# Patient Record
Sex: FEMALE | Race: WHITE | NOT HISPANIC OR LATINO | Employment: OTHER | URBAN - METROPOLITAN AREA
[De-identification: names, ages, dates, MRNs, and addresses within clinical notes are randomized per-mention and may not be internally consistent; named-entity substitution may affect disease eponyms.]

---

## 2017-05-01 ENCOUNTER — RECORDS - HEALTHEAST (OUTPATIENT)
Dept: LAB | Facility: CLINIC | Age: 52
End: 2017-05-01

## 2017-05-02 LAB
CHOLEST SERPL-MCNC: 179 MG/DL
FASTING STATUS PATIENT QL REPORTED: NORMAL
HDLC SERPL-MCNC: 58 MG/DL
LDLC SERPL CALC-MCNC: 106 MG/DL
TRIGL SERPL-MCNC: 73 MG/DL

## 2018-02-23 ENCOUNTER — RECORDS - HEALTHEAST (OUTPATIENT)
Dept: LAB | Facility: CLINIC | Age: 53
End: 2018-02-23

## 2018-02-23 LAB
CHOLEST SERPL-MCNC: 172 MG/DL
FASTING STATUS PATIENT QL REPORTED: NO
HDLC SERPL-MCNC: 55 MG/DL
LDLC SERPL CALC-MCNC: 78 MG/DL
TRIGL SERPL-MCNC: 193 MG/DL

## 2018-07-06 ENCOUNTER — RECORDS - HEALTHEAST (OUTPATIENT)
Dept: LAB | Facility: CLINIC | Age: 53
End: 2018-07-06

## 2018-07-06 LAB
ANION GAP SERPL CALCULATED.3IONS-SCNC: 10 MMOL/L (ref 5–18)
BUN SERPL-MCNC: 16 MG/DL (ref 8–22)
CALCIUM SERPL-MCNC: 9.6 MG/DL (ref 8.5–10.5)
CHLORIDE BLD-SCNC: 106 MMOL/L (ref 98–107)
CO2 SERPL-SCNC: 23 MMOL/L (ref 22–31)
CREAT SERPL-MCNC: 0.7 MG/DL (ref 0.6–1.1)
ERYTHROCYTE [DISTWIDTH] IN BLOOD BY AUTOMATED COUNT: 12.2 % (ref 11–14.5)
GFR SERPL CREATININE-BSD FRML MDRD: >60 ML/MIN/1.73M2
GLUCOSE BLD-MCNC: 96 MG/DL (ref 70–125)
HCT VFR BLD AUTO: 37.8 % (ref 35–47)
HGB BLD-MCNC: 12.8 G/DL (ref 12–16)
INR PPP: 0.95 (ref 0.9–1.1)
MCH RBC QN AUTO: 30.1 PG (ref 27–34)
MCHC RBC AUTO-ENTMCNC: 33.9 G/DL (ref 32–36)
MCV RBC AUTO: 89 FL (ref 80–100)
PLATELET # BLD AUTO: 245 THOU/UL (ref 140–440)
PMV BLD AUTO: 9.8 FL (ref 8.5–12.5)
POTASSIUM BLD-SCNC: 4.4 MMOL/L (ref 3.5–5)
RBC # BLD AUTO: 4.25 MILL/UL (ref 3.8–5.4)
SODIUM SERPL-SCNC: 139 MMOL/L (ref 136–145)
WBC: 6.6 THOU/UL (ref 4–11)

## 2018-08-24 ENCOUNTER — RECORDS - HEALTHEAST (OUTPATIENT)
Dept: LAB | Facility: CLINIC | Age: 53
End: 2018-08-24

## 2018-08-24 LAB
CHOLEST SERPL-MCNC: 228 MG/DL
FASTING STATUS PATIENT QL REPORTED: ABNORMAL
HDLC SERPL-MCNC: 69 MG/DL
LDLC SERPL CALC-MCNC: 132 MG/DL
TRIGL SERPL-MCNC: 133 MG/DL

## 2018-08-27 LAB
HPV SOURCE: NORMAL
HUMAN PAPILLOMA VIRUS 16 DNA: NEGATIVE
HUMAN PAPILLOMA VIRUS 18 DNA: NEGATIVE
HUMAN PAPILLOMA VIRUS FINAL DIAGNOSIS: NORMAL
HUMAN PAPILLOMA VIRUS OTHER HR: NEGATIVE
SPECIMEN DESCRIPTION: NORMAL

## 2018-08-29 LAB
BKR LAB AP ABNORMAL BLEEDING: NO
BKR LAB AP BIRTH CONTROL/HORMONES: NORMAL
BKR LAB AP CERVICAL APPEARANCE: NORMAL
BKR LAB AP GYN ADEQUACY: NORMAL
BKR LAB AP GYN INTERPRETATION: NORMAL
BKR LAB AP HPV REFLEX: NORMAL
BKR LAB AP LMP: NORMAL
BKR LAB AP PATIENT STATUS: NORMAL
BKR LAB AP PREVIOUS ABNORMAL: NORMAL
BKR LAB AP PREVIOUS NORMAL: 2014
HIGH RISK?: NO
PATH REPORT.COMMENTS IMP SPEC: NORMAL
RESULT FLAG (HE HISTORICAL CONVERSION): NORMAL

## 2018-09-28 ENCOUNTER — HOSPITAL ENCOUNTER (OUTPATIENT)
Dept: MAMMOGRAPHY | Facility: CLINIC | Age: 53
Discharge: HOME | End: 2018-09-28
Attending: FAMILY MEDICINE

## 2018-09-28 DIAGNOSIS — Z12.31 VISIT FOR SCREENING MAMMOGRAM: ICD-10-CM

## 2019-08-09 ENCOUNTER — RECORDS - HEALTHEAST (OUTPATIENT)
Dept: LAB | Facility: CLINIC | Age: 54
End: 2019-08-09

## 2019-08-09 LAB
ANION GAP SERPL CALCULATED.3IONS-SCNC: 10 MMOL/L (ref 5–18)
BUN SERPL-MCNC: 17 MG/DL (ref 8–22)
CALCIUM SERPL-MCNC: 9.8 MG/DL (ref 8.5–10.5)
CHLORIDE BLD-SCNC: 103 MMOL/L (ref 98–107)
CO2 SERPL-SCNC: 27 MMOL/L (ref 22–31)
CREAT SERPL-MCNC: 0.83 MG/DL (ref 0.6–1.1)
ERYTHROCYTE [DISTWIDTH] IN BLOOD BY AUTOMATED COUNT: 11.9 % (ref 11–14.5)
GFR SERPL CREATININE-BSD FRML MDRD: >60 ML/MIN/1.73M2
GLUCOSE BLD-MCNC: 85 MG/DL (ref 70–125)
HCT VFR BLD AUTO: 38 % (ref 35–47)
HGB BLD-MCNC: 12.5 G/DL (ref 12–16)
MCH RBC QN AUTO: 29.8 PG (ref 27–34)
MCHC RBC AUTO-ENTMCNC: 32.9 G/DL (ref 32–36)
MCV RBC AUTO: 91 FL (ref 80–100)
PLATELET # BLD AUTO: 210 THOU/UL (ref 140–440)
PMV BLD AUTO: 9.4 FL (ref 8.5–12.5)
POTASSIUM BLD-SCNC: 3.9 MMOL/L (ref 3.5–5)
RBC # BLD AUTO: 4.19 MILL/UL (ref 3.8–5.4)
SODIUM SERPL-SCNC: 140 MMOL/L (ref 136–145)
WBC: 6.1 THOU/UL (ref 4–11)

## 2020-02-06 ENCOUNTER — HOSPITAL ENCOUNTER (OUTPATIENT)
Dept: MAMMOGRAPHY | Facility: CLINIC | Age: 55
Discharge: HOME | End: 2020-02-06
Attending: FAMILY MEDICINE

## 2020-02-06 ENCOUNTER — COMMUNICATION - HEALTHEAST (OUTPATIENT)
Dept: TELEHEALTH | Facility: CLINIC | Age: 55
End: 2020-02-06

## 2020-02-06 ENCOUNTER — RECORDS - HEALTHEAST (OUTPATIENT)
Dept: ADMINISTRATIVE | Facility: OTHER | Age: 55
End: 2020-02-06

## 2020-02-06 ENCOUNTER — HOSPITAL ENCOUNTER (OUTPATIENT)
Dept: ULTRASOUND IMAGING | Facility: CLINIC | Age: 55
Discharge: HOME | End: 2020-02-06
Attending: FAMILY MEDICINE

## 2020-02-06 DIAGNOSIS — N64.59 INVERSION, NIPPLE: ICD-10-CM

## 2020-02-06 DIAGNOSIS — N63.10 BREAST MASS, RIGHT: ICD-10-CM

## 2020-02-06 DIAGNOSIS — Z12.31 VISIT FOR SCREENING MAMMOGRAM: ICD-10-CM

## 2020-02-07 ENCOUNTER — RECORDS - HEALTHEAST (OUTPATIENT)
Dept: ADMINISTRATIVE | Facility: OTHER | Age: 55
End: 2020-02-07

## 2020-02-10 ENCOUNTER — HOSPITAL ENCOUNTER (OUTPATIENT)
Dept: MAMMOGRAPHY | Facility: CLINIC | Age: 55
Discharge: HOME | End: 2020-02-10
Attending: FAMILY MEDICINE

## 2020-02-10 ENCOUNTER — HOSPITAL ENCOUNTER (OUTPATIENT)
Dept: ULTRASOUND IMAGING | Facility: CLINIC | Age: 55
Discharge: HOME | End: 2020-02-10
Attending: FAMILY MEDICINE | Admitting: RADIOLOGY

## 2020-02-10 DIAGNOSIS — N63.10 BREAST MASS, RIGHT: ICD-10-CM

## 2020-02-10 DIAGNOSIS — N64.59 INVERSION, NIPPLE: ICD-10-CM

## 2020-02-13 ENCOUNTER — COMMUNICATION - HEALTHEAST (OUTPATIENT)
Dept: ONCOLOGY | Facility: CLINIC | Age: 55
End: 2020-02-13

## 2020-02-13 LAB
LAB AP CHARGES (HE HISTORICAL CONVERSION): NORMAL
PATH REPORT.COMMENTS IMP SPEC: NORMAL
PATH REPORT.COMMENTS IMP SPEC: NORMAL
PATH REPORT.FINAL DX SPEC: NORMAL
PATH REPORT.GROSS SPEC: NORMAL
PATH REPORT.MICROSCOPIC SPEC OTHER STN: NORMAL
PATH REPORT.MICROSCOPIC SPEC OTHER STN: NORMAL
PATH REPORT.RELEVANT HX SPEC: NORMAL
RESULT FLAG (HE HISTORICAL CONVERSION): NORMAL

## 2020-02-14 ENCOUNTER — RECORDS - HEALTHEAST (OUTPATIENT)
Dept: ADMINISTRATIVE | Facility: OTHER | Age: 55
End: 2020-02-14

## 2020-02-14 ENCOUNTER — AMBULATORY - HEALTHEAST (OUTPATIENT)
Dept: SURGERY | Facility: CLINIC | Age: 55
End: 2020-02-14

## 2020-02-14 DIAGNOSIS — C50.211 MALIGNANT NEOPLASM OF UPPER-INNER QUADRANT OF RIGHT FEMALE BREAST (H): ICD-10-CM

## 2020-02-14 DIAGNOSIS — C50.911 MALIGNANT NEOPLASM OF RIGHT BREAST (H): ICD-10-CM

## 2020-02-14 DIAGNOSIS — D05.11 DUCTAL CARCINOMA IN SITU OF RIGHT BREAST: ICD-10-CM

## 2020-02-17 ENCOUNTER — AMBULATORY - HEALTHEAST (OUTPATIENT)
Dept: SURGERY | Facility: CLINIC | Age: 55
End: 2020-02-17

## 2020-02-20 ENCOUNTER — HOSPITAL ENCOUNTER (OUTPATIENT)
Dept: SURGERY | Facility: CLINIC | Age: 55
Discharge: HOME | End: 2020-02-20
Attending: SPECIALIST

## 2020-02-20 DIAGNOSIS — C50.111 MALIGNANT NEOPLASM OF CENTRAL PORTION OF RIGHT BREAST IN FEMALE, ESTROGEN RECEPTOR POSITIVE (H): ICD-10-CM

## 2020-02-20 DIAGNOSIS — Z17.0 MALIGNANT NEOPLASM OF CENTRAL PORTION OF RIGHT BREAST IN FEMALE, ESTROGEN RECEPTOR POSITIVE (H): ICD-10-CM

## 2020-02-20 ASSESSMENT — MIFFLIN-ST. JEOR: SCORE: 1372.46

## 2020-03-05 ENCOUNTER — RECORDS - HEALTHEAST (OUTPATIENT)
Dept: LAB | Facility: CLINIC | Age: 55
End: 2020-03-05

## 2020-03-05 LAB
ANION GAP SERPL CALCULATED.3IONS-SCNC: 8 MMOL/L (ref 5–18)
BUN SERPL-MCNC: 17 MG/DL (ref 8–22)
CALCIUM SERPL-MCNC: 9.5 MG/DL (ref 8.5–10.5)
CHLORIDE BLD-SCNC: 105 MMOL/L (ref 98–107)
CO2 SERPL-SCNC: 27 MMOL/L (ref 22–31)
CREAT SERPL-MCNC: 0.82 MG/DL (ref 0.6–1.1)
GFR SERPL CREATININE-BSD FRML MDRD: >60 ML/MIN/1.73M2
GLUCOSE BLD-MCNC: 80 MG/DL (ref 70–125)
POTASSIUM BLD-SCNC: 3.9 MMOL/L (ref 3.5–5)
SODIUM SERPL-SCNC: 140 MMOL/L (ref 136–145)

## 2020-03-09 ENCOUNTER — COMMUNICATION - HEALTHEAST (OUTPATIENT)
Dept: SURGERY | Facility: CLINIC | Age: 55
End: 2020-03-09

## 2020-03-17 ENCOUNTER — ANESTHESIA - HEALTHEAST (OUTPATIENT)
Dept: SURGERY | Facility: HOSPITAL | Age: 55
End: 2020-03-17

## 2020-03-17 ASSESSMENT — MIFFLIN-ST. JEOR: SCORE: 1372.46

## 2020-03-18 ENCOUNTER — RECORDS - HEALTHEAST (OUTPATIENT)
Dept: ADMINISTRATIVE | Facility: OTHER | Age: 55
End: 2020-03-18

## 2020-03-18 ENCOUNTER — HOSPITAL ENCOUNTER (OUTPATIENT)
Dept: NUCLEAR MEDICINE | Facility: HOSPITAL | Age: 55
Discharge: HOME | End: 2020-03-18
Attending: SPECIALIST

## 2020-03-18 ENCOUNTER — SURGERY - HEALTHEAST (OUTPATIENT)
Dept: SURGERY | Facility: HOSPITAL | Age: 55
End: 2020-03-18

## 2020-03-18 ENCOUNTER — HOSPITAL ENCOUNTER (OUTPATIENT)
Dept: NUCLEAR MEDICINE | Facility: HOSPITAL | Age: 55
Discharge: HOME | End: 2020-03-18
Attending: SPECIALIST | Admitting: SPECIALIST

## 2020-03-18 DIAGNOSIS — C50.111 MALIGNANT NEOPLASM OF CENTRAL PORTION OF RIGHT BREAST IN FEMALE, ESTROGEN RECEPTOR POSITIVE (H): ICD-10-CM

## 2020-03-18 DIAGNOSIS — Z17.0 MALIGNANT NEOPLASM OF CENTRAL PORTION OF RIGHT BREAST IN FEMALE, ESTROGEN RECEPTOR POSITIVE (H): ICD-10-CM

## 2020-03-24 ENCOUNTER — AMBULATORY - HEALTHEAST (OUTPATIENT)
Dept: SURGERY | Facility: CLINIC | Age: 55
End: 2020-03-24

## 2020-03-24 DIAGNOSIS — C50.111 MALIGNANT NEOPLASM OF CENTRAL PORTION OF RIGHT BREAST IN FEMALE, ESTROGEN RECEPTOR POSITIVE (H): ICD-10-CM

## 2020-03-24 DIAGNOSIS — Z17.0 MALIGNANT NEOPLASM OF CENTRAL PORTION OF RIGHT BREAST IN FEMALE, ESTROGEN RECEPTOR POSITIVE (H): ICD-10-CM

## 2020-03-26 ASSESSMENT — MIFFLIN-ST. JEOR: SCORE: 1358.85

## 2020-03-29 ENCOUNTER — ANESTHESIA - HEALTHEAST (OUTPATIENT)
Dept: SURGERY | Facility: HOSPITAL | Age: 55
End: 2020-03-29

## 2020-03-30 ENCOUNTER — SURGERY - HEALTHEAST (OUTPATIENT)
Dept: SURGERY | Facility: HOSPITAL | Age: 55
End: 2020-03-30

## 2020-04-03 ENCOUNTER — AMBULATORY - HEALTHEAST (OUTPATIENT)
Dept: SURGERY | Facility: CLINIC | Age: 55
End: 2020-04-03

## 2020-04-03 DIAGNOSIS — Z17.0 MALIGNANT NEOPLASM OF CENTRAL PORTION OF RIGHT BREAST IN FEMALE, ESTROGEN RECEPTOR POSITIVE (H): ICD-10-CM

## 2020-04-03 DIAGNOSIS — C50.111 MALIGNANT NEOPLASM OF CENTRAL PORTION OF RIGHT BREAST IN FEMALE, ESTROGEN RECEPTOR POSITIVE (H): ICD-10-CM

## 2020-04-06 ENCOUNTER — COMMUNICATION - HEALTHEAST (OUTPATIENT)
Dept: ONCOLOGY | Facility: CLINIC | Age: 55
End: 2020-04-06

## 2020-04-07 ENCOUNTER — RECORDS - HEALTHEAST (OUTPATIENT)
Dept: ADMINISTRATIVE | Facility: OTHER | Age: 55
End: 2020-04-07

## 2020-04-07 ENCOUNTER — AMBULATORY - HEALTHEAST (OUTPATIENT)
Dept: ONCOLOGY | Facility: CLINIC | Age: 55
End: 2020-04-07

## 2020-04-07 ENCOUNTER — COMMUNICATION - HEALTHEAST (OUTPATIENT)
Dept: ONCOLOGY | Facility: CLINIC | Age: 55
End: 2020-04-07

## 2020-04-07 ENCOUNTER — OFFICE VISIT - HEALTHEAST (OUTPATIENT)
Dept: ONCOLOGY | Facility: CLINIC | Age: 55
End: 2020-04-07

## 2020-04-07 DIAGNOSIS — L03.313 CELLULITIS OF CHEST WALL: ICD-10-CM

## 2020-04-07 DIAGNOSIS — Z51.11 ENCOUNTER FOR ANTINEOPLASTIC CHEMOTHERAPY: ICD-10-CM

## 2020-04-07 DIAGNOSIS — C50.211 MALIGNANT NEOPLASM OF UPPER-INNER QUADRANT OF RIGHT BREAST IN FEMALE, ESTROGEN RECEPTOR POSITIVE (H): ICD-10-CM

## 2020-04-07 DIAGNOSIS — Z17.0 MALIGNANT NEOPLASM OF UPPER-INNER QUADRANT OF RIGHT BREAST IN FEMALE, ESTROGEN RECEPTOR POSITIVE (H): ICD-10-CM

## 2020-04-07 LAB
ALBUMIN SERPL-MCNC: 3.6 G/DL (ref 3.5–5)
ALP SERPL-CCNC: 74 U/L (ref 45–120)
ALT SERPL W P-5'-P-CCNC: 18 U/L (ref 0–45)
ANION GAP SERPL CALCULATED.3IONS-SCNC: 12 MMOL/L (ref 5–18)
AST SERPL W P-5'-P-CCNC: 20 U/L (ref 0–40)
BASOPHILS # BLD AUTO: 0 THOU/UL (ref 0–0.2)
BASOPHILS NFR BLD AUTO: 0 % (ref 0–2)
BILIRUB SERPL-MCNC: 0.5 MG/DL (ref 0–1)
BUN SERPL-MCNC: 13 MG/DL (ref 8–22)
CALCIUM SERPL-MCNC: 9.4 MG/DL (ref 8.5–10.5)
CHLORIDE BLD-SCNC: 103 MMOL/L (ref 98–107)
CO2 SERPL-SCNC: 21 MMOL/L (ref 22–31)
CREAT SERPL-MCNC: 0.9 MG/DL (ref 0.6–1.1)
EOSINOPHIL # BLD AUTO: 0.1 THOU/UL (ref 0–0.4)
EOSINOPHIL NFR BLD AUTO: 1 % (ref 0–6)
ERYTHROCYTE [DISTWIDTH] IN BLOOD BY AUTOMATED COUNT: 11.9 % (ref 11–14.5)
GFR SERPL CREATININE-BSD FRML MDRD: >60 ML/MIN/1.73M2
GLUCOSE BLD-MCNC: 96 MG/DL (ref 70–125)
HCT VFR BLD AUTO: 37.8 % (ref 35–47)
HGB BLD-MCNC: 12.3 G/DL (ref 12–16)
LYMPHOCYTES # BLD AUTO: 0.9 THOU/UL (ref 0.8–4.4)
LYMPHOCYTES NFR BLD AUTO: 12 % (ref 20–40)
MCH RBC QN AUTO: 29.9 PG (ref 27–34)
MCHC RBC AUTO-ENTMCNC: 32.5 G/DL (ref 32–36)
MCV RBC AUTO: 92 FL (ref 80–100)
MONOCYTES # BLD AUTO: 0.8 THOU/UL (ref 0–0.9)
MONOCYTES NFR BLD AUTO: 10 % (ref 2–10)
NEUTROPHILS # BLD AUTO: 5.9 THOU/UL (ref 2–7.7)
NEUTROPHILS NFR BLD AUTO: 76 % (ref 50–70)
PLATELET # BLD AUTO: 215 THOU/UL (ref 140–440)
PMV BLD AUTO: 9.4 FL (ref 8.5–12.5)
POTASSIUM BLD-SCNC: 3.7 MMOL/L (ref 3.5–5)
PROT SERPL-MCNC: 6.7 G/DL (ref 6–8)
RBC # BLD AUTO: 4.12 MILL/UL (ref 3.8–5.4)
SODIUM SERPL-SCNC: 136 MMOL/L (ref 136–145)
WBC: 7.7 THOU/UL (ref 4–11)

## 2020-04-07 ASSESSMENT — MIFFLIN-ST. JEOR: SCORE: 1399.67

## 2020-04-09 ENCOUNTER — COMMUNICATION - HEALTHEAST (OUTPATIENT)
Dept: ADMINISTRATIVE | Facility: HOSPITAL | Age: 55
End: 2020-04-09

## 2020-04-14 ENCOUNTER — HOSPITAL ENCOUNTER (OUTPATIENT)
Dept: NUCLEAR MEDICINE | Facility: CLINIC | Age: 55
Setting detail: RADIATION/ONCOLOGY SERIES
Discharge: STILL ADMITTED OR ADMINISTRATIVE | End: 2020-04-14
Attending: INTERNAL MEDICINE

## 2020-04-14 DIAGNOSIS — C50.211 MALIGNANT NEOPLASM OF UPPER-INNER QUADRANT OF RIGHT BREAST IN FEMALE, ESTROGEN RECEPTOR POSITIVE (H): ICD-10-CM

## 2020-04-14 DIAGNOSIS — Z17.0 MALIGNANT NEOPLASM OF UPPER-INNER QUADRANT OF RIGHT BREAST IN FEMALE, ESTROGEN RECEPTOR POSITIVE (H): ICD-10-CM

## 2020-04-14 DIAGNOSIS — Z51.11 ENCOUNTER FOR ANTINEOPLASTIC CHEMOTHERAPY: ICD-10-CM

## 2020-04-15 LAB — MUGA LV EJECTION FRACTION: 66 %

## 2020-04-28 ENCOUNTER — OFFICE VISIT - HEALTHEAST (OUTPATIENT)
Dept: ONCOLOGY | Facility: CLINIC | Age: 55
End: 2020-04-28

## 2020-04-28 ENCOUNTER — AMBULATORY - HEALTHEAST (OUTPATIENT)
Dept: ONCOLOGY | Facility: CLINIC | Age: 55
End: 2020-04-28

## 2020-04-28 DIAGNOSIS — Z17.0 MALIGNANT NEOPLASM OF UPPER-INNER QUADRANT OF RIGHT BREAST IN FEMALE, ESTROGEN RECEPTOR POSITIVE (H): ICD-10-CM

## 2020-04-28 DIAGNOSIS — C50.211 MALIGNANT NEOPLASM OF UPPER-INNER QUADRANT OF RIGHT BREAST IN FEMALE, ESTROGEN RECEPTOR POSITIVE (H): ICD-10-CM

## 2020-04-28 DIAGNOSIS — Z51.11 ENCOUNTER FOR ANTINEOPLASTIC CHEMOTHERAPY: ICD-10-CM

## 2020-04-29 ENCOUNTER — COMMUNICATION - HEALTHEAST (OUTPATIENT)
Dept: ONCOLOGY | Facility: CLINIC | Age: 55
End: 2020-04-29

## 2020-05-01 ENCOUNTER — COMMUNICATION - HEALTHEAST (OUTPATIENT)
Dept: ONCOLOGY | Facility: CLINIC | Age: 55
End: 2020-05-01

## 2020-05-07 ENCOUNTER — COMMUNICATION - HEALTHEAST (OUTPATIENT)
Dept: ONCOLOGY | Facility: CLINIC | Age: 55
End: 2020-05-07

## 2020-05-08 ENCOUNTER — INFUSION - HEALTHEAST (OUTPATIENT)
Dept: INFUSION THERAPY | Facility: CLINIC | Age: 55
End: 2020-05-08

## 2020-05-08 ENCOUNTER — AMBULATORY - HEALTHEAST (OUTPATIENT)
Dept: INFUSION THERAPY | Facility: CLINIC | Age: 55
End: 2020-05-08

## 2020-05-08 ENCOUNTER — OFFICE VISIT - HEALTHEAST (OUTPATIENT)
Dept: ONCOLOGY | Facility: CLINIC | Age: 55
End: 2020-05-08

## 2020-05-08 DIAGNOSIS — Z17.0 MALIGNANT NEOPLASM OF UPPER-INNER QUADRANT OF RIGHT BREAST IN FEMALE, ESTROGEN RECEPTOR POSITIVE (H): ICD-10-CM

## 2020-05-08 DIAGNOSIS — C50.211 MALIGNANT NEOPLASM OF UPPER-INNER QUADRANT OF RIGHT BREAST IN FEMALE, ESTROGEN RECEPTOR POSITIVE (H): ICD-10-CM

## 2020-05-08 DIAGNOSIS — Z51.11 ENCOUNTER FOR ANTINEOPLASTIC CHEMOTHERAPY: ICD-10-CM

## 2020-05-08 LAB
ALBUMIN SERPL-MCNC: 3.9 G/DL (ref 3.5–5)
ALP SERPL-CCNC: 68 U/L (ref 45–120)
ALT SERPL W P-5'-P-CCNC: 20 U/L (ref 0–45)
ANION GAP SERPL CALCULATED.3IONS-SCNC: 9 MMOL/L (ref 5–18)
AST SERPL W P-5'-P-CCNC: 18 U/L (ref 0–40)
BASOPHILS # BLD AUTO: 0 THOU/UL (ref 0–0.2)
BASOPHILS NFR BLD AUTO: 1 % (ref 0–2)
BILIRUB SERPL-MCNC: 0.4 MG/DL (ref 0–1)
BUN SERPL-MCNC: 14 MG/DL (ref 8–22)
CALCIUM SERPL-MCNC: 9.1 MG/DL (ref 8.5–10.5)
CHLORIDE BLD-SCNC: 107 MMOL/L (ref 98–107)
CO2 SERPL-SCNC: 24 MMOL/L (ref 22–31)
CREAT SERPL-MCNC: 0.79 MG/DL (ref 0.6–1.1)
EOSINOPHIL # BLD AUTO: 0.2 THOU/UL (ref 0–0.4)
EOSINOPHIL NFR BLD AUTO: 4 % (ref 0–6)
ERYTHROCYTE [DISTWIDTH] IN BLOOD BY AUTOMATED COUNT: 12.2 % (ref 11–14.5)
GFR SERPL CREATININE-BSD FRML MDRD: >60 ML/MIN/1.73M2
GLUCOSE BLD-MCNC: 98 MG/DL (ref 70–125)
HCT VFR BLD AUTO: 38.4 % (ref 35–47)
HGB BLD-MCNC: 12.4 G/DL (ref 12–16)
LYMPHOCYTES # BLD AUTO: 1.6 THOU/UL (ref 0.8–4.4)
LYMPHOCYTES NFR BLD AUTO: 36 % (ref 20–40)
MCH RBC QN AUTO: 29.5 PG (ref 27–34)
MCHC RBC AUTO-ENTMCNC: 32.3 G/DL (ref 32–36)
MCV RBC AUTO: 91 FL (ref 80–100)
MONOCYTES # BLD AUTO: 0.5 THOU/UL (ref 0–0.9)
MONOCYTES NFR BLD AUTO: 11 % (ref 2–10)
NEUTROPHILS # BLD AUTO: 2.1 THOU/UL (ref 2–7.7)
NEUTROPHILS NFR BLD AUTO: 48 % (ref 50–70)
PLATELET # BLD AUTO: 220 THOU/UL (ref 140–440)
PMV BLD AUTO: 9.2 FL (ref 8.5–12.5)
POTASSIUM BLD-SCNC: 3.8 MMOL/L (ref 3.5–5)
PROT SERPL-MCNC: 6.9 G/DL (ref 6–8)
RBC # BLD AUTO: 4.2 MILL/UL (ref 3.8–5.4)
SODIUM SERPL-SCNC: 140 MMOL/L (ref 136–145)
WBC: 4.3 THOU/UL (ref 4–11)

## 2020-05-13 ENCOUNTER — COMMUNICATION - HEALTHEAST (OUTPATIENT)
Dept: ONCOLOGY | Facility: CLINIC | Age: 55
End: 2020-05-13

## 2020-05-22 ENCOUNTER — INFUSION - HEALTHEAST (OUTPATIENT)
Dept: INFUSION THERAPY | Facility: CLINIC | Age: 55
End: 2020-05-22

## 2020-05-22 ENCOUNTER — OFFICE VISIT - HEALTHEAST (OUTPATIENT)
Dept: ONCOLOGY | Facility: CLINIC | Age: 55
End: 2020-05-22

## 2020-05-22 ENCOUNTER — AMBULATORY - HEALTHEAST (OUTPATIENT)
Dept: INFUSION THERAPY | Facility: CLINIC | Age: 55
End: 2020-05-22

## 2020-05-22 DIAGNOSIS — C50.211 MALIGNANT NEOPLASM OF UPPER-INNER QUADRANT OF RIGHT BREAST IN FEMALE, ESTROGEN RECEPTOR POSITIVE (H): ICD-10-CM

## 2020-05-22 DIAGNOSIS — Z17.0 MALIGNANT NEOPLASM OF UPPER-INNER QUADRANT OF RIGHT BREAST IN FEMALE, ESTROGEN RECEPTOR POSITIVE (H): ICD-10-CM

## 2020-05-22 DIAGNOSIS — Z51.11 ENCOUNTER FOR ANTINEOPLASTIC CHEMOTHERAPY: ICD-10-CM

## 2020-05-22 LAB
ALBUMIN SERPL-MCNC: 3.6 G/DL (ref 3.5–5)
ALP SERPL-CCNC: 59 U/L (ref 45–120)
ALT SERPL W P-5'-P-CCNC: 22 U/L (ref 0–45)
ANION GAP SERPL CALCULATED.3IONS-SCNC: 6 MMOL/L (ref 5–18)
AST SERPL W P-5'-P-CCNC: 17 U/L (ref 0–40)
BASOPHILS # BLD AUTO: 0 THOU/UL (ref 0–0.2)
BASOPHILS NFR BLD AUTO: 0 % (ref 0–2)
BILIRUB SERPL-MCNC: 0.1 MG/DL (ref 0–1)
BUN SERPL-MCNC: 11 MG/DL (ref 8–22)
CALCIUM SERPL-MCNC: 9 MG/DL (ref 8.5–10.5)
CHLORIDE BLD-SCNC: 109 MMOL/L (ref 98–107)
CO2 SERPL-SCNC: 26 MMOL/L (ref 22–31)
CREAT SERPL-MCNC: 0.74 MG/DL (ref 0.6–1.1)
EOSINOPHIL COUNT (ABSOLUTE): 0 THOU/UL (ref 0–0.4)
EOSINOPHIL NFR BLD AUTO: 0 % (ref 0–6)
ERYTHROCYTE [DISTWIDTH] IN BLOOD BY AUTOMATED COUNT: 12.1 % (ref 11–14.5)
GFR SERPL CREATININE-BSD FRML MDRD: >60 ML/MIN/1.73M2
GLUCOSE BLD-MCNC: 84 MG/DL (ref 70–125)
HCT VFR BLD AUTO: 33.7 % (ref 35–47)
HGB BLD-MCNC: 10.9 G/DL (ref 12–16)
LYMPHOCYTES # BLD AUTO: 1.2 THOU/UL (ref 0.8–4.4)
LYMPHOCYTES NFR BLD AUTO: 26 % (ref 20–40)
MCH RBC QN AUTO: 29.5 PG (ref 27–34)
MCHC RBC AUTO-ENTMCNC: 32.3 G/DL (ref 32–36)
MCV RBC AUTO: 91 FL (ref 80–100)
METAMYELOCYTES (ABSOLUTE): 0 THOU/UL
METAMYELOCYTES NFR BLD MANUAL: 1 %
MONOCYTES # BLD AUTO: 0.3 THOU/UL (ref 0–0.9)
MONOCYTES NFR BLD AUTO: 6 % (ref 2–10)
PLAT MORPH BLD: NORMAL
PLATELET # BLD AUTO: 252 THOU/UL (ref 140–440)
PMV BLD AUTO: 8.8 FL (ref 8.5–12.5)
POTASSIUM BLD-SCNC: 4 MMOL/L (ref 3.5–5)
PROT SERPL-MCNC: 6.2 G/DL (ref 6–8)
RBC # BLD AUTO: 3.7 MILL/UL (ref 3.8–5.4)
SODIUM SERPL-SCNC: 141 MMOL/L (ref 136–145)
TOTAL NEUTROPHILS-ABS(DIFF): 3.2 THOU/UL (ref 2–7.7)
TOTAL NEUTROPHILS-REL(DIFF): 67 % (ref 50–70)
WBC: 4.8 THOU/UL (ref 4–11)

## 2020-06-03 ENCOUNTER — HOSPITAL ENCOUNTER (OUTPATIENT)
Dept: NUCLEAR MEDICINE | Facility: CLINIC | Age: 55
Setting detail: RADIATION/ONCOLOGY SERIES
Discharge: STILL ADMITTED OR ADMINISTRATIVE | End: 2020-06-03
Attending: INTERNAL MEDICINE

## 2020-06-03 DIAGNOSIS — Z51.11 ENCOUNTER FOR ANTINEOPLASTIC CHEMOTHERAPY: ICD-10-CM

## 2020-06-03 DIAGNOSIS — Z17.0 MALIGNANT NEOPLASM OF UPPER-INNER QUADRANT OF RIGHT BREAST IN FEMALE, ESTROGEN RECEPTOR POSITIVE (H): ICD-10-CM

## 2020-06-03 DIAGNOSIS — C50.211 MALIGNANT NEOPLASM OF UPPER-INNER QUADRANT OF RIGHT BREAST IN FEMALE, ESTROGEN RECEPTOR POSITIVE (H): ICD-10-CM

## 2020-06-03 LAB
MUGA LV EJECTION FRACTION: 65 %
MUGA PRIOR EJECTION FRACTION: 66 %

## 2020-06-04 ENCOUNTER — AMBULATORY - HEALTHEAST (OUTPATIENT)
Dept: INFUSION THERAPY | Facility: CLINIC | Age: 55
End: 2020-06-04

## 2020-06-04 ENCOUNTER — INFUSION - HEALTHEAST (OUTPATIENT)
Dept: INFUSION THERAPY | Facility: CLINIC | Age: 55
End: 2020-06-04

## 2020-06-04 ENCOUNTER — OFFICE VISIT - HEALTHEAST (OUTPATIENT)
Dept: ONCOLOGY | Facility: CLINIC | Age: 55
End: 2020-06-04

## 2020-06-04 DIAGNOSIS — Z51.11 ENCOUNTER FOR ANTINEOPLASTIC CHEMOTHERAPY: ICD-10-CM

## 2020-06-04 DIAGNOSIS — C50.211 MALIGNANT NEOPLASM OF UPPER-INNER QUADRANT OF RIGHT BREAST IN FEMALE, ESTROGEN RECEPTOR POSITIVE (H): ICD-10-CM

## 2020-06-04 DIAGNOSIS — Z17.0 MALIGNANT NEOPLASM OF UPPER-INNER QUADRANT OF RIGHT BREAST IN FEMALE, ESTROGEN RECEPTOR POSITIVE (H): ICD-10-CM

## 2020-06-04 DIAGNOSIS — Z51.81 ENCOUNTER FOR MONITORING CARDIOTOXIC DRUG THERAPY: ICD-10-CM

## 2020-06-04 DIAGNOSIS — K12.1 STOMATITIS AND MUCOSITIS: ICD-10-CM

## 2020-06-04 DIAGNOSIS — K12.30 STOMATITIS AND MUCOSITIS: ICD-10-CM

## 2020-06-04 DIAGNOSIS — Z79.899 ENCOUNTER FOR MONITORING CARDIOTOXIC DRUG THERAPY: ICD-10-CM

## 2020-06-04 DIAGNOSIS — K64.9 HEMORRHOIDS, UNSPECIFIED HEMORRHOID TYPE: ICD-10-CM

## 2020-06-04 LAB
ALBUMIN SERPL-MCNC: 3.6 G/DL (ref 3.5–5)
ALP SERPL-CCNC: 72 U/L (ref 45–120)
ALT SERPL W P-5'-P-CCNC: 17 U/L (ref 0–45)
ANION GAP SERPL CALCULATED.3IONS-SCNC: 7 MMOL/L (ref 5–18)
AST SERPL W P-5'-P-CCNC: 13 U/L (ref 0–40)
BASOPHILS # BLD AUTO: 0.1 THOU/UL (ref 0–0.2)
BASOPHILS NFR BLD AUTO: 1 % (ref 0–2)
BILIRUB SERPL-MCNC: 0.2 MG/DL (ref 0–1)
BUN SERPL-MCNC: 12 MG/DL (ref 8–22)
CALCIUM SERPL-MCNC: 9.1 MG/DL (ref 8.5–10.5)
CHLORIDE BLD-SCNC: 106 MMOL/L (ref 98–107)
CO2 SERPL-SCNC: 27 MMOL/L (ref 22–31)
CREAT SERPL-MCNC: 0.79 MG/DL (ref 0.6–1.1)
EOSINOPHIL COUNT (ABSOLUTE): 0 THOU/UL (ref 0–0.4)
EOSINOPHIL NFR BLD AUTO: 0 % (ref 0–6)
ERYTHROCYTE [DISTWIDTH] IN BLOOD BY AUTOMATED COUNT: 13 % (ref 11–14.5)
GFR SERPL CREATININE-BSD FRML MDRD: >60 ML/MIN/1.73M2
GLUCOSE BLD-MCNC: 105 MG/DL (ref 70–125)
HCT VFR BLD AUTO: 32.2 % (ref 35–47)
HGB BLD-MCNC: 10.4 G/DL (ref 12–16)
LYMPHOCYTES # BLD AUTO: 1 THOU/UL (ref 0.8–4.4)
LYMPHOCYTES NFR BLD AUTO: 12 % (ref 20–40)
MANUAL NRBC PER 100 CELLS: 2
MCH RBC QN AUTO: 29.7 PG (ref 27–34)
MCHC RBC AUTO-ENTMCNC: 32.3 G/DL (ref 32–36)
MCV RBC AUTO: 92 FL (ref 80–100)
METAMYELOCYTES (ABSOLUTE): 0.2 THOU/UL
METAMYELOCYTES NFR BLD MANUAL: 2 %
MONOCYTES # BLD AUTO: 0.8 THOU/UL (ref 0–0.9)
MONOCYTES NFR BLD AUTO: 9 % (ref 2–10)
OVALOCYTES: ABNORMAL
PLAT MORPH BLD: NORMAL
PLATELET # BLD AUTO: 139 THOU/UL (ref 140–440)
PMV BLD AUTO: 8.7 FL (ref 8.5–12.5)
POTASSIUM BLD-SCNC: 3.9 MMOL/L (ref 3.5–5)
PROT SERPL-MCNC: 6.2 G/DL (ref 6–8)
RBC # BLD AUTO: 3.5 MILL/UL (ref 3.8–5.4)
SODIUM SERPL-SCNC: 140 MMOL/L (ref 136–145)
TOTAL NEUTROPHILS-ABS(DIFF): 6.5 THOU/UL (ref 2–7.7)
TOTAL NEUTROPHILS-REL(DIFF): 76 % (ref 50–70)
TOXIC GRANULATION: ABNORMAL
WBC: 8.6 THOU/UL (ref 4–11)

## 2020-06-18 ENCOUNTER — INFUSION - HEALTHEAST (OUTPATIENT)
Dept: INFUSION THERAPY | Facility: CLINIC | Age: 55
End: 2020-06-18

## 2020-06-18 ENCOUNTER — AMBULATORY - HEALTHEAST (OUTPATIENT)
Dept: INFUSION THERAPY | Facility: CLINIC | Age: 55
End: 2020-06-18

## 2020-06-18 ENCOUNTER — OFFICE VISIT - HEALTHEAST (OUTPATIENT)
Dept: ONCOLOGY | Facility: CLINIC | Age: 55
End: 2020-06-18

## 2020-06-18 DIAGNOSIS — Z51.11 ENCOUNTER FOR ANTINEOPLASTIC CHEMOTHERAPY: ICD-10-CM

## 2020-06-18 DIAGNOSIS — Z17.0 MALIGNANT NEOPLASM OF UPPER-INNER QUADRANT OF RIGHT BREAST IN FEMALE, ESTROGEN RECEPTOR POSITIVE (H): ICD-10-CM

## 2020-06-18 DIAGNOSIS — C50.211 MALIGNANT NEOPLASM OF UPPER-INNER QUADRANT OF RIGHT BREAST IN FEMALE, ESTROGEN RECEPTOR POSITIVE (H): ICD-10-CM

## 2020-06-18 DIAGNOSIS — T45.1X5A ANEMIA ASSOCIATED WITH CHEMOTHERAPY: ICD-10-CM

## 2020-06-18 DIAGNOSIS — D64.81 ANEMIA ASSOCIATED WITH CHEMOTHERAPY: ICD-10-CM

## 2020-06-18 DIAGNOSIS — S36.69XA RECTAL TEAR: ICD-10-CM

## 2020-06-18 LAB
ALBUMIN SERPL-MCNC: 3.5 G/DL (ref 3.5–5)
ALP SERPL-CCNC: 78 U/L (ref 45–120)
ALT SERPL W P-5'-P-CCNC: 17 U/L (ref 0–45)
ANION GAP SERPL CALCULATED.3IONS-SCNC: 8 MMOL/L (ref 5–18)
AST SERPL W P-5'-P-CCNC: 13 U/L (ref 0–40)
BASOPHILS # BLD AUTO: 0 THOU/UL (ref 0–0.2)
BASOPHILS NFR BLD AUTO: 0 % (ref 0–2)
BILIRUB SERPL-MCNC: 0.2 MG/DL (ref 0–1)
BUN SERPL-MCNC: 11 MG/DL (ref 8–22)
CALCIUM SERPL-MCNC: 8.7 MG/DL (ref 8.5–10.5)
CHLORIDE BLD-SCNC: 106 MMOL/L (ref 98–107)
CO2 SERPL-SCNC: 25 MMOL/L (ref 22–31)
CREAT SERPL-MCNC: 0.75 MG/DL (ref 0.6–1.1)
EOSINOPHIL COUNT (ABSOLUTE): 0 THOU/UL (ref 0–0.4)
EOSINOPHIL NFR BLD AUTO: 0 % (ref 0–6)
ERYTHROCYTE [DISTWIDTH] IN BLOOD BY AUTOMATED COUNT: 14.7 % (ref 11–14.5)
GFR SERPL CREATININE-BSD FRML MDRD: >60 ML/MIN/1.73M2
GLUCOSE BLD-MCNC: 80 MG/DL (ref 70–125)
HCT VFR BLD AUTO: 29.4 % (ref 35–47)
HGB BLD-MCNC: 9.6 G/DL (ref 12–16)
LYMPHOCYTES # BLD AUTO: 0.8 THOU/UL (ref 0.8–4.4)
LYMPHOCYTES NFR BLD AUTO: 9 % (ref 20–40)
MANUAL NRBC PER 100 CELLS: 3
MCH RBC QN AUTO: 30.4 PG (ref 27–34)
MCHC RBC AUTO-ENTMCNC: 32.7 G/DL (ref 32–36)
MCV RBC AUTO: 93 FL (ref 80–100)
METAMYELOCYTES (ABSOLUTE): 0.1 THOU/UL
METAMYELOCYTES NFR BLD MANUAL: 1 %
MONOCYTES # BLD AUTO: 0.8 THOU/UL (ref 0–0.9)
MONOCYTES NFR BLD AUTO: 9 % (ref 2–10)
MYELOCYTES (ABSOLUTE): 0.1 THOU/UL
MYELOCYTES NFR BLD MANUAL: 1 %
OVALOCYTES: ABNORMAL
PLAT MORPH BLD: NORMAL
PLATELET # BLD AUTO: 215 THOU/UL (ref 140–440)
PMV BLD AUTO: 9.3 FL (ref 8.5–12.5)
POLYCHROMASIA BLD QL SMEAR: ABNORMAL
POTASSIUM BLD-SCNC: 3.8 MMOL/L (ref 3.5–5)
PROT SERPL-MCNC: 6.2 G/DL (ref 6–8)
RBC # BLD AUTO: 3.16 MILL/UL (ref 3.8–5.4)
SODIUM SERPL-SCNC: 139 MMOL/L (ref 136–145)
TOTAL NEUTROPHILS-ABS(DIFF): 7.9 THOU/UL (ref 2–7.7)
TOTAL NEUTROPHILS-REL(DIFF): 81 % (ref 50–70)
WBC: 9.7 THOU/UL (ref 4–11)

## 2020-06-18 ASSESSMENT — MIFFLIN-ST. JEOR: SCORE: 1390.15

## 2020-06-23 ENCOUNTER — COMMUNICATION - HEALTHEAST (OUTPATIENT)
Dept: ONCOLOGY | Facility: CLINIC | Age: 55
End: 2020-06-23

## 2020-06-25 ENCOUNTER — HOSPITAL ENCOUNTER (OUTPATIENT)
Dept: NUCLEAR MEDICINE | Facility: CLINIC | Age: 55
Setting detail: RADIATION/ONCOLOGY SERIES
Discharge: STILL ADMITTED OR ADMINISTRATIVE | End: 2020-06-25
Attending: SPECIALIST

## 2020-06-25 DIAGNOSIS — Z79.899 ENCOUNTER FOR MONITORING CARDIOTOXIC DRUG THERAPY: ICD-10-CM

## 2020-06-25 DIAGNOSIS — Z51.11 ENCOUNTER FOR ANTINEOPLASTIC CHEMOTHERAPY: ICD-10-CM

## 2020-06-25 DIAGNOSIS — Z51.81 ENCOUNTER FOR MONITORING CARDIOTOXIC DRUG THERAPY: ICD-10-CM

## 2020-06-25 LAB
MUGA LV EJECTION FRACTION: 73 %
MUGA PRIOR EJECTION FRACTION: 65 %

## 2020-06-29 ENCOUNTER — AMBULATORY - HEALTHEAST (OUTPATIENT)
Dept: ONCOLOGY | Facility: CLINIC | Age: 55
End: 2020-06-29

## 2020-06-29 DIAGNOSIS — Z17.0 MALIGNANT NEOPLASM OF UPPER-INNER QUADRANT OF RIGHT BREAST IN FEMALE, ESTROGEN RECEPTOR POSITIVE (H): ICD-10-CM

## 2020-06-29 DIAGNOSIS — Z51.11 ENCOUNTER FOR ANTINEOPLASTIC CHEMOTHERAPY: ICD-10-CM

## 2020-06-29 DIAGNOSIS — C50.211 MALIGNANT NEOPLASM OF UPPER-INNER QUADRANT OF RIGHT BREAST IN FEMALE, ESTROGEN RECEPTOR POSITIVE (H): ICD-10-CM

## 2020-07-02 ENCOUNTER — COMMUNICATION - HEALTHEAST (OUTPATIENT)
Dept: ONCOLOGY | Facility: CLINIC | Age: 55
End: 2020-07-02

## 2020-07-03 ENCOUNTER — AMBULATORY - HEALTHEAST (OUTPATIENT)
Dept: INFUSION THERAPY | Facility: CLINIC | Age: 55
End: 2020-07-03

## 2020-07-03 ENCOUNTER — INFUSION - HEALTHEAST (OUTPATIENT)
Dept: INFUSION THERAPY | Facility: CLINIC | Age: 55
End: 2020-07-03

## 2020-07-03 ENCOUNTER — COMMUNICATION - HEALTHEAST (OUTPATIENT)
Dept: ONCOLOGY | Facility: CLINIC | Age: 55
End: 2020-07-03

## 2020-07-03 ENCOUNTER — OFFICE VISIT - HEALTHEAST (OUTPATIENT)
Dept: ONCOLOGY | Facility: CLINIC | Age: 55
End: 2020-07-03

## 2020-07-03 DIAGNOSIS — Z51.11 ENCOUNTER FOR ANTINEOPLASTIC CHEMOTHERAPY: ICD-10-CM

## 2020-07-03 DIAGNOSIS — Z17.0 MALIGNANT NEOPLASM OF UPPER-INNER QUADRANT OF RIGHT BREAST IN FEMALE, ESTROGEN RECEPTOR POSITIVE (H): ICD-10-CM

## 2020-07-03 DIAGNOSIS — T45.1X5A ANEMIA ASSOCIATED WITH CHEMOTHERAPY: ICD-10-CM

## 2020-07-03 DIAGNOSIS — C50.211 MALIGNANT NEOPLASM OF UPPER-INNER QUADRANT OF RIGHT BREAST IN FEMALE, ESTROGEN RECEPTOR POSITIVE (H): ICD-10-CM

## 2020-07-03 DIAGNOSIS — D64.81 ANEMIA ASSOCIATED WITH CHEMOTHERAPY: ICD-10-CM

## 2020-07-03 LAB
ALBUMIN SERPL-MCNC: 3.8 G/DL (ref 3.5–5)
ALP SERPL-CCNC: 85 U/L (ref 45–120)
ALT SERPL W P-5'-P-CCNC: 17 U/L (ref 0–45)
ANION GAP SERPL CALCULATED.3IONS-SCNC: 7 MMOL/L (ref 5–18)
AST SERPL W P-5'-P-CCNC: 14 U/L (ref 0–40)
BASOPHILS # BLD AUTO: 0 THOU/UL (ref 0–0.2)
BASOPHILS NFR BLD AUTO: 0 % (ref 0–2)
BILIRUB SERPL-MCNC: 0.2 MG/DL (ref 0–1)
BUN SERPL-MCNC: 11 MG/DL (ref 8–22)
CALCIUM SERPL-MCNC: 9.3 MG/DL (ref 8.5–10.5)
CHLORIDE BLD-SCNC: 106 MMOL/L (ref 98–107)
CO2 SERPL-SCNC: 25 MMOL/L (ref 22–31)
CREAT SERPL-MCNC: 0.8 MG/DL (ref 0.6–1.1)
EOSINOPHIL COUNT (ABSOLUTE): 0.2 THOU/UL (ref 0–0.4)
EOSINOPHIL NFR BLD AUTO: 2 % (ref 0–6)
ERYTHROCYTE [DISTWIDTH] IN BLOOD BY AUTOMATED COUNT: 17.6 % (ref 11–14.5)
GFR SERPL CREATININE-BSD FRML MDRD: >60 ML/MIN/1.73M2
GLUCOSE BLD-MCNC: 92 MG/DL (ref 70–125)
HCT VFR BLD AUTO: 30.8 % (ref 35–47)
HGB BLD-MCNC: 9.9 G/DL (ref 12–16)
LYMPHOCYTES # BLD AUTO: 0.5 THOU/UL (ref 0.8–4.4)
LYMPHOCYTES NFR BLD AUTO: 5 % (ref 20–40)
MCH RBC QN AUTO: 30.6 PG (ref 27–34)
MCHC RBC AUTO-ENTMCNC: 32.1 G/DL (ref 32–36)
MCV RBC AUTO: 95 FL (ref 80–100)
METAMYELOCYTES (ABSOLUTE): 0.4 THOU/UL
METAMYELOCYTES NFR BLD MANUAL: 4 %
MONOCYTES # BLD AUTO: 0.3 THOU/UL (ref 0–0.9)
MONOCYTES NFR BLD AUTO: 3 % (ref 2–10)
OVALOCYTES: ABNORMAL
PLAT MORPH BLD: NORMAL
PLATELET # BLD AUTO: 187 THOU/UL (ref 140–440)
PMV BLD AUTO: 9.2 FL (ref 8.5–12.5)
POTASSIUM BLD-SCNC: 3.7 MMOL/L (ref 3.5–5)
PROT SERPL-MCNC: 6.5 G/DL (ref 6–8)
RBC # BLD AUTO: 3.24 MILL/UL (ref 3.8–5.4)
SODIUM SERPL-SCNC: 138 MMOL/L (ref 136–145)
TOTAL NEUTROPHILS-ABS(DIFF): 8.9 THOU/UL (ref 2–7.7)
TOTAL NEUTROPHILS-REL(DIFF): 86 % (ref 50–70)
WBC: 10.3 THOU/UL (ref 4–11)

## 2020-07-09 ENCOUNTER — INFUSION - HEALTHEAST (OUTPATIENT)
Dept: INFUSION THERAPY | Facility: CLINIC | Age: 55
End: 2020-07-09

## 2020-07-09 DIAGNOSIS — C50.211 MALIGNANT NEOPLASM OF UPPER-INNER QUADRANT OF RIGHT BREAST IN FEMALE, ESTROGEN RECEPTOR POSITIVE (H): ICD-10-CM

## 2020-07-09 DIAGNOSIS — Z17.0 MALIGNANT NEOPLASM OF UPPER-INNER QUADRANT OF RIGHT BREAST IN FEMALE, ESTROGEN RECEPTOR POSITIVE (H): ICD-10-CM

## 2020-07-09 DIAGNOSIS — Z51.11 ENCOUNTER FOR ANTINEOPLASTIC CHEMOTHERAPY: ICD-10-CM

## 2020-07-09 LAB
BASOPHILS # BLD AUTO: 0 THOU/UL (ref 0–0.2)
BASOPHILS NFR BLD AUTO: 1 % (ref 0–2)
EOSINOPHIL # BLD AUTO: 0 THOU/UL (ref 0–0.4)
EOSINOPHIL NFR BLD AUTO: 1 % (ref 0–6)
ERYTHROCYTE [DISTWIDTH] IN BLOOD BY AUTOMATED COUNT: 17.6 % (ref 11–14.5)
HCT VFR BLD AUTO: 28.1 % (ref 35–47)
HGB BLD-MCNC: 9 G/DL (ref 12–16)
LYMPHOCYTES # BLD AUTO: 0.5 THOU/UL (ref 0.8–4.4)
LYMPHOCYTES NFR BLD AUTO: 15 % (ref 20–40)
MCH RBC QN AUTO: 30.5 PG (ref 27–34)
MCHC RBC AUTO-ENTMCNC: 32 G/DL (ref 32–36)
MCV RBC AUTO: 95 FL (ref 80–100)
MONOCYTES # BLD AUTO: 0.4 THOU/UL (ref 0–0.9)
MONOCYTES NFR BLD AUTO: 12 % (ref 2–10)
NEUTROPHILS # BLD AUTO: 2.5 THOU/UL (ref 2–7.7)
NEUTROPHILS NFR BLD AUTO: 70 % (ref 50–70)
PLATELET # BLD AUTO: 251 THOU/UL (ref 140–440)
PMV BLD AUTO: 8.9 FL (ref 8.5–12.5)
RBC # BLD AUTO: 2.95 MILL/UL (ref 3.8–5.4)
WBC: 3.6 THOU/UL (ref 4–11)

## 2020-07-16 ENCOUNTER — AMBULATORY - HEALTHEAST (OUTPATIENT)
Dept: RADIATION ONCOLOGY | Facility: HOSPITAL | Age: 55
End: 2020-07-16

## 2020-07-16 ENCOUNTER — OFFICE VISIT - HEALTHEAST (OUTPATIENT)
Dept: RADIATION ONCOLOGY | Facility: HOSPITAL | Age: 55
End: 2020-07-16

## 2020-07-16 DIAGNOSIS — C50.211 MALIGNANT NEOPLASM OF UPPER-INNER QUADRANT OF RIGHT BREAST IN FEMALE, ESTROGEN RECEPTOR POSITIVE (H): ICD-10-CM

## 2020-07-16 DIAGNOSIS — Z17.0 MALIGNANT NEOPLASM OF UPPER-INNER QUADRANT OF RIGHT BREAST IN FEMALE, ESTROGEN RECEPTOR POSITIVE (H): ICD-10-CM

## 2020-07-17 ENCOUNTER — INFUSION - HEALTHEAST (OUTPATIENT)
Dept: INFUSION THERAPY | Facility: CLINIC | Age: 55
End: 2020-07-17

## 2020-07-17 DIAGNOSIS — Z17.0 MALIGNANT NEOPLASM OF UPPER-INNER QUADRANT OF RIGHT BREAST IN FEMALE, ESTROGEN RECEPTOR POSITIVE (H): ICD-10-CM

## 2020-07-17 DIAGNOSIS — Z51.11 ENCOUNTER FOR ANTINEOPLASTIC CHEMOTHERAPY: ICD-10-CM

## 2020-07-17 DIAGNOSIS — C50.211 MALIGNANT NEOPLASM OF UPPER-INNER QUADRANT OF RIGHT BREAST IN FEMALE, ESTROGEN RECEPTOR POSITIVE (H): ICD-10-CM

## 2020-07-17 LAB
BASOPHILS # BLD AUTO: 0 THOU/UL (ref 0–0.2)
BASOPHILS NFR BLD AUTO: 1 % (ref 0–2)
EOSINOPHIL # BLD AUTO: 0 THOU/UL (ref 0–0.4)
EOSINOPHIL NFR BLD AUTO: 1 % (ref 0–6)
ERYTHROCYTE [DISTWIDTH] IN BLOOD BY AUTOMATED COUNT: 18.6 % (ref 11–14.5)
HCT VFR BLD AUTO: 29.7 % (ref 35–47)
HGB BLD-MCNC: 9.6 G/DL (ref 12–16)
LYMPHOCYTES # BLD AUTO: 0.6 THOU/UL (ref 0.8–4.4)
LYMPHOCYTES NFR BLD AUTO: 14 % (ref 20–40)
MCH RBC QN AUTO: 31.5 PG (ref 27–34)
MCHC RBC AUTO-ENTMCNC: 32.3 G/DL (ref 32–36)
MCV RBC AUTO: 97 FL (ref 80–100)
MONOCYTES # BLD AUTO: 0.6 THOU/UL (ref 0–0.9)
MONOCYTES NFR BLD AUTO: 15 % (ref 2–10)
NEUTROPHILS # BLD AUTO: 3 THOU/UL (ref 2–7.7)
NEUTROPHILS NFR BLD AUTO: 70 % (ref 50–70)
PLATELET # BLD AUTO: 219 THOU/UL (ref 140–440)
PMV BLD AUTO: 9.1 FL (ref 8.5–12.5)
RBC # BLD AUTO: 3.05 MILL/UL (ref 3.8–5.4)
WBC: 4.2 THOU/UL (ref 4–11)

## 2020-07-24 ENCOUNTER — OFFICE VISIT - HEALTHEAST (OUTPATIENT)
Dept: ONCOLOGY | Facility: CLINIC | Age: 55
End: 2020-07-24

## 2020-07-24 ENCOUNTER — INFUSION - HEALTHEAST (OUTPATIENT)
Dept: INFUSION THERAPY | Facility: CLINIC | Age: 55
End: 2020-07-24

## 2020-07-24 ENCOUNTER — AMBULATORY - HEALTHEAST (OUTPATIENT)
Dept: INFUSION THERAPY | Facility: CLINIC | Age: 55
End: 2020-07-24

## 2020-07-24 DIAGNOSIS — Z17.0 MALIGNANT NEOPLASM OF UPPER-INNER QUADRANT OF RIGHT BREAST IN FEMALE, ESTROGEN RECEPTOR POSITIVE (H): ICD-10-CM

## 2020-07-24 DIAGNOSIS — C50.211 MALIGNANT NEOPLASM OF UPPER-INNER QUADRANT OF RIGHT BREAST IN FEMALE, ESTROGEN RECEPTOR POSITIVE (H): ICD-10-CM

## 2020-07-24 DIAGNOSIS — Z51.11 ENCOUNTER FOR ANTINEOPLASTIC CHEMOTHERAPY: ICD-10-CM

## 2020-07-24 LAB
ALBUMIN SERPL-MCNC: 3.6 G/DL (ref 3.5–5)
ALP SERPL-CCNC: 56 U/L (ref 45–120)
ALT SERPL W P-5'-P-CCNC: 34 U/L (ref 0–45)
ANION GAP SERPL CALCULATED.3IONS-SCNC: 8 MMOL/L (ref 5–18)
AST SERPL W P-5'-P-CCNC: 20 U/L (ref 0–40)
BASOPHILS # BLD AUTO: 0 THOU/UL (ref 0–0.2)
BASOPHILS NFR BLD AUTO: 1 % (ref 0–2)
BILIRUB SERPL-MCNC: 0.3 MG/DL (ref 0–1)
BUN SERPL-MCNC: 16 MG/DL (ref 8–22)
CALCIUM SERPL-MCNC: 9.1 MG/DL (ref 8.5–10.5)
CHLORIDE BLD-SCNC: 108 MMOL/L (ref 98–107)
CO2 SERPL-SCNC: 24 MMOL/L (ref 22–31)
CREAT SERPL-MCNC: 0.74 MG/DL (ref 0.6–1.1)
EOSINOPHIL # BLD AUTO: 0.2 THOU/UL (ref 0–0.4)
EOSINOPHIL NFR BLD AUTO: 4 % (ref 0–6)
ERYTHROCYTE [DISTWIDTH] IN BLOOD BY AUTOMATED COUNT: 17.4 % (ref 11–14.5)
GFR SERPL CREATININE-BSD FRML MDRD: >60 ML/MIN/1.73M2
GLUCOSE BLD-MCNC: 98 MG/DL (ref 70–125)
HCT VFR BLD AUTO: 30.2 % (ref 35–47)
HGB BLD-MCNC: 9.9 G/DL (ref 12–16)
LYMPHOCYTES # BLD AUTO: 0.6 THOU/UL (ref 0.8–4.4)
LYMPHOCYTES NFR BLD AUTO: 13 % (ref 20–40)
MCH RBC QN AUTO: 31.8 PG (ref 27–34)
MCHC RBC AUTO-ENTMCNC: 32.8 G/DL (ref 32–36)
MCV RBC AUTO: 97 FL (ref 80–100)
MONOCYTES # BLD AUTO: 0.5 THOU/UL (ref 0–0.9)
MONOCYTES NFR BLD AUTO: 11 % (ref 2–10)
NEUTROPHILS # BLD AUTO: 3.3 THOU/UL (ref 2–7.7)
NEUTROPHILS NFR BLD AUTO: 70 % (ref 50–70)
PLATELET # BLD AUTO: 255 THOU/UL (ref 140–440)
PMV BLD AUTO: 8.7 FL (ref 8.5–12.5)
POTASSIUM BLD-SCNC: 4 MMOL/L (ref 3.5–5)
PROT SERPL-MCNC: 6.4 G/DL (ref 6–8)
RBC # BLD AUTO: 3.11 MILL/UL (ref 3.8–5.4)
SODIUM SERPL-SCNC: 140 MMOL/L (ref 136–145)
WBC: 4.8 THOU/UL (ref 4–11)

## 2020-07-27 ENCOUNTER — COMMUNICATION - HEALTHEAST (OUTPATIENT)
Dept: ONCOLOGY | Facility: CLINIC | Age: 55
End: 2020-07-27

## 2020-07-31 ENCOUNTER — INFUSION - HEALTHEAST (OUTPATIENT)
Dept: INFUSION THERAPY | Facility: CLINIC | Age: 55
End: 2020-07-31

## 2020-07-31 DIAGNOSIS — Z51.11 ENCOUNTER FOR ANTINEOPLASTIC CHEMOTHERAPY: ICD-10-CM

## 2020-07-31 DIAGNOSIS — Z17.0 MALIGNANT NEOPLASM OF UPPER-INNER QUADRANT OF RIGHT BREAST IN FEMALE, ESTROGEN RECEPTOR POSITIVE (H): ICD-10-CM

## 2020-07-31 DIAGNOSIS — C50.211 MALIGNANT NEOPLASM OF UPPER-INNER QUADRANT OF RIGHT BREAST IN FEMALE, ESTROGEN RECEPTOR POSITIVE (H): ICD-10-CM

## 2020-07-31 LAB
BASOPHILS # BLD AUTO: 0 THOU/UL (ref 0–0.2)
BASOPHILS NFR BLD AUTO: 1 % (ref 0–2)
EOSINOPHIL # BLD AUTO: 0.2 THOU/UL (ref 0–0.4)
EOSINOPHIL NFR BLD AUTO: 4 % (ref 0–6)
ERYTHROCYTE [DISTWIDTH] IN BLOOD BY AUTOMATED COUNT: 16.6 % (ref 11–14.5)
HCT VFR BLD AUTO: 30 % (ref 35–47)
HGB BLD-MCNC: 10 G/DL (ref 12–16)
LYMPHOCYTES # BLD AUTO: 0.7 THOU/UL (ref 0.8–4.4)
LYMPHOCYTES NFR BLD AUTO: 15 % (ref 20–40)
MCH RBC QN AUTO: 32.4 PG (ref 27–34)
MCHC RBC AUTO-ENTMCNC: 33.3 G/DL (ref 32–36)
MCV RBC AUTO: 97 FL (ref 80–100)
MONOCYTES # BLD AUTO: 0.5 THOU/UL (ref 0–0.9)
MONOCYTES NFR BLD AUTO: 10 % (ref 2–10)
NEUTROPHILS # BLD AUTO: 3.5 THOU/UL (ref 2–7.7)
NEUTROPHILS NFR BLD AUTO: 70 % (ref 50–70)
PLATELET # BLD AUTO: 223 THOU/UL (ref 140–440)
PMV BLD AUTO: 8.8 FL (ref 8.5–12.5)
RBC # BLD AUTO: 3.09 MILL/UL (ref 3.8–5.4)
WBC: 5 THOU/UL (ref 4–11)

## 2020-08-07 ENCOUNTER — INFUSION - HEALTHEAST (OUTPATIENT)
Dept: INFUSION THERAPY | Facility: CLINIC | Age: 55
End: 2020-08-07

## 2020-08-07 DIAGNOSIS — Z17.0 MALIGNANT NEOPLASM OF UPPER-INNER QUADRANT OF RIGHT BREAST IN FEMALE, ESTROGEN RECEPTOR POSITIVE (H): ICD-10-CM

## 2020-08-07 DIAGNOSIS — Z51.11 ENCOUNTER FOR ANTINEOPLASTIC CHEMOTHERAPY: ICD-10-CM

## 2020-08-07 DIAGNOSIS — C50.211 MALIGNANT NEOPLASM OF UPPER-INNER QUADRANT OF RIGHT BREAST IN FEMALE, ESTROGEN RECEPTOR POSITIVE (H): ICD-10-CM

## 2020-08-07 LAB
BASOPHILS # BLD AUTO: 0 THOU/UL (ref 0–0.2)
BASOPHILS NFR BLD AUTO: 1 % (ref 0–2)
EOSINOPHIL # BLD AUTO: 0.2 THOU/UL (ref 0–0.4)
EOSINOPHIL NFR BLD AUTO: 4 % (ref 0–6)
ERYTHROCYTE [DISTWIDTH] IN BLOOD BY AUTOMATED COUNT: 15.6 % (ref 11–14.5)
HCT VFR BLD AUTO: 30.3 % (ref 35–47)
HGB BLD-MCNC: 9.8 G/DL (ref 12–16)
LYMPHOCYTES # BLD AUTO: 0.6 THOU/UL (ref 0.8–4.4)
LYMPHOCYTES NFR BLD AUTO: 15 % (ref 20–40)
MCH RBC QN AUTO: 31.8 PG (ref 27–34)
MCHC RBC AUTO-ENTMCNC: 32.3 G/DL (ref 32–36)
MCV RBC AUTO: 98 FL (ref 80–100)
MONOCYTES # BLD AUTO: 0.4 THOU/UL (ref 0–0.9)
MONOCYTES NFR BLD AUTO: 10 % (ref 2–10)
NEUTROPHILS # BLD AUTO: 2.8 THOU/UL (ref 2–7.7)
NEUTROPHILS NFR BLD AUTO: 70 % (ref 50–70)
PLATELET # BLD AUTO: 242 THOU/UL (ref 140–440)
PMV BLD AUTO: 9 FL (ref 8.5–12.5)
RBC # BLD AUTO: 3.08 MILL/UL (ref 3.8–5.4)
WBC: 4 THOU/UL (ref 4–11)

## 2020-08-11 ENCOUNTER — AMBULATORY - HEALTHEAST (OUTPATIENT)
Dept: ONCOLOGY | Facility: CLINIC | Age: 55
End: 2020-08-11

## 2020-08-14 ENCOUNTER — OFFICE VISIT - HEALTHEAST (OUTPATIENT)
Dept: ONCOLOGY | Facility: CLINIC | Age: 55
End: 2020-08-14

## 2020-08-14 ENCOUNTER — AMBULATORY - HEALTHEAST (OUTPATIENT)
Dept: INFUSION THERAPY | Facility: CLINIC | Age: 55
End: 2020-08-14

## 2020-08-14 ENCOUNTER — INFUSION - HEALTHEAST (OUTPATIENT)
Dept: INFUSION THERAPY | Facility: CLINIC | Age: 55
End: 2020-08-14

## 2020-08-14 DIAGNOSIS — C50.211 MALIGNANT NEOPLASM OF UPPER-INNER QUADRANT OF RIGHT BREAST IN FEMALE, ESTROGEN RECEPTOR POSITIVE (H): ICD-10-CM

## 2020-08-14 DIAGNOSIS — Z51.11 ENCOUNTER FOR ANTINEOPLASTIC CHEMOTHERAPY: ICD-10-CM

## 2020-08-14 DIAGNOSIS — T45.1X5A CHEMOTHERAPY-INDUCED PERIPHERAL NEUROPATHY (H): ICD-10-CM

## 2020-08-14 DIAGNOSIS — G62.0 CHEMOTHERAPY-INDUCED PERIPHERAL NEUROPATHY (H): ICD-10-CM

## 2020-08-14 DIAGNOSIS — Z17.0 MALIGNANT NEOPLASM OF UPPER-INNER QUADRANT OF RIGHT BREAST IN FEMALE, ESTROGEN RECEPTOR POSITIVE (H): ICD-10-CM

## 2020-08-14 LAB
ALBUMIN SERPL-MCNC: 3.6 G/DL (ref 3.5–5)
ALP SERPL-CCNC: 61 U/L (ref 45–120)
ALT SERPL W P-5'-P-CCNC: 25 U/L (ref 0–45)
ANION GAP SERPL CALCULATED.3IONS-SCNC: 9 MMOL/L (ref 5–18)
AST SERPL W P-5'-P-CCNC: 18 U/L (ref 0–40)
BASOPHILS # BLD AUTO: 0 THOU/UL (ref 0–0.2)
BASOPHILS NFR BLD AUTO: 1 % (ref 0–2)
BILIRUB SERPL-MCNC: 0.4 MG/DL (ref 0–1)
BUN SERPL-MCNC: 15 MG/DL (ref 8–22)
CALCIUM SERPL-MCNC: 9.3 MG/DL (ref 8.5–10.5)
CHLORIDE BLD-SCNC: 107 MMOL/L (ref 98–107)
CO2 SERPL-SCNC: 23 MMOL/L (ref 22–31)
CREAT SERPL-MCNC: 0.81 MG/DL (ref 0.6–1.1)
EOSINOPHIL # BLD AUTO: 0.1 THOU/UL (ref 0–0.4)
EOSINOPHIL NFR BLD AUTO: 3 % (ref 0–6)
ERYTHROCYTE [DISTWIDTH] IN BLOOD BY AUTOMATED COUNT: 14.4 % (ref 11–14.5)
GFR SERPL CREATININE-BSD FRML MDRD: >60 ML/MIN/1.73M2
GLUCOSE BLD-MCNC: 118 MG/DL (ref 70–125)
HCT VFR BLD AUTO: 30.8 % (ref 35–47)
HGB BLD-MCNC: 10.1 G/DL (ref 12–16)
LYMPHOCYTES # BLD AUTO: 0.6 THOU/UL (ref 0.8–4.4)
LYMPHOCYTES NFR BLD AUTO: 16 % (ref 20–40)
MCH RBC QN AUTO: 32.7 PG (ref 27–34)
MCHC RBC AUTO-ENTMCNC: 32.8 G/DL (ref 32–36)
MCV RBC AUTO: 100 FL (ref 80–100)
MONOCYTES # BLD AUTO: 0.3 THOU/UL (ref 0–0.9)
MONOCYTES NFR BLD AUTO: 8 % (ref 2–10)
NEUTROPHILS # BLD AUTO: 2.8 THOU/UL (ref 2–7.7)
NEUTROPHILS NFR BLD AUTO: 72 % (ref 50–70)
PLATELET # BLD AUTO: 248 THOU/UL (ref 140–440)
PMV BLD AUTO: 9 FL (ref 8.5–12.5)
POTASSIUM BLD-SCNC: 4.1 MMOL/L (ref 3.5–5)
PROT SERPL-MCNC: 6.3 G/DL (ref 6–8)
RBC # BLD AUTO: 3.09 MILL/UL (ref 3.8–5.4)
SODIUM SERPL-SCNC: 139 MMOL/L (ref 136–145)
WBC: 3.8 THOU/UL (ref 4–11)

## 2020-08-21 ENCOUNTER — INFUSION - HEALTHEAST (OUTPATIENT)
Dept: INFUSION THERAPY | Facility: CLINIC | Age: 55
End: 2020-08-21

## 2020-08-21 DIAGNOSIS — Z51.11 ENCOUNTER FOR ANTINEOPLASTIC CHEMOTHERAPY: ICD-10-CM

## 2020-08-21 DIAGNOSIS — Z17.0 MALIGNANT NEOPLASM OF UPPER-INNER QUADRANT OF RIGHT BREAST IN FEMALE, ESTROGEN RECEPTOR POSITIVE (H): ICD-10-CM

## 2020-08-21 DIAGNOSIS — C50.211 MALIGNANT NEOPLASM OF UPPER-INNER QUADRANT OF RIGHT BREAST IN FEMALE, ESTROGEN RECEPTOR POSITIVE (H): ICD-10-CM

## 2020-08-21 LAB
BASOPHILS # BLD AUTO: 0 THOU/UL (ref 0–0.2)
BASOPHILS NFR BLD AUTO: 1 % (ref 0–2)
EOSINOPHIL # BLD AUTO: 0.1 THOU/UL (ref 0–0.4)
EOSINOPHIL NFR BLD AUTO: 3 % (ref 0–6)
ERYTHROCYTE [DISTWIDTH] IN BLOOD BY AUTOMATED COUNT: 13.5 % (ref 11–14.5)
HCT VFR BLD AUTO: 30.8 % (ref 35–47)
HGB BLD-MCNC: 10.3 G/DL (ref 12–16)
IMM GRANULOCYTES # BLD: 0 THOU/UL
IMM GRANULOCYTES NFR BLD: 1 %
LYMPHOCYTES # BLD AUTO: 0.7 THOU/UL (ref 0.8–4.4)
LYMPHOCYTES NFR BLD AUTO: 18 % (ref 20–40)
MCH RBC QN AUTO: 32.8 PG (ref 27–34)
MCHC RBC AUTO-ENTMCNC: 33.4 G/DL (ref 32–36)
MCV RBC AUTO: 98 FL (ref 80–100)
MONOCYTES # BLD AUTO: 0.4 THOU/UL (ref 0–0.9)
MONOCYTES NFR BLD AUTO: 11 % (ref 2–10)
NEUTROPHILS # BLD AUTO: 2.5 THOU/UL (ref 2–7.7)
NEUTROPHILS NFR BLD AUTO: 68 % (ref 50–70)
PLATELET # BLD AUTO: 262 THOU/UL (ref 140–440)
PMV BLD AUTO: 8.9 FL (ref 8.5–12.5)
RBC # BLD AUTO: 3.14 MILL/UL (ref 3.8–5.4)
WBC: 3.7 THOU/UL (ref 4–11)

## 2020-08-28 ENCOUNTER — INFUSION - HEALTHEAST (OUTPATIENT)
Dept: INFUSION THERAPY | Facility: CLINIC | Age: 55
End: 2020-08-28

## 2020-08-28 DIAGNOSIS — Z51.11 ENCOUNTER FOR ANTINEOPLASTIC CHEMOTHERAPY: ICD-10-CM

## 2020-08-28 DIAGNOSIS — Z17.0 MALIGNANT NEOPLASM OF UPPER-INNER QUADRANT OF RIGHT BREAST IN FEMALE, ESTROGEN RECEPTOR POSITIVE (H): ICD-10-CM

## 2020-08-28 DIAGNOSIS — C50.211 MALIGNANT NEOPLASM OF UPPER-INNER QUADRANT OF RIGHT BREAST IN FEMALE, ESTROGEN RECEPTOR POSITIVE (H): ICD-10-CM

## 2020-08-28 LAB
BASOPHILS # BLD AUTO: 0 THOU/UL (ref 0–0.2)
BASOPHILS NFR BLD AUTO: 1 % (ref 0–2)
EOSINOPHIL # BLD AUTO: 0.1 THOU/UL (ref 0–0.4)
EOSINOPHIL NFR BLD AUTO: 3 % (ref 0–6)
ERYTHROCYTE [DISTWIDTH] IN BLOOD BY AUTOMATED COUNT: 13 % (ref 11–14.5)
HCT VFR BLD AUTO: 29.5 % (ref 35–47)
HGB BLD-MCNC: 9.9 G/DL (ref 12–16)
IMM GRANULOCYTES # BLD: 0 THOU/UL
IMM GRANULOCYTES NFR BLD: 1 %
LYMPHOCYTES # BLD AUTO: 0.6 THOU/UL (ref 0.8–4.4)
LYMPHOCYTES NFR BLD AUTO: 19 % (ref 20–40)
MCH RBC QN AUTO: 33 PG (ref 27–34)
MCHC RBC AUTO-ENTMCNC: 33.6 G/DL (ref 32–36)
MCV RBC AUTO: 98 FL (ref 80–100)
MONOCYTES # BLD AUTO: 0.4 THOU/UL (ref 0–0.9)
MONOCYTES NFR BLD AUTO: 12 % (ref 2–10)
NEUTROPHILS # BLD AUTO: 1.9 THOU/UL (ref 2–7.7)
NEUTROPHILS NFR BLD AUTO: 64 % (ref 50–70)
PLATELET # BLD AUTO: 226 THOU/UL (ref 140–440)
PMV BLD AUTO: 8.9 FL (ref 8.5–12.5)
RBC # BLD AUTO: 3 MILL/UL (ref 3.8–5.4)
WBC: 3 THOU/UL (ref 4–11)

## 2020-08-31 ENCOUNTER — AMBULATORY - HEALTHEAST (OUTPATIENT)
Dept: ONCOLOGY | Facility: CLINIC | Age: 55
End: 2020-08-31

## 2020-09-04 ENCOUNTER — OFFICE VISIT - HEALTHEAST (OUTPATIENT)
Dept: ONCOLOGY | Facility: CLINIC | Age: 55
End: 2020-09-04

## 2020-09-04 ENCOUNTER — AMBULATORY - HEALTHEAST (OUTPATIENT)
Dept: INFUSION THERAPY | Facility: CLINIC | Age: 55
End: 2020-09-04

## 2020-09-04 ENCOUNTER — INFUSION - HEALTHEAST (OUTPATIENT)
Dept: INFUSION THERAPY | Facility: CLINIC | Age: 55
End: 2020-09-04

## 2020-09-04 DIAGNOSIS — C50.211 MALIGNANT NEOPLASM OF UPPER-INNER QUADRANT OF RIGHT BREAST IN FEMALE, ESTROGEN RECEPTOR POSITIVE (H): ICD-10-CM

## 2020-09-04 DIAGNOSIS — Z17.0 MALIGNANT NEOPLASM OF UPPER-INNER QUADRANT OF RIGHT BREAST IN FEMALE, ESTROGEN RECEPTOR POSITIVE (H): ICD-10-CM

## 2020-09-04 DIAGNOSIS — Z51.11 ENCOUNTER FOR ANTINEOPLASTIC CHEMOTHERAPY: ICD-10-CM

## 2020-09-04 LAB
ALBUMIN SERPL-MCNC: 3.7 G/DL (ref 3.5–5)
ALP SERPL-CCNC: 53 U/L (ref 45–120)
ALT SERPL W P-5'-P-CCNC: 35 U/L (ref 0–45)
ANION GAP SERPL CALCULATED.3IONS-SCNC: 8 MMOL/L (ref 5–18)
AST SERPL W P-5'-P-CCNC: 23 U/L (ref 0–40)
BASOPHILS # BLD AUTO: 0 THOU/UL (ref 0–0.2)
BASOPHILS NFR BLD AUTO: 1 % (ref 0–2)
BILIRUB SERPL-MCNC: 0.4 MG/DL (ref 0–1)
BUN SERPL-MCNC: 13 MG/DL (ref 8–22)
CALCIUM SERPL-MCNC: 9.2 MG/DL (ref 8.5–10.5)
CHLORIDE BLD-SCNC: 107 MMOL/L (ref 98–107)
CO2 SERPL-SCNC: 24 MMOL/L (ref 22–31)
CREAT SERPL-MCNC: 0.74 MG/DL (ref 0.6–1.1)
EOSINOPHIL # BLD AUTO: 0.1 THOU/UL (ref 0–0.4)
EOSINOPHIL NFR BLD AUTO: 3 % (ref 0–6)
ERYTHROCYTE [DISTWIDTH] IN BLOOD BY AUTOMATED COUNT: 12.9 % (ref 11–14.5)
GFR SERPL CREATININE-BSD FRML MDRD: >60 ML/MIN/1.73M2
GLUCOSE BLD-MCNC: 78 MG/DL (ref 70–125)
HCT VFR BLD AUTO: 32.5 % (ref 35–47)
HGB BLD-MCNC: 10.6 G/DL (ref 12–16)
IMM GRANULOCYTES # BLD: 0 THOU/UL
IMM GRANULOCYTES NFR BLD: 0 %
LYMPHOCYTES # BLD AUTO: 0.6 THOU/UL (ref 0.8–4.4)
LYMPHOCYTES NFR BLD AUTO: 26 % (ref 20–40)
MCH RBC QN AUTO: 32.2 PG (ref 27–34)
MCHC RBC AUTO-ENTMCNC: 32.6 G/DL (ref 32–36)
MCV RBC AUTO: 99 FL (ref 80–100)
MONOCYTES # BLD AUTO: 0.4 THOU/UL (ref 0–0.9)
MONOCYTES NFR BLD AUTO: 16 % (ref 2–10)
NEUTROPHILS # BLD AUTO: 1.3 THOU/UL (ref 2–7.7)
NEUTROPHILS NFR BLD AUTO: 54 % (ref 50–70)
PLATELET # BLD AUTO: 257 THOU/UL (ref 140–440)
PMV BLD AUTO: 8.9 FL (ref 8.5–12.5)
POTASSIUM BLD-SCNC: 3.8 MMOL/L (ref 3.5–5)
PROT SERPL-MCNC: 6.4 G/DL (ref 6–8)
RBC # BLD AUTO: 3.29 MILL/UL (ref 3.8–5.4)
SODIUM SERPL-SCNC: 139 MMOL/L (ref 136–145)
WBC: 2.5 THOU/UL (ref 4–11)

## 2020-09-08 ENCOUNTER — COMMUNICATION - HEALTHEAST (OUTPATIENT)
Dept: ADMINISTRATIVE | Facility: HOSPITAL | Age: 55
End: 2020-09-08

## 2020-09-11 ENCOUNTER — INFUSION - HEALTHEAST (OUTPATIENT)
Dept: INFUSION THERAPY | Facility: CLINIC | Age: 55
End: 2020-09-11

## 2020-09-11 DIAGNOSIS — C50.211 MALIGNANT NEOPLASM OF UPPER-INNER QUADRANT OF RIGHT BREAST IN FEMALE, ESTROGEN RECEPTOR POSITIVE (H): ICD-10-CM

## 2020-09-11 DIAGNOSIS — Z51.11 ENCOUNTER FOR ANTINEOPLASTIC CHEMOTHERAPY: ICD-10-CM

## 2020-09-11 DIAGNOSIS — Z17.0 MALIGNANT NEOPLASM OF UPPER-INNER QUADRANT OF RIGHT BREAST IN FEMALE, ESTROGEN RECEPTOR POSITIVE (H): ICD-10-CM

## 2020-09-11 LAB
BASOPHILS # BLD AUTO: 0 THOU/UL (ref 0–0.2)
BASOPHILS NFR BLD AUTO: 1 % (ref 0–2)
EOSINOPHIL # BLD AUTO: 0.1 THOU/UL (ref 0–0.4)
EOSINOPHIL NFR BLD AUTO: 3 % (ref 0–6)
ERYTHROCYTE [DISTWIDTH] IN BLOOD BY AUTOMATED COUNT: 13 % (ref 11–14.5)
HCT VFR BLD AUTO: 31.4 % (ref 35–47)
HGB BLD-MCNC: 10.5 G/DL (ref 12–16)
IMM GRANULOCYTES # BLD: 0 THOU/UL
IMM GRANULOCYTES NFR BLD: 1 %
LYMPHOCYTES # BLD AUTO: 0.6 THOU/UL (ref 0.8–4.4)
LYMPHOCYTES NFR BLD AUTO: 24 % (ref 20–40)
MCH RBC QN AUTO: 32.7 PG (ref 27–34)
MCHC RBC AUTO-ENTMCNC: 33.4 G/DL (ref 32–36)
MCV RBC AUTO: 98 FL (ref 80–100)
MONOCYTES # BLD AUTO: 0.4 THOU/UL (ref 0–0.9)
MONOCYTES NFR BLD AUTO: 18 % (ref 2–10)
NEUTROPHILS # BLD AUTO: 1.2 THOU/UL (ref 2–7.7)
NEUTROPHILS NFR BLD AUTO: 53 % (ref 50–70)
PLATELET # BLD AUTO: 229 THOU/UL (ref 140–440)
PMV BLD AUTO: 8.9 FL (ref 8.5–12.5)
RBC # BLD AUTO: 3.21 MILL/UL (ref 3.8–5.4)
WBC: 2.4 THOU/UL (ref 4–11)

## 2020-09-15 ENCOUNTER — AMBULATORY - HEALTHEAST (OUTPATIENT)
Dept: RADIATION ONCOLOGY | Facility: HOSPITAL | Age: 55
End: 2020-09-15

## 2020-09-15 ENCOUNTER — OFFICE VISIT - HEALTHEAST (OUTPATIENT)
Dept: RADIATION ONCOLOGY | Facility: HOSPITAL | Age: 55
End: 2020-09-15

## 2020-09-15 DIAGNOSIS — Z17.0 MALIGNANT NEOPLASM OF UPPER-INNER QUADRANT OF RIGHT BREAST IN FEMALE, ESTROGEN RECEPTOR POSITIVE (H): ICD-10-CM

## 2020-09-15 DIAGNOSIS — C50.211 MALIGNANT NEOPLASM OF UPPER-INNER QUADRANT OF RIGHT BREAST IN FEMALE, ESTROGEN RECEPTOR POSITIVE (H): ICD-10-CM

## 2020-09-17 ENCOUNTER — COMMUNICATION - HEALTHEAST (OUTPATIENT)
Dept: ONCOLOGY | Facility: CLINIC | Age: 55
End: 2020-09-17

## 2020-09-18 ENCOUNTER — INFUSION - HEALTHEAST (OUTPATIENT)
Dept: INFUSION THERAPY | Facility: CLINIC | Age: 55
End: 2020-09-18

## 2020-09-18 DIAGNOSIS — C50.211 MALIGNANT NEOPLASM OF UPPER-INNER QUADRANT OF RIGHT BREAST IN FEMALE, ESTROGEN RECEPTOR POSITIVE (H): ICD-10-CM

## 2020-09-18 DIAGNOSIS — Z51.11 ENCOUNTER FOR ANTINEOPLASTIC CHEMOTHERAPY: ICD-10-CM

## 2020-09-18 DIAGNOSIS — Z17.0 MALIGNANT NEOPLASM OF UPPER-INNER QUADRANT OF RIGHT BREAST IN FEMALE, ESTROGEN RECEPTOR POSITIVE (H): ICD-10-CM

## 2020-09-18 LAB
BASOPHILS # BLD AUTO: 0 THOU/UL (ref 0–0.2)
BASOPHILS NFR BLD AUTO: 1 % (ref 0–2)
EOSINOPHIL # BLD AUTO: 0.1 THOU/UL (ref 0–0.4)
EOSINOPHIL NFR BLD AUTO: 4 % (ref 0–6)
ERYTHROCYTE [DISTWIDTH] IN BLOOD BY AUTOMATED COUNT: 12.8 % (ref 11–14.5)
HCT VFR BLD AUTO: 33.6 % (ref 35–47)
HGB BLD-MCNC: 11 G/DL (ref 12–16)
IMM GRANULOCYTES # BLD: 0 THOU/UL
IMM GRANULOCYTES NFR BLD: 1 %
LYMPHOCYTES # BLD AUTO: 0.9 THOU/UL (ref 0.8–4.4)
LYMPHOCYTES NFR BLD AUTO: 24 % (ref 20–40)
MCH RBC QN AUTO: 31.9 PG (ref 27–34)
MCHC RBC AUTO-ENTMCNC: 32.7 G/DL (ref 32–36)
MCV RBC AUTO: 97 FL (ref 80–100)
MONOCYTES # BLD AUTO: 0.4 THOU/UL (ref 0–0.9)
MONOCYTES NFR BLD AUTO: 10 % (ref 2–10)
NEUTROPHILS # BLD AUTO: 2.3 THOU/UL (ref 2–7.7)
NEUTROPHILS NFR BLD AUTO: 62 % (ref 50–70)
PLATELET # BLD AUTO: 234 THOU/UL (ref 140–440)
PMV BLD AUTO: 8.4 FL (ref 8.5–12.5)
RBC # BLD AUTO: 3.45 MILL/UL (ref 3.8–5.4)
WBC: 3.7 THOU/UL (ref 4–11)

## 2020-09-24 ENCOUNTER — COMMUNICATION - HEALTHEAST (OUTPATIENT)
Dept: ONCOLOGY | Facility: CLINIC | Age: 55
End: 2020-09-24

## 2020-09-25 ENCOUNTER — COMMUNICATION - HEALTHEAST (OUTPATIENT)
Dept: ONCOLOGY | Facility: CLINIC | Age: 55
End: 2020-09-25

## 2020-09-25 ENCOUNTER — SURGERY - HEALTHEAST (OUTPATIENT)
Dept: SURGERY | Facility: CLINIC | Age: 55
End: 2020-09-25

## 2020-09-25 DIAGNOSIS — C50.211 MALIGNANT NEOPLASM OF UPPER-INNER QUADRANT OF RIGHT FEMALE BREAST (H): ICD-10-CM

## 2020-09-25 DIAGNOSIS — C50.211 MALIGNANT NEOPLASM OF UPPER-INNER QUADRANT OF RIGHT BREAST IN FEMALE, ESTROGEN RECEPTOR POSITIVE (H): ICD-10-CM

## 2020-09-25 DIAGNOSIS — Z17.0 MALIGNANT NEOPLASM OF UPPER-INNER QUADRANT OF RIGHT BREAST IN FEMALE, ESTROGEN RECEPTOR POSITIVE (H): ICD-10-CM

## 2020-09-26 ENCOUNTER — COMMUNICATION - HEALTHEAST (OUTPATIENT)
Dept: FAMILY MEDICINE | Facility: CLINIC | Age: 55
End: 2020-09-26

## 2020-09-28 ENCOUNTER — AMBULATORY - HEALTHEAST (OUTPATIENT)
Dept: SURGERY | Facility: AMBULATORY SURGERY CENTER | Age: 55
End: 2020-09-28

## 2020-09-28 ENCOUNTER — OFFICE VISIT - HEALTHEAST (OUTPATIENT)
Dept: ONCOLOGY | Facility: CLINIC | Age: 55
End: 2020-09-28

## 2020-09-28 ENCOUNTER — AMBULATORY - HEALTHEAST (OUTPATIENT)
Dept: RADIATION ONCOLOGY | Facility: HOSPITAL | Age: 55
End: 2020-09-28

## 2020-09-28 DIAGNOSIS — Z11.59 ENCOUNTER FOR SCREENING FOR OTHER VIRAL DISEASES: ICD-10-CM

## 2020-09-28 DIAGNOSIS — G62.0 CHEMOTHERAPY-INDUCED PERIPHERAL NEUROPATHY (H): ICD-10-CM

## 2020-09-28 DIAGNOSIS — Z17.0 MALIGNANT NEOPLASM OF UPPER-INNER QUADRANT OF RIGHT BREAST IN FEMALE, ESTROGEN RECEPTOR POSITIVE (H): ICD-10-CM

## 2020-09-28 DIAGNOSIS — T45.1X5A CHEMOTHERAPY-INDUCED PERIPHERAL NEUROPATHY (H): ICD-10-CM

## 2020-09-28 DIAGNOSIS — C50.211 MALIGNANT NEOPLASM OF UPPER-INNER QUADRANT OF RIGHT BREAST IN FEMALE, ESTROGEN RECEPTOR POSITIVE (H): ICD-10-CM

## 2020-09-30 ENCOUNTER — COMMUNICATION - HEALTHEAST (OUTPATIENT)
Dept: SURGERY | Facility: CLINIC | Age: 55
End: 2020-09-30

## 2020-10-06 ENCOUNTER — AMBULATORY - HEALTHEAST (OUTPATIENT)
Dept: FAMILY MEDICINE | Facility: CLINIC | Age: 55
End: 2020-10-06

## 2020-10-06 DIAGNOSIS — Z11.59 ENCOUNTER FOR SCREENING FOR OTHER VIRAL DISEASES: ICD-10-CM

## 2020-10-08 ENCOUNTER — COMMUNICATION - HEALTHEAST (OUTPATIENT)
Dept: SCHEDULING | Facility: CLINIC | Age: 55
End: 2020-10-08

## 2020-10-08 ASSESSMENT — MIFFLIN-ST. JEOR: SCORE: 1415.55

## 2020-10-09 ENCOUNTER — SURGERY - HEALTHEAST (OUTPATIENT)
Dept: SURGERY | Facility: AMBULATORY SURGERY CENTER | Age: 55
End: 2020-10-09

## 2020-10-09 ENCOUNTER — ANESTHESIA - HEALTHEAST (OUTPATIENT)
Dept: SURGERY | Facility: AMBULATORY SURGERY CENTER | Age: 55
End: 2020-10-09

## 2020-10-09 ASSESSMENT — MIFFLIN-ST. JEOR: SCORE: 1415.55

## 2020-10-15 ENCOUNTER — AMBULATORY - HEALTHEAST (OUTPATIENT)
Dept: LAB | Facility: CLINIC | Age: 55
End: 2020-10-15

## 2020-10-15 DIAGNOSIS — C50.211 MALIGNANT NEOPLASM OF UPPER-INNER QUADRANT OF RIGHT BREAST IN FEMALE, ESTROGEN RECEPTOR POSITIVE (H): ICD-10-CM

## 2020-10-15 DIAGNOSIS — Z17.0 MALIGNANT NEOPLASM OF UPPER-INNER QUADRANT OF RIGHT BREAST IN FEMALE, ESTROGEN RECEPTOR POSITIVE (H): ICD-10-CM

## 2020-10-17 ENCOUNTER — COMMUNICATION - HEALTHEAST (OUTPATIENT)
Dept: SCHEDULING | Facility: CLINIC | Age: 55
End: 2020-10-17

## 2020-10-20 ENCOUNTER — OFFICE VISIT - HEALTHEAST (OUTPATIENT)
Dept: RADIATION ONCOLOGY | Facility: CLINIC | Age: 55
End: 2020-10-20

## 2020-10-20 ENCOUNTER — COMMUNICATION - HEALTHEAST (OUTPATIENT)
Dept: ONCOLOGY | Facility: CLINIC | Age: 55
End: 2020-10-20

## 2020-10-20 DIAGNOSIS — Z17.0 MALIGNANT NEOPLASM OF UPPER-INNER QUADRANT OF RIGHT BREAST IN FEMALE, ESTROGEN RECEPTOR POSITIVE (H): ICD-10-CM

## 2020-10-20 DIAGNOSIS — C50.211 MALIGNANT NEOPLASM OF UPPER-INNER QUADRANT OF RIGHT BREAST IN FEMALE, ESTROGEN RECEPTOR POSITIVE (H): ICD-10-CM

## 2020-10-27 ENCOUNTER — OFFICE VISIT - HEALTHEAST (OUTPATIENT)
Dept: RADIATION ONCOLOGY | Facility: CLINIC | Age: 55
End: 2020-10-27

## 2020-10-27 DIAGNOSIS — Z17.0 MALIGNANT NEOPLASM OF UPPER-INNER QUADRANT OF RIGHT BREAST IN FEMALE, ESTROGEN RECEPTOR POSITIVE (H): ICD-10-CM

## 2020-10-27 DIAGNOSIS — C50.211 MALIGNANT NEOPLASM OF UPPER-INNER QUADRANT OF RIGHT BREAST IN FEMALE, ESTROGEN RECEPTOR POSITIVE (H): ICD-10-CM

## 2020-11-03 ENCOUNTER — OFFICE VISIT - HEALTHEAST (OUTPATIENT)
Dept: RADIATION ONCOLOGY | Facility: CLINIC | Age: 55
End: 2020-11-03

## 2020-11-03 DIAGNOSIS — C50.211 MALIGNANT NEOPLASM OF UPPER-INNER QUADRANT OF RIGHT BREAST IN FEMALE, ESTROGEN RECEPTOR POSITIVE (H): ICD-10-CM

## 2020-11-03 DIAGNOSIS — Z17.0 MALIGNANT NEOPLASM OF UPPER-INNER QUADRANT OF RIGHT BREAST IN FEMALE, ESTROGEN RECEPTOR POSITIVE (H): ICD-10-CM

## 2020-11-06 ENCOUNTER — OFFICE VISIT - HEALTHEAST (OUTPATIENT)
Dept: ONCOLOGY | Facility: CLINIC | Age: 55
End: 2020-11-06

## 2020-11-06 ENCOUNTER — AMBULATORY - HEALTHEAST (OUTPATIENT)
Dept: INFUSION THERAPY | Facility: CLINIC | Age: 55
End: 2020-11-06

## 2020-11-06 DIAGNOSIS — C50.211 MALIGNANT NEOPLASM OF UPPER-INNER QUADRANT OF RIGHT BREAST IN FEMALE, ESTROGEN RECEPTOR POSITIVE (H): ICD-10-CM

## 2020-11-06 DIAGNOSIS — L58.9 RADIATION DERMATITIS: ICD-10-CM

## 2020-11-06 DIAGNOSIS — G62.0 CHEMOTHERAPY-INDUCED PERIPHERAL NEUROPATHY (H): ICD-10-CM

## 2020-11-06 DIAGNOSIS — Z17.0 MALIGNANT NEOPLASM OF UPPER-INNER QUADRANT OF RIGHT BREAST IN FEMALE, ESTROGEN RECEPTOR POSITIVE (H): ICD-10-CM

## 2020-11-06 DIAGNOSIS — T45.1X5A CHEMOTHERAPY-INDUCED PERIPHERAL NEUROPATHY (H): ICD-10-CM

## 2020-11-06 LAB
ALBUMIN SERPL-MCNC: 3.9 G/DL (ref 3.5–5)
ALP SERPL-CCNC: 67 U/L (ref 45–120)
ALT SERPL W P-5'-P-CCNC: 16 U/L (ref 0–45)
ANION GAP SERPL CALCULATED.3IONS-SCNC: 11 MMOL/L (ref 5–18)
AST SERPL W P-5'-P-CCNC: 15 U/L (ref 0–40)
BASOPHILS # BLD AUTO: 0 THOU/UL (ref 0–0.2)
BASOPHILS NFR BLD AUTO: 0 % (ref 0–2)
BILIRUB SERPL-MCNC: 0.4 MG/DL (ref 0–1)
BUN SERPL-MCNC: 13 MG/DL (ref 8–22)
CALCIUM SERPL-MCNC: 9.3 MG/DL (ref 8.5–10.5)
CHLORIDE BLD-SCNC: 105 MMOL/L (ref 98–107)
CO2 SERPL-SCNC: 24 MMOL/L (ref 22–31)
CREAT SERPL-MCNC: 0.86 MG/DL (ref 0.6–1.1)
EOSINOPHIL # BLD AUTO: 0.1 THOU/UL (ref 0–0.4)
EOSINOPHIL NFR BLD AUTO: 5 % (ref 0–6)
ERYTHROCYTE [DISTWIDTH] IN BLOOD BY AUTOMATED COUNT: 12.4 % (ref 11–14.5)
GFR SERPL CREATININE-BSD FRML MDRD: >60 ML/MIN/1.73M2
GLUCOSE BLD-MCNC: 98 MG/DL (ref 70–125)
HCT VFR BLD AUTO: 38.3 % (ref 35–47)
HGB BLD-MCNC: 12.7 G/DL (ref 12–16)
IMM GRANULOCYTES # BLD: 0 THOU/UL
IMM GRANULOCYTES NFR BLD: 0 %
LYMPHOCYTES # BLD AUTO: 0.4 THOU/UL (ref 0.8–4.4)
LYMPHOCYTES NFR BLD AUTO: 15 % (ref 20–40)
MCH RBC QN AUTO: 30.3 PG (ref 27–34)
MCHC RBC AUTO-ENTMCNC: 33.2 G/DL (ref 32–36)
MCV RBC AUTO: 91 FL (ref 80–100)
MONOCYTES # BLD AUTO: 0.3 THOU/UL (ref 0–0.9)
MONOCYTES NFR BLD AUTO: 10 % (ref 2–10)
NEUTROPHILS # BLD AUTO: 2.1 THOU/UL (ref 2–7.7)
NEUTROPHILS NFR BLD AUTO: 70 % (ref 50–70)
PLATELET # BLD AUTO: 190 THOU/UL (ref 140–440)
PMV BLD AUTO: 8.9 FL (ref 8.5–12.5)
POTASSIUM BLD-SCNC: 4 MMOL/L (ref 3.5–5)
PROT SERPL-MCNC: 6.6 G/DL (ref 6–8)
RBC # BLD AUTO: 4.19 MILL/UL (ref 3.8–5.4)
SODIUM SERPL-SCNC: 140 MMOL/L (ref 136–145)
WBC: 3 THOU/UL (ref 4–11)

## 2020-11-10 ENCOUNTER — OFFICE VISIT - HEALTHEAST (OUTPATIENT)
Dept: RADIATION ONCOLOGY | Facility: CLINIC | Age: 55
End: 2020-11-10

## 2020-11-10 DIAGNOSIS — Z17.0 MALIGNANT NEOPLASM OF UPPER-INNER QUADRANT OF RIGHT BREAST IN FEMALE, ESTROGEN RECEPTOR POSITIVE (H): ICD-10-CM

## 2020-11-10 DIAGNOSIS — C50.211 MALIGNANT NEOPLASM OF UPPER-INNER QUADRANT OF RIGHT BREAST IN FEMALE, ESTROGEN RECEPTOR POSITIVE (H): ICD-10-CM

## 2020-11-17 ENCOUNTER — OFFICE VISIT - HEALTHEAST (OUTPATIENT)
Dept: RADIATION ONCOLOGY | Facility: CLINIC | Age: 55
End: 2020-11-17

## 2020-11-17 DIAGNOSIS — C50.211 MALIGNANT NEOPLASM OF UPPER-INNER QUADRANT OF RIGHT BREAST IN FEMALE, ESTROGEN RECEPTOR POSITIVE (H): ICD-10-CM

## 2020-11-17 DIAGNOSIS — Z17.0 MALIGNANT NEOPLASM OF UPPER-INNER QUADRANT OF RIGHT BREAST IN FEMALE, ESTROGEN RECEPTOR POSITIVE (H): ICD-10-CM

## 2020-11-24 ENCOUNTER — OFFICE VISIT - HEALTHEAST (OUTPATIENT)
Dept: RADIATION ONCOLOGY | Facility: CLINIC | Age: 55
End: 2020-11-24

## 2020-11-24 DIAGNOSIS — Z17.0 MALIGNANT NEOPLASM OF UPPER-INNER QUADRANT OF RIGHT BREAST IN FEMALE, ESTROGEN RECEPTOR POSITIVE (H): ICD-10-CM

## 2020-11-24 DIAGNOSIS — C50.211 MALIGNANT NEOPLASM OF UPPER-INNER QUADRANT OF RIGHT BREAST IN FEMALE, ESTROGEN RECEPTOR POSITIVE (H): ICD-10-CM

## 2020-11-27 ENCOUNTER — OFFICE VISIT - HEALTHEAST (OUTPATIENT)
Dept: RADIATION ONCOLOGY | Facility: CLINIC | Age: 55
End: 2020-11-27

## 2020-11-27 DIAGNOSIS — L58.9 RADIATION DERMATITIS: ICD-10-CM

## 2020-11-27 DIAGNOSIS — Z17.0 MALIGNANT NEOPLASM OF UPPER-INNER QUADRANT OF RIGHT BREAST IN FEMALE, ESTROGEN RECEPTOR POSITIVE (H): ICD-10-CM

## 2020-11-27 DIAGNOSIS — C50.211 MALIGNANT NEOPLASM OF UPPER-INNER QUADRANT OF RIGHT BREAST IN FEMALE, ESTROGEN RECEPTOR POSITIVE (H): ICD-10-CM

## 2020-12-01 ENCOUNTER — OFFICE VISIT - HEALTHEAST (OUTPATIENT)
Dept: RADIATION ONCOLOGY | Facility: CLINIC | Age: 55
End: 2020-12-01

## 2020-12-01 ENCOUNTER — AMBULATORY - HEALTHEAST (OUTPATIENT)
Dept: RADIATION ONCOLOGY | Facility: CLINIC | Age: 55
End: 2020-12-01

## 2020-12-01 DIAGNOSIS — C50.211 MALIGNANT NEOPLASM OF UPPER-INNER QUADRANT OF RIGHT BREAST IN FEMALE, ESTROGEN RECEPTOR POSITIVE (H): ICD-10-CM

## 2020-12-01 DIAGNOSIS — Z17.0 MALIGNANT NEOPLASM OF UPPER-INNER QUADRANT OF RIGHT BREAST IN FEMALE, ESTROGEN RECEPTOR POSITIVE (H): ICD-10-CM

## 2020-12-03 ENCOUNTER — COMMUNICATION - HEALTHEAST (OUTPATIENT)
Dept: SURGERY | Facility: CLINIC | Age: 55
End: 2020-12-03

## 2020-12-03 ENCOUNTER — AMBULATORY - HEALTHEAST (OUTPATIENT)
Dept: RADIATION ONCOLOGY | Facility: CLINIC | Age: 55
End: 2020-12-03

## 2020-12-14 ENCOUNTER — COMMUNICATION - HEALTHEAST (OUTPATIENT)
Dept: RADIATION ONCOLOGY | Facility: CLINIC | Age: 55
End: 2020-12-14

## 2020-12-18 ENCOUNTER — AMBULATORY - HEALTHEAST (OUTPATIENT)
Dept: INFUSION THERAPY | Facility: CLINIC | Age: 55
End: 2020-12-18

## 2020-12-18 ENCOUNTER — OFFICE VISIT - HEALTHEAST (OUTPATIENT)
Dept: ONCOLOGY | Facility: CLINIC | Age: 55
End: 2020-12-18

## 2020-12-18 DIAGNOSIS — Z17.0 MALIGNANT NEOPLASM OF UPPER-INNER QUADRANT OF RIGHT BREAST IN FEMALE, ESTROGEN RECEPTOR POSITIVE (H): ICD-10-CM

## 2020-12-18 DIAGNOSIS — G62.0 CHEMOTHERAPY-INDUCED PERIPHERAL NEUROPATHY (H): ICD-10-CM

## 2020-12-18 DIAGNOSIS — C50.211 MALIGNANT NEOPLASM OF UPPER-INNER QUADRANT OF RIGHT BREAST IN FEMALE, ESTROGEN RECEPTOR POSITIVE (H): ICD-10-CM

## 2020-12-18 DIAGNOSIS — Z79.810 LONG-TERM CURRENT USE OF TAMOXIFEN: ICD-10-CM

## 2020-12-18 DIAGNOSIS — T45.1X5A CHEMOTHERAPY-INDUCED PERIPHERAL NEUROPATHY (H): ICD-10-CM

## 2020-12-18 DIAGNOSIS — L58.9 RADIATION DERMATITIS: ICD-10-CM

## 2020-12-18 LAB
ALBUMIN SERPL-MCNC: 3.9 G/DL (ref 3.5–5)
ALP SERPL-CCNC: 71 U/L (ref 45–120)
ALT SERPL W P-5'-P-CCNC: 21 U/L (ref 0–45)
AST SERPL W P-5'-P-CCNC: 18 U/L (ref 0–40)
BASOPHILS # BLD AUTO: 0 THOU/UL (ref 0–0.2)
BASOPHILS NFR BLD AUTO: 1 % (ref 0–2)
BILIRUB DIRECT SERPL-MCNC: 0.1 MG/DL
BILIRUB SERPL-MCNC: 0.5 MG/DL (ref 0–1)
EOSINOPHIL # BLD AUTO: 0.2 THOU/UL (ref 0–0.4)
EOSINOPHIL NFR BLD AUTO: 4 % (ref 0–6)
ERYTHROCYTE [DISTWIDTH] IN BLOOD BY AUTOMATED COUNT: 12.2 % (ref 11–14.5)
HCT VFR BLD AUTO: 38.4 % (ref 35–47)
HGB BLD-MCNC: 12.5 G/DL (ref 12–16)
IMM GRANULOCYTES # BLD: 0 THOU/UL
IMM GRANULOCYTES NFR BLD: 0 %
LYMPHOCYTES # BLD AUTO: 0.5 THOU/UL (ref 0.8–4.4)
LYMPHOCYTES NFR BLD AUTO: 15 % (ref 20–40)
MCH RBC QN AUTO: 29.4 PG (ref 27–34)
MCHC RBC AUTO-ENTMCNC: 32.6 G/DL (ref 32–36)
MCV RBC AUTO: 90 FL (ref 80–100)
MONOCYTES # BLD AUTO: 0.4 THOU/UL (ref 0–0.9)
MONOCYTES NFR BLD AUTO: 12 % (ref 2–10)
NEUTROPHILS # BLD AUTO: 2.3 THOU/UL (ref 2–7.7)
NEUTROPHILS NFR BLD AUTO: 67 % (ref 50–70)
PLATELET # BLD AUTO: 196 THOU/UL (ref 140–440)
PMV BLD AUTO: 8.8 FL (ref 8.5–12.5)
PROT SERPL-MCNC: 6.9 G/DL (ref 6–8)
RBC # BLD AUTO: 4.25 MILL/UL (ref 3.8–5.4)
WBC: 3.4 THOU/UL (ref 4–11)

## 2020-12-21 ENCOUNTER — COMMUNICATION - HEALTHEAST (OUTPATIENT)
Dept: ONCOLOGY | Facility: CLINIC | Age: 55
End: 2020-12-21

## 2021-01-05 ENCOUNTER — OFFICE VISIT - HEALTHEAST (OUTPATIENT)
Dept: RADIATION ONCOLOGY | Facility: CLINIC | Age: 56
End: 2021-01-05

## 2021-01-05 DIAGNOSIS — C50.211 MALIGNANT NEOPLASM OF UPPER-INNER QUADRANT OF RIGHT BREAST IN FEMALE, ESTROGEN RECEPTOR POSITIVE (H): ICD-10-CM

## 2021-01-05 DIAGNOSIS — Z17.0 MALIGNANT NEOPLASM OF UPPER-INNER QUADRANT OF RIGHT BREAST IN FEMALE, ESTROGEN RECEPTOR POSITIVE (H): ICD-10-CM

## 2021-01-19 ENCOUNTER — COMMUNICATION - HEALTHEAST (OUTPATIENT)
Dept: ADMINISTRATIVE | Facility: HOSPITAL | Age: 56
End: 2021-01-19

## 2021-03-12 ENCOUNTER — AMBULATORY - HEALTHEAST (OUTPATIENT)
Dept: NURSING | Facility: CLINIC | Age: 56
End: 2021-03-12

## 2021-04-02 ENCOUNTER — AMBULATORY - HEALTHEAST (OUTPATIENT)
Dept: NURSING | Facility: CLINIC | Age: 56
End: 2021-04-02

## 2021-04-09 ENCOUNTER — AMBULATORY - HEALTHEAST (OUTPATIENT)
Dept: INFUSION THERAPY | Facility: CLINIC | Age: 56
End: 2021-04-09

## 2021-04-09 ENCOUNTER — OFFICE VISIT - HEALTHEAST (OUTPATIENT)
Dept: ONCOLOGY | Facility: CLINIC | Age: 56
End: 2021-04-09

## 2021-04-09 DIAGNOSIS — H81.10 BENIGN PAROXYSMAL POSITIONAL VERTIGO, UNSPECIFIED LATERALITY: ICD-10-CM

## 2021-04-09 DIAGNOSIS — G62.0 CHEMOTHERAPY-INDUCED PERIPHERAL NEUROPATHY (H): ICD-10-CM

## 2021-04-09 DIAGNOSIS — Z17.0 MALIGNANT NEOPLASM OF UPPER-INNER QUADRANT OF RIGHT BREAST IN FEMALE, ESTROGEN RECEPTOR POSITIVE (H): ICD-10-CM

## 2021-04-09 DIAGNOSIS — C50.211 MALIGNANT NEOPLASM OF UPPER-INNER QUADRANT OF RIGHT BREAST IN FEMALE, ESTROGEN RECEPTOR POSITIVE (H): ICD-10-CM

## 2021-04-09 DIAGNOSIS — T45.1X5A CHEMOTHERAPY-INDUCED PERIPHERAL NEUROPATHY (H): ICD-10-CM

## 2021-04-09 LAB
BASOPHILS # BLD AUTO: 0 THOU/UL (ref 0–0.2)
BASOPHILS NFR BLD AUTO: 0 % (ref 0–2)
EOSINOPHIL # BLD AUTO: 0.1 THOU/UL (ref 0–0.4)
EOSINOPHIL NFR BLD AUTO: 3 % (ref 0–6)
ERYTHROCYTE [DISTWIDTH] IN BLOOD BY AUTOMATED COUNT: 12.5 % (ref 11–14.5)
HCT VFR BLD AUTO: 40 % (ref 35–47)
HGB BLD-MCNC: 13.3 G/DL (ref 12–16)
IMM GRANULOCYTES # BLD: 0 THOU/UL
IMM GRANULOCYTES NFR BLD: 0 %
LYMPHOCYTES # BLD AUTO: 0.8 THOU/UL (ref 0.8–4.4)
LYMPHOCYTES NFR BLD AUTO: 18 % (ref 20–40)
MCH RBC QN AUTO: 30.4 PG (ref 27–34)
MCHC RBC AUTO-ENTMCNC: 33.3 G/DL (ref 32–36)
MCV RBC AUTO: 92 FL (ref 80–100)
MONOCYTES # BLD AUTO: 0.5 THOU/UL (ref 0–0.9)
MONOCYTES NFR BLD AUTO: 11 % (ref 2–10)
NEUTROPHILS # BLD AUTO: 3 THOU/UL (ref 2–7.7)
NEUTROPHILS NFR BLD AUTO: 67 % (ref 50–70)
PLATELET # BLD AUTO: 221 THOU/UL (ref 140–440)
PMV BLD AUTO: 8.7 FL (ref 8.5–12.5)
RBC # BLD AUTO: 4.37 MILL/UL (ref 3.8–5.4)
WBC: 4.6 THOU/UL (ref 4–11)

## 2021-04-27 ENCOUNTER — COMMUNICATION - HEALTHEAST (OUTPATIENT)
Dept: ONCOLOGY | Facility: CLINIC | Age: 56
End: 2021-04-27

## 2021-05-26 VITALS
OXYGEN SATURATION: 98 % | TEMPERATURE: 98.4 F | SYSTOLIC BLOOD PRESSURE: 123 MMHG | DIASTOLIC BLOOD PRESSURE: 74 MMHG | HEART RATE: 91 BPM

## 2021-05-27 ENCOUNTER — RECORDS - HEALTHEAST (OUTPATIENT)
Dept: ADMINISTRATIVE | Facility: CLINIC | Age: 56
End: 2021-05-27

## 2021-05-27 VITALS
DIASTOLIC BLOOD PRESSURE: 79 MMHG | SYSTOLIC BLOOD PRESSURE: 118 MMHG | OXYGEN SATURATION: 99 % | TEMPERATURE: 98.7 F | HEART RATE: 99 BPM

## 2021-05-27 VITALS
OXYGEN SATURATION: 98 % | SYSTOLIC BLOOD PRESSURE: 138 MMHG | HEART RATE: 98 BPM | TEMPERATURE: 98.8 F | DIASTOLIC BLOOD PRESSURE: 87 MMHG

## 2021-05-27 VITALS
DIASTOLIC BLOOD PRESSURE: 83 MMHG | OXYGEN SATURATION: 96 % | SYSTOLIC BLOOD PRESSURE: 140 MMHG | HEART RATE: 98 BPM | TEMPERATURE: 98.7 F

## 2021-05-27 VITALS
SYSTOLIC BLOOD PRESSURE: 133 MMHG | HEART RATE: 98 BPM | TEMPERATURE: 98.3 F | DIASTOLIC BLOOD PRESSURE: 79 MMHG | OXYGEN SATURATION: 98 %

## 2021-05-28 ENCOUNTER — RECORDS - HEALTHEAST (OUTPATIENT)
Dept: ADMINISTRATIVE | Facility: CLINIC | Age: 56
End: 2021-05-28

## 2021-06-02 ENCOUNTER — COMMUNICATION - HEALTHEAST (OUTPATIENT)
Dept: ONCOLOGY | Facility: CLINIC | Age: 56
End: 2021-06-02

## 2021-06-02 ENCOUNTER — RECORDS - HEALTHEAST (OUTPATIENT)
Dept: ADMINISTRATIVE | Facility: CLINIC | Age: 56
End: 2021-06-02

## 2021-06-02 ENCOUNTER — COMMUNICATION - HEALTHEAST (OUTPATIENT)
Dept: ONCOLOGY | Facility: HOSPITAL | Age: 56
End: 2021-06-02

## 2021-06-02 DIAGNOSIS — T45.1X5A CHEMOTHERAPY-INDUCED PERIPHERAL NEUROPATHY (H): ICD-10-CM

## 2021-06-02 DIAGNOSIS — G62.0 CHEMOTHERAPY-INDUCED PERIPHERAL NEUROPATHY (H): ICD-10-CM

## 2021-06-04 VITALS
WEIGHT: 190 LBS | TEMPERATURE: 98.8 F | HEART RATE: 118 BPM | SYSTOLIC BLOOD PRESSURE: 134 MMHG | OXYGEN SATURATION: 98 % | DIASTOLIC BLOOD PRESSURE: 84 MMHG | BODY MASS INDEX: 34.75 KG/M2

## 2021-06-04 VITALS
OXYGEN SATURATION: 93 % | WEIGHT: 186.2 LBS | SYSTOLIC BLOOD PRESSURE: 152 MMHG | BODY MASS INDEX: 34.06 KG/M2 | TEMPERATURE: 99.3 F | DIASTOLIC BLOOD PRESSURE: 87 MMHG | HEART RATE: 99 BPM

## 2021-06-04 VITALS
BODY MASS INDEX: 34.02 KG/M2 | OXYGEN SATURATION: 98 % | WEIGHT: 186 LBS | TEMPERATURE: 98.5 F | DIASTOLIC BLOOD PRESSURE: 77 MMHG | HEART RATE: 97 BPM | SYSTOLIC BLOOD PRESSURE: 119 MMHG

## 2021-06-04 VITALS
TEMPERATURE: 98.2 F | BODY MASS INDEX: 34.75 KG/M2 | DIASTOLIC BLOOD PRESSURE: 73 MMHG | OXYGEN SATURATION: 98 % | WEIGHT: 190 LBS | HEART RATE: 98 BPM | SYSTOLIC BLOOD PRESSURE: 116 MMHG

## 2021-06-04 VITALS
HEIGHT: 62 IN | HEART RATE: 103 BPM | TEMPERATURE: 98.5 F | OXYGEN SATURATION: 94 % | WEIGHT: 183.4 LBS | DIASTOLIC BLOOD PRESSURE: 79 MMHG | SYSTOLIC BLOOD PRESSURE: 137 MMHG | BODY MASS INDEX: 33.75 KG/M2

## 2021-06-04 VITALS
HEART RATE: 110 BPM | SYSTOLIC BLOOD PRESSURE: 117 MMHG | DIASTOLIC BLOOD PRESSURE: 68 MMHG | TEMPERATURE: 99.1 F | OXYGEN SATURATION: 97 % | BODY MASS INDEX: 33.42 KG/M2 | WEIGHT: 182.7 LBS

## 2021-06-04 VITALS
TEMPERATURE: 98.8 F | WEIGHT: 187.4 LBS | SYSTOLIC BLOOD PRESSURE: 139 MMHG | HEART RATE: 102 BPM | BODY MASS INDEX: 34.28 KG/M2 | DIASTOLIC BLOOD PRESSURE: 84 MMHG | OXYGEN SATURATION: 98 %

## 2021-06-04 VITALS
HEART RATE: 122 BPM | WEIGHT: 181.3 LBS | SYSTOLIC BLOOD PRESSURE: 127 MMHG | BODY MASS INDEX: 33.16 KG/M2 | OXYGEN SATURATION: 96 % | DIASTOLIC BLOOD PRESSURE: 67 MMHG | TEMPERATURE: 98.9 F

## 2021-06-04 VITALS — WEIGHT: 183 LBS | BODY MASS INDEX: 34.55 KG/M2 | HEIGHT: 61 IN

## 2021-06-04 VITALS
TEMPERATURE: 100.9 F | WEIGHT: 185.5 LBS | DIASTOLIC BLOOD PRESSURE: 88 MMHG | SYSTOLIC BLOOD PRESSURE: 123 MMHG | BODY MASS INDEX: 34.14 KG/M2 | OXYGEN SATURATION: 97 % | HEIGHT: 62 IN | HEART RATE: 108 BPM

## 2021-06-04 VITALS
HEART RATE: 97 BPM | TEMPERATURE: 99.2 F | OXYGEN SATURATION: 96 % | SYSTOLIC BLOOD PRESSURE: 142 MMHG | DIASTOLIC BLOOD PRESSURE: 75 MMHG | BODY MASS INDEX: 33.87 KG/M2 | WEIGHT: 185.2 LBS

## 2021-06-04 VITALS
SYSTOLIC BLOOD PRESSURE: 123 MMHG | HEART RATE: 90 BPM | TEMPERATURE: 97.7 F | DIASTOLIC BLOOD PRESSURE: 74 MMHG | BODY MASS INDEX: 33.29 KG/M2 | WEIGHT: 182 LBS | OXYGEN SATURATION: 98 %

## 2021-06-04 VITALS
DIASTOLIC BLOOD PRESSURE: 69 MMHG | WEIGHT: 188.4 LBS | OXYGEN SATURATION: 98 % | HEART RATE: 111 BPM | SYSTOLIC BLOOD PRESSURE: 144 MMHG | BODY MASS INDEX: 34.46 KG/M2 | TEMPERATURE: 98.7 F

## 2021-06-04 VITALS — WEIGHT: 183 LBS | HEIGHT: 61 IN | BODY MASS INDEX: 34.55 KG/M2

## 2021-06-04 VITALS — HEIGHT: 61 IN | BODY MASS INDEX: 33.99 KG/M2 | WEIGHT: 180 LBS

## 2021-06-05 VITALS
DIASTOLIC BLOOD PRESSURE: 82 MMHG | HEART RATE: 112 BPM | WEIGHT: 191.1 LBS | BODY MASS INDEX: 34.95 KG/M2 | OXYGEN SATURATION: 98 % | TEMPERATURE: 98.8 F | SYSTOLIC BLOOD PRESSURE: 146 MMHG

## 2021-06-05 VITALS
OXYGEN SATURATION: 98 % | BODY MASS INDEX: 34.75 KG/M2 | SYSTOLIC BLOOD PRESSURE: 141 MMHG | DIASTOLIC BLOOD PRESSURE: 82 MMHG | TEMPERATURE: 98.6 F | WEIGHT: 190 LBS | HEART RATE: 109 BPM

## 2021-06-05 VITALS
SYSTOLIC BLOOD PRESSURE: 142 MMHG | DIASTOLIC BLOOD PRESSURE: 77 MMHG | BODY MASS INDEX: 35.56 KG/M2 | HEART RATE: 91 BPM | OXYGEN SATURATION: 98 % | TEMPERATURE: 98.3 F | WEIGHT: 194.4 LBS

## 2021-06-05 VITALS — WEIGHT: 192.8 LBS | BODY MASS INDEX: 35.26 KG/M2

## 2021-06-05 VITALS — WEIGHT: 189 LBS | BODY MASS INDEX: 34.78 KG/M2 | HEIGHT: 62 IN

## 2021-06-05 VITALS
HEART RATE: 95 BPM | WEIGHT: 193.5 LBS | DIASTOLIC BLOOD PRESSURE: 81 MMHG | SYSTOLIC BLOOD PRESSURE: 138 MMHG | TEMPERATURE: 98.1 F | OXYGEN SATURATION: 98 % | BODY MASS INDEX: 35.39 KG/M2

## 2021-06-05 VITALS
DIASTOLIC BLOOD PRESSURE: 94 MMHG | BODY MASS INDEX: 36.01 KG/M2 | TEMPERATURE: 98.3 F | SYSTOLIC BLOOD PRESSURE: 146 MMHG | OXYGEN SATURATION: 98 % | HEART RATE: 96 BPM | WEIGHT: 196.9 LBS

## 2021-06-05 VITALS
DIASTOLIC BLOOD PRESSURE: 85 MMHG | SYSTOLIC BLOOD PRESSURE: 143 MMHG | BODY MASS INDEX: 35.03 KG/M2 | WEIGHT: 191.5 LBS | OXYGEN SATURATION: 96 % | HEART RATE: 116 BPM | TEMPERATURE: 98.6 F

## 2021-06-05 VITALS
DIASTOLIC BLOOD PRESSURE: 84 MMHG | WEIGHT: 193.1 LBS | TEMPERATURE: 98.2 F | HEART RATE: 82 BPM | OXYGEN SATURATION: 99 % | SYSTOLIC BLOOD PRESSURE: 122 MMHG | BODY MASS INDEX: 35.32 KG/M2

## 2021-06-05 VITALS — WEIGHT: 191.5 LBS | BODY MASS INDEX: 35.03 KG/M2

## 2021-06-05 VITALS — WEIGHT: 192.3 LBS | BODY MASS INDEX: 35.17 KG/M2

## 2021-06-05 VITALS — WEIGHT: 193.7 LBS | BODY MASS INDEX: 35.43 KG/M2

## 2021-06-06 NOTE — TELEPHONE ENCOUNTER
Janeth called, wondered about PA for her hospital stay.  Told her to call Dr Hunter's office as they would be the ones working on that.  She asked about genetic testing.  Dr. Beaver notified, she doesn't feel patient would qualify due to no family history, her age and tumor biology.  Told her if she was interested in paying out of pocket, she could certainly Dr. Beaver that.  Told her to let her sisters know to update their primary mds and stay up to date on their mammograms.  Told her to tell her 31 year old daughter to update her primary as well.  Support provided, invited calls.

## 2021-06-06 NOTE — TELEPHONE ENCOUNTER
Called [facility] and spoke with Kimberly, Supportive staff member for Dr. Moreno. Dr. Moreno has a copy of the pathology report and plans to call [facility] this afternoon. Kimberly will call writer back after call is made so the report can be addended by the radiologist.   *2/14/2020-Called [facility] and confirmed that Dr. Moreno contacted [facility] with her pathology results on 2/13/2020. They have contacted  surgery center  directly to schedule patient for surgical consult, do not needs writer's assistance to coordinate.

## 2021-06-06 NOTE — PROGRESS NOTES
"This is a 54 y.o.  female who I'm asked to see by Ella Moreno MD for evaluation of a right breast cancer.  This was picked up by the patient.  She has noticed nipple inversion for about 6 to 8 months and then has noticed a palpable mass for about 2 months.  She underwent a mammogram and ultrasound.  A needle biopsy was done which shows an invasive ductal carcinoma.  It is estrogen receptor positive, progesterone receptor positive and HER-2 negative.    She has no family history of breast cancer.      PAST MEDICAL HISTORY:  Past Medical History:   Diagnosis Date     Inverted nipple     right - 6 mos ago     Past Surgical History:   Procedure Laterality Date     US BREAST CORE BIOPSY RIGHT Right 2/10/2020       Medications:    Current Outpatient Medications:      ALPRAZolam (XANAX) 0.25 MG tablet, Take 0.25-0.5 mg by mouth at bedtime., Disp: , Rfl:      escitalopram oxalate (LEXAPRO) 20 MG tablet, Take 20 mg by mouth daily., Disp: , Rfl:      rizatriptan (MAXALT-MLT) 10 MG disintegrating tablet, DISSOLVE 1 TABLET IN MOUTH AS NEEDED FOR MIGRAINE., Disp: , Rfl:      traZODone (DESYREL) 50 MG tablet, Take 50 mg by mouth at bedtime., Disp: , Rfl:      valACYclovir (VALTREX) 1000 MG tablet, TAKE 2 TABLETS BY MOUTH TWICE DAILY FOR ONE DAY WITH OUTBREAK, Disp: , Rfl:      zolpidem (AMBIEN) 10 mg tablet, Take 10 mg by mouth at bedtime., Disp: , Rfl:     Allergies:  Allergies   Allergen Reactions     Simvastatin Myalgia     Penicillin Itching and Swelling       Social History:  Social History     Tobacco Use     Smoking status: Never Smoker     Smokeless tobacco: Never Used   Substance Use Topics     Alcohol use: Not on file     Drug use: Not on file         ROS:  A 12 point comprehensive review of systems was negative except as noted.    Physical Exam  /80 (Patient Site: Left Arm, Patient Position: Sitting)   Pulse 89   Resp 16   Ht 5' 1\" (1.549 m)   Wt 183 lb (83 kg)   LMP 11/01/2014   SpO2 99%   BMI 34.58 " kg/m      General:alert, appears stated age and cooperative  Lungs:clear to auscultation bilaterally  CV:Regular with very soft systolic murmur  Breasts: Large palpable mass in the right breast with significant retraction of the right nipple.  Measures at least 5 cm in size.  Lymph Nodes:no axillary nodes palpated  Neuro:Grossly normal  Musculoskeletal: Normal range of motion of her upper extremities.  Skin: Normal skin turgor.  Psych: Very calm demeanor    Reviewed her mammograms and ultrasound and pathology.     Impression: Right Breast Cancer. Clinically T2-3, N0.  Discussed the surgical options for treatment of breast cancer which generally are a lumpectomy (partial mastectomy) combined with radiation versus a mastectomy.  Explained that the survival benefit is the same for both.  The difference is in local recurrence risk.  The patient is a Poor candidate for a lumpectomy.  At this point, I do not think she is a candidate at all.  The tumor is just simply too large and with the significant nipple retraction, I am not sure that I can get it out with a decent margin without removing the nipple.  She understands this.  It is not completely clear that she will even need chemotherapy so doing neoadjuvant to try to shrink it down is not really an option for her either.  I did go over the options of reconstruction.  She asked about doing the other side also.  Explained that it does not improve her prognosis but if she chooses to do it for symmetry, that is her choice.  Discussed SLN biopsy.  The procedure and rationale were explained.  Discussed that at this point we do not know yet whether or not she will need chemotherapy and we may not know until we get all of the results of surgery back.      Plan: We'll schedule for a bilateral  Mastectomies with sentinel lymph node biopsy on the right.  Have given her the names of several plastic surgeons and she is to contact them right away.  She will contact my  when  she knows who she is seeing so we can start to coordinate surgery.    This may be an overnight stay in the hospital.  The risks and benefits of surgery were explained.  Also talked about expected recovery time.

## 2021-06-06 NOTE — ANESTHESIA POSTPROCEDURE EVALUATION
Patient: Janeth Mathias  Procedure(s):  Bilateral mastectomies (Bilateral)  with right sentinel lymph node biopsy (Right)  BILATERAL BREAST RECONSTRUCTION WITH TISSUE EXPANDERS (Bilateral)  Anesthesia type: regional    Patient location: PACU  Last vitals:   Vitals Value Taken Time   BP 92/51 3/18/2020  1:40 PM   Temp 36.2  C (97.1  F) 3/18/2020 12:30 PM   Pulse 61 3/18/2020  1:40 PM   Resp 11 3/18/2020  1:40 PM   SpO2 94 % 3/18/2020  1:40 PM   Vitals shown include unvalidated device data.  Post vital signs: stable  Level of consciousness: awake and responds to simple questions  Post-anesthesia pain: pain controlled  Post-anesthesia nausea and vomiting: no  Pulmonary: unassisted, return to baseline  Cardiovascular: stable and blood pressure at baseline  Hydration: adequate  Anesthetic events: no    QCDR Measures:  ASA# 11 - Elinor-op Cardiac Arrest: ASA11B - Patient did NOT experience unanticipated cardiac arrest  ASA# 12 - Elinor-op Mortality Rate: ASA12B - Patient did NOT die  ASA# 13 - PACU Re-Intubation Rate: ASA13B - Patient did NOT require a new airway mgmt  ASA# 10 - Composite Anes Safety: ASA10A - No serious adverse event    Additional Notes:

## 2021-06-06 NOTE — ANESTHESIA PROCEDURE NOTES
Peripheral Block    Patient location during procedure: pre-op  Start time: 3/18/2020 9:57 AM  End time: 3/18/2020 10:07 AM  post-op analgesia per surgeon order as noted in medical record  Staffing:  Performing  Anesthesiologist: Argenis Orlando MD  Preanesthetic Checklist  Completed: patient identified, site marked, risks, benefits, and alternatives discussed, timeout performed, consent obtained, airway assessed, oxygen available, suction available, emergency drugs available and hand hygiene performed  Peripheral Block  Block type: otherAnesthesia block type: serratus.  Prep: ChloraPrep  Patient position: supine  Patient monitoring: cardiac monitor, continuous pulse oximetry, heart rate and blood pressure  Laterality: bilateral, same technique used bilaterally  Injection technique: ultrasound guided      US used to visualize anesthetic spread  Visualized anatomic structures normal  No Pathological Findings  Permanent ultrasound image captured for medical record  Sterile gel and probe cover used for ultrasound.  Needle  Needle type: Stimuplex   Needle gauge: 21 G  Needle length: 4 in  no peripheral nerve catheter placed  Assessment  Injection assessment: no difficulty with injection, negative aspiration for heme, no paresthesia on injection and incremental injection

## 2021-06-06 NOTE — ANESTHESIA PREPROCEDURE EVALUATION
Anesthesia Evaluation      Patient summary reviewed   No history of anesthetic complications     Airway   Mallampati: II  Neck ROM: full   Pulmonary - normal exam    breath sounds clear to auscultation  (-) sleep apnea, not a smoker    ROS comment: Exercise induced bronchospasm                         Cardiovascular - normal exam  Exercise tolerance: > or = 4 METS  (+) , hypercholesterolemia,     (-) angina  Rhythm: regular  Rate: normal,         Neuro/Psych    (+) neuromuscular disease,  anxiety/panic attacks,     Comments: Migraine  insomnia    Endo/Other    (+) arthritis, obesity,   (-) not pregnant     Comments: Breast CA    GI/Hepatic/Renal - negative ROS           Dental - normal exam                        Anesthesia Plan  Planned anesthetic: peripheral nerve block    ASA 3   Induction: intravenous   Anesthetic plan and risks discussed with: patient  Anesthesia plan special considerations: antiemetics,   Post-op plan: routine recovery

## 2021-06-06 NOTE — PROGRESS NOTES
"Janeth presents to [facility] today for a surgical consult with Dr. Beaver regarding her newly diagnosed right breast cancer.  She is accompanied by her  for consult.  RN assessment and EMR update.  /80 (Patient Site: Left Arm, Patient Position: Sitting)   Pulse 89   Resp 16   Ht 5' 1\" (1.549 m)   Wt 183 lb (83 kg)   LMP 11/01/2014   SpO2 99%   BMI 34.58 kg/m  .  Patient given RN and MD contact information as well as a packet of information on breast cancer and surgery.  She met with Dr. Beaver, see dictation for details of visit.  She plans a bilateral mastectomy with reconstruction. Reviewed steps toward moving forward with meeting the plastic surgeon and getting her surgery scheduled.  Support provided,invited calls.  RN time 20 mins  "

## 2021-06-06 NOTE — ANESTHESIA CARE TRANSFER NOTE
Last vitals:   Vitals:    03/18/20 1230   BP: 139/67   Pulse: 92   Resp: 12   Temp: 36.2  C (97.1  F)   SpO2: 100%     Patient's level of consciousness is drowsy  Spontaneous respirations: yes  Maintains airway independently: yes  Dentition unchanged: yes  Oropharynx: oropharynx clear of all foreign objects    QCDR Measures:  ASA# 20 - Surgical Safety Checklist: WHO surgical safety checklist completed prior to induction    PQRS# 430 - Adult PONV Prevention: 4558F - Pt received => 2 anti-emetic agents (different classes) preop & intraop  ASA# 8 - Peds PONV Prevention: 4558F - Pt received => 2 anti-emetic agents (different classes) preop & intraop  PQRS# 424 - Elinor-op Temp Management: 4559F - At least one body temp DOCUMENTED => 35.5C or 95.9F within required timeframe  PQRS# 426 - PACU Transfer Protocol: - Transfer of care checklist used  ASA# 14 - Acute Post-op Pain: ASA14B - Patient did NOT experience pain >= 7 out of 10

## 2021-06-07 NOTE — TELEPHONE ENCOUNTER
Received a records request from [facility] [place], requesting a H&P (needing reason for the oncotype dx order).   Successfully faxed Dr. Barrera consult from 4/7/2020 to [phone].  (14 pages)

## 2021-06-07 NOTE — PROGRESS NOTES
Patient is here for consultation of breast cancer. MA brought temperature elevation of 100.9.   Due to COVID-19, Rn went into room in full PPE. No other positives on screening questions. Dr. Barrera informed.   Shannen Haynes RN

## 2021-06-07 NOTE — TELEPHONE ENCOUNTER
Called Janeth to relay that she was approved for Sophie's oscar from [employer]. Instructed that she will be receiving a mailing from [employer] with a bill payment for for her to complete to direct how she would like to use her stipend. She is very appreciative of the assistance.  She had questions about her port and application of lidocaine for port access. Instructed to place plastic wrap over the site after she applies so it stays intact and does not rub off on her clothes. Inquired if she was given a port pillow after placement and she has not received. Relayed that writer will have a port pillow given to her on her first day of chemo. She would like writer to call her on Thursday to provide encouragement prior to her first day of chemo. Support and encouragement given today and will follow up again next week.

## 2021-06-07 NOTE — PROGRESS NOTES
"Janeth Mathias is a 54 y.o. female who is being evaluated via a billable video visit.      The patient has been notified of following:     \"This video visit will be conducted via a call between you and your physician/provider. We have found that certain health care needs can be provided without the need for an in-person physical exam.  This service lets us provide the care you need with a video conversation.  If a prescription is necessary we can send it directly to your pharmacy.  If lab work is needed we can place an order for that and you can then stop by our lab to have the test done at a later time.    Video visits are billed at different rates depending on your insurance coverage. Please reach out to your insurance provider with any questions.    If during the course of the call the physician/provider feels a video visit is not appropriate, you will not be charged for this service.\"    Patient has given verbal consent to a Video visit? Yes    Patient would like to receive their AVS by AVS Preference: Nelson.    Patient would like the video invitation sent by: Text to cell phone: [phone]    Will anyone else be joining your video visit?  Her  was with the patient by her side.        Video Start Time: 14.31    Additional provider notes:     Video-Visit Details    Type of service:  Video Visit  [facility] Hematology and Oncology Consult Note    Patient: Janeth Mathias  MRN: [id number]  Date of Service: 04/28/2020        Reason for Visit    Follow-up for right-sided breast cancer.    Assessment/Plan    Ms. Janeth Mathias is a 54 y.o. woman who noticed an inverted nipple on the right side in July 2019 and a lump in the right breast by November 2019.  The mammogram showed a mass within the right breast at the 12 o'clock position which on imaging was about 5.4 cm in size.  Core needle biopsy showed invasive ductal carcinoma, grade 2 that was strongly estrogen and progesterone receptor positive and HER-2 " negative.  She had a bilateral mastectomy and right-sided sentinel lymph node dissection with tissue expander placement done on 3/18/2020.  The posterior margin of the sample on the right side came back positive and so she had to have a reresection done on 3/30/2020.  At that time she had a Port-A-Cath also placed for chemotherapy.    Final pathology showed a 7.5 cm, grade 2, invasive ductal carcinoma, which also had some lymphovascular invasion.  1 of the 3 lymph nodes was positive for a 1.1 cm metastatic focus.  20% of the tumor was a solid type DCIS.  Final stage was pT3, pN1a, cM0.  Estrogen and progesterone receptor strongly positive and HER-2 negative.    Her past medical history includes: depression, anxiety, osteoarthritis and exercise-induced asthma but she appeared to be in good physical shape.    Cancer Staging  Malignant neoplasm of upper-inner quadrant of right breast in female, estrogen receptor positive (H)  Staging form: Breast, AJCC 8th Edition  - Pathologic stage from 4/28/2020: Stage IB (pT3, pN1a(sn), cM0, G2, ER+, UT+, HER2-, Oncotype DX score: 24) - Signed by Georgia Barrera MD on 4/28/2020      -----------------------------------------------------------------------------------    Today she was very happy with the plan for a video visit which was for her convenience.  She was accompanied by her  who was by her side in the video visit.    She says that the cellulitis of the breast has now completely subsided the surgical site has healed and she is feeling well.  She was just anxious to hear the results of the work-up that has been done so far.    I discussed with her that the MUGA scan of the heart that was done on 4/14/2020 showed a normal left radicular function and ejection fraction.    The Oncotype DX score has come back at 24.  I could show her the actual report on the video.  Based on the score itself she has a recurrence risk of approximately 19% in 9 years.  However when we put in  the size of the cancer and the grade, the recurrence risk increases to 31%.  If we add the fact that she had one lymph node that was positive, the recurrence risk increases even more.  That even though the absolute number of the Oncotype DX score is in the intermediate range, taking all her other clinical features into consideration, she has a high risk of recurrence of the breast cancer.  Thus I think we have to go for adjuvant chemotherapy.  She voiced understanding.    I explained to her that our overall plan of treatment will be to complete chemotherapy, then go for adjuvant radiation.  After the adjuvant radiation is completed then we will go to estrogen blockade therapy.  We will also continue with follow-up.  I explained the rationale to her.    We then discussed about the chemotherapy regimen that we should go for.  The main decision was whether we should go for dose dense AC x4 followed by 12 weeks of weekly Taxol or whether we should go for TC chemotherapy for 4 cycles.  We had a good discussion about the pros and cons.  Finally she decided that she would like to go for the more active chemotherapy regimen.  Thus we will go for AC chemotherapy in the dose dense fashion for 4 cycles followed by Taxol given once weekly for 12 weeks.  She asked whether she will be able to work during the chemotherapy.  I told her that we will need to wait and see how she feels.  If she feels that she is too fatigued to work she can let me know and I can give her a letter for her workplace.  She voiced understanding.    She feels that she would like to get started with chemotherapy by May 8.  She is hoping that if she receives chemotherapy on a Friday she will have time to recover over the weekend so she can go to work the next week.  I think there is an okay plan.  She feels that she is ready to start chemotherapy straightaway without another appointment in between for further indication.  We can go for chemotherapy education  with the nurses also on the first day.    I discussed the schedule for chemotherapy in detail with her.  I am sending all the take home medications for chemotherapy including dexamethasone, Compazine and Zofran to the pharmacy.  I am also sending a prescription for EMLA cream.  I asked her to fill the prescriptions and bring them along for the first cycle of chemotherapy.  I have asked for the after visit summary, the schedule for appointments, a copy of the Oncotype DX report and information for her about the AC chemotherapy and paclitaxel chemotherapy to be mailed out to her.  She said that she will be able to read all that and be better prepared before she starts the chemotherapy.    I discussed with Janeth Mathias about the risks of chemotherapy.  We discussed about the reduction in blood counts. Anemia can cause tiredness, a low platelet count can lead to bleeding problems. A blood transfusion may be needed for anemia or low platelet count. A low white blood count puts a person at risk for infections which may even be life threatening. If there is a fever of 100.5 F or above the Cancer clinic needs to be called immediately day or night as inpatient admission and antibiotics may be needed.    Chemotherapy may cause body aches. It may cause hair loss which can even be permanent. When hair grows back after chemo it may have a different color or texture.  Chemotherapy may cause problems with oral ulcers or pain on swallowing. It may cause nausea and vomiting but and we use antinausea medications to control the nausea.  Chemotherapy may cause problems for the heart with decreased function.  It may cause diarrhea or constipation.  Chemotherapy may cause toxicity to the liver or kidneys.  It may cause toxicity to the urinary bladder called hemorrhagic cystitis. Measures to reduce the risk were discussed.    Chemotherapy may cause neuropathy causing numbness and tingling in fingers and toes and unsteadiness on walking.  Chemotherapy doses may need to be modified for this problem.  Occasionally chemotherapy may cause problems for the brain. Some people experience problems with memory. Rarely there can be serious brain damage.    Occasionally chemotherapy can cause damage to the bone marrow after some time with reduced blood counts- called myelodysplasia. Rarely chemotherapy can cause a second cancer years later, like a leukemia.    Thus chemotherapy can have serious and even life threatening side effects. Some people can have side effects that are different from what we discussed as people can react differently to chemotherapy- what we have discussed is not an exhaustive list. However chemotherapy is being recommended as the expected benefits outweigh the risks.    She will sign the consent form for chemotherapy on the day if she starts chemotherapy when she has a provider visit.    Diagnosis:    1. Malignant neoplasm of upper-inner quadrant of right breast in female, estrogen receptor positive (H)     2. Encounter for antineoplastic chemotherapy           Plan:    1.  Chemotherapy orders are already.  She will be scheduled to start chemotherapy on 5/8/2020.  Appointment with MD or NP before chemotherapy and labs according to treatment plan before chemotherapy.  Chemotherapy education is also to be scheduled on that day.      Video End Time (time video stopped): 14.59  Originating Location (pt. Location): Home    Distant Location (provider location):  [facility] CANC[facility] CARE AND H[facility]ATOLOGY     Mode of Communication:  Video Conference via [employer]      Georgia Barrera MD      CC:  Ella Moreno MD Diane Ogren, MD

## 2021-06-07 NOTE — TELEPHONE ENCOUNTER
Called Janeth to follow up after her phone visit with Dr. Barrera yesterday. She is scheduled to start C1 chemo on 5/8. She is still trying to absorb that she will be receiving chemo.She has a supportive  and a good support network. She will try to continue working her scheduled three days per week but may reduce depending on her SE of chemo. Her  is still employed at this time. They do have additional bills at this time due to her diagnosis.  1. Financial- We discussed financial grants as a way to help alleviate some stress at this time. Writer will apply for Sophie's Oscar via portal on her behalf. Will reassess income level in the future and apply for general oscar if she qualifies at that time. Will monitor for reopening of [facility] in July when they anticipate reopening funds.   2. Emotional support- Provided information on [employer] and explained program. Will send our printed information with phone number for her to call to request a supportive sister. She thought that this would be a helpful resource for her. Relayed that we have a therapist on staff at , [facility] that is available for emotional support and to process diagnosis, feelings about treatment, body image, etc. She is appreciative of the resource and will defer scheduling an apt at this time.  3. Wigs/head coverings- Wig resource folder will be mailed out. Reviewed contents including [employer] catalog to browse for hats and wigs with coupon for wig included. List of [place] salons for wigs and hats listed by [facility] was reviewed. Look Good Feel Better handout to access services on line, and ACS booklet on hair loss and wigs and sun precautions.  She was appreciative of the above resources. Will follow up to inform her when oscar is approved. She has writer's contact information for future reference. Support and reasurance given. Calls invited.

## 2021-06-07 NOTE — ANESTHESIA POSTPROCEDURE EVALUATION
Patient: Janeth Mathias  Procedure(s):  Revision Right Mastectomy (Right)  Port Placement on the Left (Left)  REVISION RIGHT BREAST RECONSTRUCTION WITH PLACEMENT SUBMUSCULAR  TISSUE EXPANDER (Right)  Anesthesia type: general    Patient location: Phase II Recovery  Last vitals:   Vitals Value Taken Time   /78 3/30/2020 10:45 AM   Temp 36.4  C (97.6  F) 3/30/2020 10:10 AM   Pulse 78 3/30/2020 10:56 AM   Resp 16 3/30/2020 10:40 AM   SpO2 96 % 3/30/2020 10:56 AM   Vitals shown include unvalidated device data.  Post vital signs: stable  Level of consciousness: awake and responds to simple questions  Post-anesthesia pain: pain controlled  Post-anesthesia nausea and vomiting: no  Pulmonary: unassisted, return to baseline  Cardiovascular: stable and blood pressure at baseline  Hydration: adequate  Anesthetic events: no    QCDR Measures:  ASA# 11 - Elinor-op Cardiac Arrest: ASA11B - Patient did NOT experience unanticipated cardiac arrest  ASA# 12 - Elinor-op Mortality Rate: ASA12B - Patient did NOT die  ASA# 13 - PACU Re-Intubation Rate: ASA13B - Patient did NOT require a new airway mgmt  ASA# 10 - Composite Anes Safety: ASA10A - No serious adverse event    Additional Notes:

## 2021-06-07 NOTE — CONSULTS
[facility] Hematology and Oncology Consult Note    Patient: Janeth Mathias  MRN: [id number]  Date of Service: 04/07/2020        Reason for Visit    I was consulted by Dr. Beaver regarding right-sided breast cancer.    Assessment/Plan    Ms. Janeth Mathias is a 54 y.o. woman who noticed an inverted nipple on the right side in July 2019 and a lump in the right breast by November 2019.  The mammogram showed a mass within the right breast at the 12 o'clock position which on imaging was about 5.4 cm in size.  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  She had a bilateral mastectomy and right-sided sentinel lymph node dissection with tissue expander placement done on 3/18/2020.  The posterior margin of the sample on the right side came back positive and so she had to have a reresection done on 3/30/2020.  At that time she had a Port-A-Cath also placed for chemotherapy.    Final pathology showed a 7.5 cm, grade 2, invasive ductal carcinoma, which also had some lymphovascular invasion.  1 of the 3 lymph nodes was positive for a 1.1 cm metastatic focus.  20% of the tumor was a solid type DCIS.  Final stage was pT3, pN1a, cM0.  Estrogen and progesterone receptor strongly positive and HER-2 negative.    She does have a past history of depression, anxiety, osteoarthritis and exercise-induced asthma but she appears to be in good physical shape.    I have gone through her past medical records in detail including records from the [employer] system.  I have gone through her labs and pathology reports in detail.  I have reviewed the imaging studies.    1.  She came to the clinic today she had a temperature 100.9  F.  We did screen her for the possibility of coronavirus infection but she has absolutely no respiratory symptoms.  Also on physical examination she has warmth and tenderness and erythema on the right chest wall near the site of the  surgical scar.  This is close to where she has the drain also.  She appears to have a mild cellulitis of the right chest wall going on after the surgery.  I think that is the cause of the elevated temperature.  She is already on clindamycin.  I asked her to continue with that.  She can take Tylenol as needed for fever.  We are giving her a thermometer and asking her to check her temperature when she feels feverish or at least twice a day.  We are also letting the plastic surgery office know about this.  She does have an appointment there in 2 days time.    As mentioned above I do not think that she has coronavirus infection.  However I asked her to watch out for any respiratory symptoms.  If she develops a cough or shortness of breath she should let us know.  She voiced understanding.    2.  I then explained to her in detail the results of the pathology from the breast surgeries.  She did achieve a complete resection since the pathology from the resection did not show any residual cancer.  I explained to her that she had a somewhat large mass in the right breast at 7.5 cm.  One sentinel lymph node was also positive.  With the previous radiation of the staging manual, I think this would have qualified for stage III.  However with the eighth edition of the AJCC staging manual, this will come to only stage Ib, because she has a hormone receptor positive cancer.  I explained to her that hormone receptor positive cancers are likely to behave in a less aggressive fashion and we also have the option of treating her with antiestrogen therapy.  The question in her case is whether she should receive chemotherapy or not.  Conventionally she should.  That is why she has had the Port-A-Cath put in.  However I think we should get an Oncotype DX test done.  If the score comes quite low, then I think she may not need chemotherapy.  I think we will go for adjuvant chemotherapy if the score comes high or intermediate.  She was  agreeable to the plan.  I will ask for the testing to be done soon.    3.  I discussed with her that in any case we will plan for chemotherapy only after she has healed well after the surgery.  We will also need to make a decision about adjuvant radiation.  Strictly speaking she may not need adjuvant radiation with the pathology that she has but I think we should wait and see what the Oncotype DX shows before making a final decision about that.  I discussed with her that certainly she will need adjuvant endocrine therapy.  I outlined to her treatment with tamoxifen or an aromatase inhibitor.  I discussed with her that since she is postmenopausal, I would like to treat her with an aromatase inhibitor for 5 years since that is slightly more effective.  She voiced understanding.    4.  Today we will obtain a baseline CBC differential platelets and CMP in anticipation of chemotherapy.    5.  In anticipation of chemotherapy will also arrange for a MUGA scan to be done in the next 2 weeks.  Depending on that we can decide whether she can receive an anthracycline-containing regimen.    6.  Follow-up: I am asking for her to make a video visit appointment in about 3 weeks time.  I think by then she would have healed after the surgery and we will have the information from the above tests and then we can make a final decision about chemotherapy and other adjuvant treatments.      ECOG Performance   ECOG Performance Status: 1  Distress Assessment  Distress Assessment Score: 8        Problem List    1. Malignant neoplasm of upper-inner quadrant of right breast in female, estrogen receptor positive (H)  HM1(CBC and Differential)    Comprehensive Metabolic Panel    HM1 (CBC with Diff)    Oncotype Test    NM Muga    JIC RED   2. Encounter for antineoplastic chemotherapy  NM Muga    JIC RED   3. Cellulitis of chest wall  JIC RED           CC: Ella Moreno MD Diane Ogren, MD Cherrie Heinrich,  MD      ______________________________________________________________________________      Staging History    Cancer Staging  Malignant neoplasm of upper-inner quadrant of right breast in female, estrogen receptor positive (H)  Staging form: Breast, AJCC 8th Edition  - Pathologic: Stage IB (pT3, pN1a(sn), cM0, G2, ER+, MS+, HER2-) - Unsigned      History    Ms. Janeth Mathias is a 54-year-old woman who is here for the consultation alone.  Her  is also listening in on the encounter through the speaker phone.    She states that she noted an inverted nipple on the right side in July 2019.  She had had her annual mammogram in September 2018 which did not show anything suspicious.  She did an Internet search and was reassured that inverted nipples can be normal.  Later on by November she did notice a small lump in her right breast.  She then sought medical care and the mammogram showed a mass in the right breast.  An ultrasound guided core needle biopsy that was done on 2/10/2020 showed a hormone receptor positive breast cancer.  When she saw Dr. Beaver, the mass was noted to be large clinically and so it was decided to go for a bilateral mastectomy with right sentinel lymph node biopsy and tissue expander placement, which was done on 3/18/2020.  Pathology came back with the posterior margin being positive and so she had to go for a reresection on 3/30/2020.  At that time she had a Port-A-Cath also placed in case that she needs chemotherapy.    She says that after the surgery she has been feeling somewhat fatigued.  Apart from that she has not really felt anything new.  She still has some serosanguineous discharge in the drain from the right side.  She is expecting to have the drains removed in a couple of days time.  She does have some tenderness and tightness of the surgical site especially on the right side.    She herself has not felt any fever at home.  She does not have any respiratory symptoms at all.  She  does not even have a cough or a sore throat.  No shortness of breath.  No wheezing.    She has been able to do the regular work needed at home and has also been doing some remote work for her job after the surgery.    I asked her about her physical activity level.  She says that she goes to the gym every other day and does 20 to 30 minutes on the elliptical machine.  She has not needed any help to or that needs to be done at home or at work.  She walks a mile daily.    She does have a history of some depression and anxiety which is under fairly good control now.  Some history of chronic back pain and arthritis especially in the knees.    No night sweats.  No lumps or bumps elsewhere.  No unusual headaches.  No eyesight problems.  No mouth sores.  No nausea or vomiting.  No abdominal pain.  No constipation or diarrhea.  No blood in stool or black stools.  No difficulty with urination.  No skin rashes.  No numbness or tingling.    Please see below.  A 14 point review of system is otherwise completely negative.    Menstrual history: Menarche at the age of 17.  2 pregnancies.  First pregnancy at the age of 23.  LMP 2 years ago.  No history of hormone replacement therapy use.  No history of oral contraceptive pill use.    Past History  Past Medical History:   Diagnosis Date     Anxiety      Carpal tunnel syndrome      Depression      Exercise induced bronchospasm      Insomnia      Migraine      Osteoarthritis of knees, bilateral      Recurrent cold sores      Temporomandibular joint pain 03/1993    Uses a mouthguard.     Past Surgical History:   Procedure Laterality Date     ABDOMINOPLASTY  06/2019     CARPAL TUNNEL RELEASE Right 07/14/2008     COLONOSCOPY  11/01/2017    Repeat in 10 years.     NM SENTINEL NODE INJECTION  3/18/2020     PARTIAL KNEE ARTHROPLASTY Left 07/16/2018     LA BREAST RECONSTRUC W TISS EXPANDR Bilateral 3/18/2020    Procedure: BILATERAL BREAST RECONSTRUCTION WITH TISSUE EXPANDERS;  Surgeon:  Sunni Hunter MD;  Location: [facility] Main OR;  Service: Plastics     ND INSERT TISSUE EXPANDER(S) Right 3/30/2020    Procedure: REVISION RIGHT BREAST RECONSTRUCTION WITH PLACEMENT SUBMUSCULAR  TISSUE EXPANDER;  Surgeon: Sunni Hunter MD;  Location: [facility] OR;  Service: Plastics     ND MASTECTOMY, SIMPLE, COMPLETE Bilateral 3/18/2020    Procedure: Bilateral mastectomies;  Surgeon: Kayla Beaver MD;  Location: [facility] Main OR;  Service: General     ND MASTECTOMY, SIMPLE, COMPLETE Right 3/30/2020    Procedure: Revision Right Mastectomy;  Surgeon: Kayla Beaver MD;  Location: [facility] OR;  Service: General     REPLACEMENT UNICONDYLAR JOINT KNEE Right 08/14/2019     TONSILLECTOMY AND ADENOIDECTOMY  05/1994     TUBAL LIGATION  1993     TUNNELED VENOUS CATHETER PLACEMENT Left 3/30/2020    Procedure: Port Placement on the Left;  Surgeon: Kayla Beaver MD;  Location: [facility] OR;  Service: General     US BREAST CORE BIOPSY RIGHT Right 2/10/2020     Family History   Problem Relation Age of Onset     Hypertension Mother      Hyperlipidemia Mother      Osteoarthritis Mother      Peripheral vascular disease Mother      Hypertension Father      Hyperlipidemia Father      Migraines Father      Depression Father      Osteoarthritis Father      Bipolar disorder Sister      Schizophrenia Sister      No Medical Problems Daughter      No Medical Problems Maternal Grandmother      Brain cancer Maternal Grandfather 65     No Medical Problems Paternal Grandmother      No Medical Problems Paternal Grandfather      No Medical Problems Maternal Aunt      No Medical Problems Paternal Aunt      Hyperlipidemia Sister      Depression Half Sister      Personality disorder Half Sister      Depression Son      Social History     Socioeconomic History     Marital status:      Spouse name: None     Number of children: None     Years of education: None     Highest education level: None    Occupational History     Occupation: Manager     Employer: [employer]   Social Needs     Financial resource strain: None     Food insecurity     Worry: None     Inability: None     Transportation needs     Medical: None     Non-medical: None   Tobacco Use     Smoking status: Never Smoker     Smokeless tobacco: Never Used   Substance and Sexual Activity     Alcohol use: Yes     Comment: Very occasional.     Drug use: Never     Sexual activity: None   Lifestyle     Physical activity     Days per week: None     Minutes per session: None     Stress: None   Relationships     Social connections     Talks on phone: None     Gets together: None     Attends Church service: None     Active member of club or organization: None     Attends meetings of clubs or organizations: None     Relationship status: None     Intimate partner violence     Fear of current or ex partner: None     Emotionally abused: None     Physically abused: None     Forced sexual activity: None   Other Topics Concern     None   Social History Narrative     None     Allergies    Allergies   Allergen Reactions     Simvastatin Myalgia     Penicillin Hives and Itching     Itching swelling on arms and chest       Review of Systems    General  General (WDL): Exceptions to WDL  Fatigue: Yes - Recent (Less than 3 months)  Fever: None  Generalized Muscle Weakness: Yes - Recent (Less than 3 months)  Weight Loss: No  ENT  ENT (WDL): Exceptions to WDL  Vertigo (Dizziness): None  Glasses or Contacts: Yes - Chronic (Greater than 3 months)  Respiratory  Respiratory (WDL): Exceptions to WDL  Dyspnea: Yes - Chronic (Greater than 3 months)(exercise induced)  hemoptysis: None  Is patient on O2?: None  Cough: None  Cardiovascular  Cardiovascular (WDL): Exceptions to WDL  Palpitations: None  Edema Limbs: None  Irregular Heart Beat: None  Chest Pain: Yes - Recent (Less than 3 months)  Lightheadedness: Yes - Recent (Less than 3 months)  Endocrine  Endocrine (WDL):  Exceptions to WDL  Heat Intolerance: None  Excessive Thirst: None  Cold Intolerance: None  Excessive Urination: Yes - Chronic (Greater than 3 months)  Hotflashes: None  Gastrointestinal  Gastrointestinal (WDL): Exceptions to WDL  Difficulty Swallowing: None  Heartburn: None  Constipation: None  Yellowish skin and/or eyes: None  Blood from rectum: None  Nausea and Vomiting: None  Abdominal Pain: None  Diarrhea: None  Have had black or tan stools?: None  Hemorrhoids: Yes - Recent (Less than 3 months)  Musculoskeletal  Musculoskeletal (WDL): Exceptions to WDL  Range of Motion Limitation: Yes - Recent (Less than 3 months)  Joint pain: Yes - Recent (Less than 3 months)  Back Pain: Yes - Recent (Less than 3 months)  Activity Assistance: None  Difficulty to lie flat for more than 30 minutes: None  Pain interfering with walking: None  Muscle pain or stiffness: Yes - Recent (Less than 3 months)  Recent fall: None  Assistive device: None  Neurological  Neurological (WDL): Exceptions to WDL  History of LOC?: None  Headaches: None  Difficulty walking: None  Difficulty with speech: None  Difficulty with memory: None  Vertigo (Dizziness): None  Dominant Hand: Right  Seizures: None  Difficulty with Balance: None  Numbness and/or tingling: None  Psychological/Emotional  Psychological/Emotional (WDL): Exceptions to WDL  Depression: Yes - Chronic (Greater than 3 months)  Insomnia: Yes - Chronic (Greater than 3 months)  Panic attacks: None  Anxiety: Yes - Recent (Less than 3 months)  Daytime sleepiness: None  Hallucinations: None  Psychosocial needs or not coping well: None  Hematological/Lymphatic  Hematological/Lymphatic (WDL): All hematological/lymphatic elements are within defined limits  Dermatological  Dermatologic (WDL): All dermatological elements are within defined limits  Genitourinary/Reproductive  Genitourinary/Reproductive (WDL): All genitourinary/reproductive elements are within defined limits  Reproductive (Females  "only)  Menstrual irritation or increase in discharge: No  Age at start of periods: 17  Last menstural cycle: 2018  Number of pregnancies: 2  Age at first pregnancy: 23  Patient Pregnant?: No  Is the patient trying to get pregnant?: No  Is the patient on birth control: No  Pain  Currently in Pain: Yes  Pain Score (Initial OR Reassessment): 2  Pain Frequency: Constant/continuous  Location: bilat breasts  Pain Characteristics : Tightness  Pain Intervention(s): Home medication  Response to Interventions: helps    Physical Exam    Recent Vitals 4/7/2020   Height 5' 2\"   Weight 185 lbs 8 oz   BSA (m2) 1.92 m2   /88   Pulse 108   Temp 100.9   Temp src 1   SpO2 97   Some recent data might be hidden       GENERAL: Alert and oriented to time place and person. Seated comfortably. In no distress.  A bit heavyset.  Very pleasant.  Looks well overall.    HEAD: Atraumatic and normocephalic.    EYES: EMMA, EOMI.  No pallor.  No icterus.    Oral cavity: no mucosal lesion or tonsillar enlargement.    NECK: supple. JVP normal.  No thyroid enlargement.    LYMPH NODES: No palpable, cervical, axillary or inguinal lymphadenopathy.    CHEST: clear to auscultation bilaterally.  Resonant to percussion throughout bilaterally.  Symmetrical breath movements bilaterally.  Port-A-Cath site over the left upper chest looks unremarkable.    CVS: S1 and S2 are heard. Regular rate and rhythm.  No murmur or gallop or rub heard.  No peripheral edema.    ABDOMEN: Soft. Not tender. Not distended.  No palpable hepatomegaly or splenomegaly.  No other mass palpable.  Bowel sounds heard.    EXTREMITIES: Warm.    SKIN: no rash, or bruising or purpura.  Has a full head of hair.    BREAST AREAS:  She has had a bilateral mastectomy.  Left breast surgical scar is clean and dry.  Appears to be healing well.  Left axilla is without tenderness or mass.    On the right side there appears to be a mild soft tissue swelling, mild local rise of temperature and " redness.  No discharge from the surgical site.  She still has a surgical drain on the right side which is draining serosanguineous fluid.  Mild tenderness in the right axilla also where she has the scar of the sentinel lymph node biopsy.      Lab Results  Results for DONALD MULLER (MRN [id number]) as of 4/7/2020 14:13   Ref. Range 7/6/2018 16:41 8/9/2019 15:25   WBC Latest Ref Range: 4.0 - 11.0 thou/uL 6.6 6.1   RBC Latest Ref Range: 3.80 - 5.40 mill/uL 4.25 4.19   Hemoglobin Latest Ref Range: 12.0 - 16.0 g/dL 12.8 12.5   Hematocrit Latest Ref Range: 35.0 - 47.0 % 37.8 38.0   MCV Latest Ref Range: 80 - 100 fL 89 91   MCH Latest Ref Range: 27.0 - 34.0 pg 30.1 29.8   MCHC Latest Ref Range: 32.0 - 36.0 g/dL 33.9 32.9   RDW Latest Ref Range: 11.0 - 14.5 % 12.2 11.9   Platelets Latest Ref Range: 140 - 440 thou/uL 245 210   MPV Latest Ref Range: 8.5 - 12.5 fL 9.8 9.4   Results for DONALD MULLER (MRN [id number]) as of 4/7/2020 14:13   Ref. Range 8/9/2019 15:25 3/5/2020 09:34   Sodium Latest Ref Range: 136 - 145 mmol/L 140 140   Potassium Latest Ref Range: 3.5 - 5.0 mmol/L 3.9 3.9   Chloride Latest Ref Range: 98 - 107 mmol/L 103 105   CO2 Latest Ref Range: 22 - 31 mmol/L 27 27   Anion Gap, Calculation Latest Ref Range: 5 - 18 mmol/L 10 8   BUN Latest Ref Range: 8 - 22 mg/dL 17 17   Creatinine Latest Ref Range: 0.60 - 1.10 mg/dL 0.83 0.82   GFR MDRD Af Amer Latest Ref Range: >60 mL/min/1.73m2 >60 >60   GFR MDRD Non Af Amer Latest Ref Range: >60 mL/min/1.73m2 >60 >60   Calcium Latest Ref Range: 8.5 - 10.5 mg/dL 9.8 9.5   Glucose Latest Ref Range: 70 - 125 mg/dL 85 80     Pathology results from the breast biopsy dated 2/10/2020, the bilateral mastectomy dated 3/18/2020 and the resection date of 3/30/2020 reviewed.  Details updated in the staging form.    Imaging Results    Xr C Arm Less Than One Hour    Result Date: 3/30/2020  Please see Operative or Procedure Note for details on this exam or Radiology Report for  "body part of interest (if applicable).     Poc Us Guidance Needle Placement    Result Date: 3/30/2020  Ultrasound was performed as guidance to an anesthesia procedure.  Click \"PACS images\" hyperlink below to view any stored images.  For specific procedure details, view procedure note authored by anesthesia.    Poc Us Guidance Needle Placement    Result Date: 3/18/2020  Ultrasound was performed as guidance to an anesthesia procedure.  Click \"PACS images\" hyperlink below to view any stored images.  For specific procedure details, view procedure note authored by anesthesia.    Poc Us Guidance Needle Placement    Result Date: 3/18/2020  Ultrasound was performed as guidance to an anesthesia procedure.  Click \"PACS images\" hyperlink below to view any stored images.  For specific procedure details, view procedure note authored by anesthesia.    Poc Us Guided Vascular Access    Result Date: 3/18/2020  Ultrasound was performed as guidance to an anesthesia procedure.  Click \"PACS images\" hyperlink below to view any stored images.  For specific procedure details, view procedure note authored by anesthesia.    [place] Lymph Node Injection    Result Date: 3/18/2020  EXAM: NM SENTINEL NODE INJECTION LOCATION: [facility] DATE/TIME: 3/18/2020 10:00 AM INDICATION: Breast carcinoma. Preoperative identification of the right sentinel lymph node. PROCEDURE: Informed consent was obtained from the patient. The skin was prepped with Chlorhexidine. 512 microcuries of technetium-99m Lymphoseek was injected in an intradermal fashion 1 cm from the areolar margin. The patient tolerated this well. The procedure was performed by SCOTT Grace, at 10:00 AM.    EXAM: MAMMO DIAGNOSTIC 3D BILATERAL, US BREAST RIGHT LIMITED 1-3 QUADRANTS  LOCATION: [facility]  DATE/TIME: 2/6/2020 2:53 PM     INDICATION: 54-year-old female presents with a palpable right breast lump for approximately 2 months. Patient also reports new right " nipple inversion for 6-8 months. Patient denies any associated right nipple discharge.  COMPARISON: 9/28/2018, 11/20/2014, 2/28/2013.     MAMMOGRAPHIC FINDINGS: Bilateral full-field digital diagnostic mammograms performed. The breasts are heterogeneously dense, which may obscure small masses. Images evaluated with the assistance of CAD. Breast tomosynthesis was used in interpretation.      A BB marker was placed on the skin overlying the area of palpable concern within the right breast. There is an underlying 2.8 x 3.5 cm irregular mass with indistinct margins with associated architectural distortion. Recommend targeted right breast   ultrasound for further evaluation. New right nipple inversion is also demonstrated.     There are no suspicious masses, calcifications, or architectural distortion within the left breast.     ULTRASOUND FINDINGS: Patient-directed physical examination of the palpable area of concern within the right breast demonstrates an approximately 4 cm irregular, firm, fixed mass. The right nipple is inverted. There is no associated overlying skin   thickening or skin changes.     Sonographic evaluation of the right breast was performed by both the technologist and the radiologist.     Targeted right breast ultrasound at the area of palpable concern at the 12:00 position, 2-4 cm from the nipple demonstrates an irregular, hypoechoic mass with ill-defined margins with associated posterior acoustic shadowing. There is no increased   internal vascularity demonstrated on color examination. This mass is difficult to measure in its entirety secondary to its ill-defined margins but measures approximately 5.4 x 1.8 x 4.6 cm.     Sonographic evaluation of the right axilla was performed. A few nonenlarged, morphologically normal lymph nodes with retained fatty arvind and thin cortices are demonstrated.     IMPRESSION:   1.  Suspicious mass within the right breast at the 12:00 position correlates with the  palpable area of concern. This ill-defined mass measures up to 5.4 cm sonographically. Recommend ultrasound-guided biopsy of this mass.  2.  New right nipple inversion without associated skin thickening or overlying skin changes.  3.  No enlarged or morphologically abnormal right axillary lymph nodes.     ACR BI-RADS Category 5: Highly Suggestive of Malignancy.    Signed by: Georgia Barrera MD

## 2021-06-07 NOTE — ANESTHESIA PREPROCEDURE EVALUATION
Anesthesia Evaluation      Patient summary reviewed   No history of anesthetic complications     Airway   Mallampati: II   Pulmonary - normal exam   (+) asthma (exercise induced)  mild,  well controlled,                          Cardiovascular - normal exam  Exercise tolerance: > or = 4 METS  (+) , hypercholesterolemia,     Rhythm: regular  Rate: normal,         Neuro/Psych    (+) depression, anxiety/panic attacks,     Comments: migraines    Endo/Other    (+) obesity,      GI/Hepatic/Renal - negative ROS      Other findings: Zk=647  K=3.8  Hgb=13.8      Dental - normal exam                        Anesthesia Plan  Planned anesthetic: general endotracheal  GAETT  Antiemetics  Scopolamine  Ketamine after induction  Consider background propofol  Soft bite block  Surgeon to inject local on field - serratus block not recommended in revision with implants in place  ASA 2   Induction: intravenous   Anesthetic plan and risks discussed with: patient    Post-op plan: routine recovery

## 2021-06-07 NOTE — ANESTHESIA CARE TRANSFER NOTE
Last vitals:   Vitals:    03/30/20 0920   BP: 145/75   Pulse: 85   Resp: 14   Temp: 36.2  C (97.1  F)   SpO2: 100%     Patient's level of consciousness is drowsy  Spontaneous respirations: yes  Maintains airway independently: yes  Dentition unchanged: yes  Oropharynx: oropharynx clear of all foreign objects    QCDR Measures:  ASA# 20 - Surgical Safety Checklist: WHO surgical safety checklist completed prior to induction    PQRS# 430 - Adult PONV Prevention: 4558F - Pt received => 2 anti-emetic agents (different classes) preop & intraop  ASA# 8 - Peds PONV Prevention: NA - Not pediatric patient, not GA or 2 or more risk factors NOT present  PQRS# 424 - Elinor-op Temp Management: 4559F - At least one body temp DOCUMENTED => 35.5C or 95.9F within required timeframe  PQRS# 426 - PACU Transfer Protocol: - Transfer of care checklist used  ASA# 14 - Acute Post-op Pain: ASA14B - Patient did NOT experience pain >= 7 out of 10

## 2021-06-07 NOTE — TELEPHONE ENCOUNTER
Called Janeth to follow up after her medical oncology consult today. She reports that her consult went well and feels that she absorbed most of the information.. She understands that the oncotype test has beenn ordered and will result in a couple of weeks. She is hopeful that she will not need chemo but will move forward if it is indicated. She was surprised that she had an elevated temp today at clinic and now has thermometer to help monitor her temp and will continue to her antibx. She has follow up in a couple of days with Dr. Hunter. She has follow up scheduled on 4/28 with Dr. Barrera and will talk further about treatment planning at that time. She has writer's contact information in the event of questions or concerns prior to that date. Will follow up in  the future.

## 2021-06-07 NOTE — TELEPHONE ENCOUNTER
Called Janeth to follow up and schedule new medical oncology consult as ordered by Dr. Beaver. Appointment with Dr. Barrera was scheduled on Tuesday, 4/7/2020 check in 12:30 p.m.at .  New patient letter/map with appointment details, health history form to complete, medication form  and person-to person communication form were all sent to patient via email. Of note, all of her medical records are in [facility], PCP is at  [facility].  She had a port placed on 3/30 during re-excision surgery. She understands that she will not be able to have anyone accompany her inside of  but her  or loved one can be part of consult via phone. Nurse navigator role was introduced and letter was attacheded with further explanation. Janeth has writer's contact information for future correspondence. She is aware that writer is working remotely during the pandemic and was encouraged to leave message on voicemail with needs and writer will call her back.  We plan to talk after is at clinic for medical oncology consult.

## 2021-06-08 NOTE — PROGRESS NOTES
Pt amb into clinic for first chemo infusion. Reviewed plan of care. Pt premedicated. Adriamycin given without incident, blood return checked throughout. Pt tolerated Cytoxan infusion well. Port flushed and de-accessed. Reviewed dc instructions, including when and whom to call. Pt amb out of clinic.

## 2021-06-08 NOTE — PROGRESS NOTES
Patient is here for follow-up of breast cancer. Due D1C1 DD AC +T pending labs/OV with Dr. Barrera.  Shannen Haynes RN

## 2021-06-08 NOTE — TELEPHONE ENCOUNTER
"Called Janeth a few days after her first cycle of chemo to follow up. She relayed that she was nervous for her first chemo but felt very comfortable with staff and infusion went smoothly. She was \"wired\" for the first couple days after chemo and was not able to sleep well. She now understands that the dexamethasone can elicit this side effect. She is sleeping better now and started to feel very fatigued on Sunday and it is slowly improving. She has worked partial days this week as she is an  and there were some nataliia that were due. After the next cycle she will be better able to take full days off as needed and her employer is supportive of her needs.. She has taken her anit-emetics and has managed her nausea well, no emesis.We discussed future hair loss and at this time she is not struggling with anticipating losing her hair. She has triage number on her refrigerator in case she needs to call for sx management in the future. She has an apt scheduled with Sada Welch, therapist, next week.We had discussed her as a resource in the past and she looks forward to the additional emotional support. Also shared about the emerging virtual support groups and educational classes offered by [employer] that are free of charge. She is interested in exploring this resource. Link to [email or web address] will be sent to her confirmed e-mail address. Support and encouragement given. She has writer's contact information and calls invited if questions, concerns or support is needed. Will follow up in the future.  "

## 2021-06-08 NOTE — PROGRESS NOTES
Patient is here for labs, provider, and treatment for Malignant neoplasm of upper-inner quadrant of right breast in female, estrogen receptor positive.

## 2021-06-08 NOTE — PROGRESS NOTES
[facility] Hematology and Oncology Progress Note    Patient: Janeth Mathias  MRN: [id number]  Date of Service: 05/08/2020        Reason for Visit    Follow-up of right-sided breast cancer.    Assessment and Plan  Cancer Staging  Malignant neoplasm of upper-inner quadrant of right breast in female, estrogen receptor positive (H)  Staging form: Breast, AJCC 8th Edition  - Pathologic stage from 4/28/2020: Stage IB (pT3, pN1a(sn), cM0, G2, ER+, OR+, HER2-, Oncotype DX score: 24) - Signed by Georgia Barrera MD on 4/28/2020      ECOG Performance   ECOG Performance Status: 0    Distress Assessment  Distress Assessment Score: 9: Concerns about her son and conflict with him.  I am referring her to the oncology psychotherapist.    Pain   : No pain.    Ms. Janeth Mathias is a 54 y.o. woman who noticed an inverted nipple on the right side in July 2019 and a lump in the right breast by November 2019.  The mammogram showed a mass within the right breast at the 12 o'clock position which on imaging was about 5.4 cm in size.  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  She had a bilateral mastectomy and right-sided sentinel lymph node dissection with tissue expander placement done on 3/18/2020.  The posterior margin of the sample on the right side came back positive and so she had to have a reresection done on 3/30/2020.  At that time she had a Port-A-Cath also placed for chemotherapy.     Final pathology showed a 7.5 cm, grade 2, invasive ductal carcinoma, which also had some lymphovascular invasion.  1 of the 3 lymph nodes was positive for a 1.1 cm metastatic focus.  20% of the tumor was a solid type DCIS.  Final stage was pT3, pN1a, cM0.  Estrogen and progesterone receptor strongly positive and HER-2 negative.    The Oncotype DX score came back at 24.  Even though by the score alone this comes in the intermediate range, when we add in  the clinical features, especially the size of the cancer and the lymph node positivity the recurrence risk is significantly high.  Thus we decided to go for adjuvant chemotherapy followed by a radiation oncology consultation and then adjuvant estrogen blockade therapy.    1.  We already discussed about chemotherapy with dose dense AC for 4 cycles followed by Taxol given once weekly for 12 weeks.  She has gone through the printed material that was mailed out to her.  She feels ready to proceed with chemotherapy.  Today I went through the schedule for chemotherapy once again with her.  I discussed with her that after 2 cycles of dose dense AC chemotherapy I want to obtain a MUGA scan to make sure that she does not have any left ventricular ejection fraction problems from the chemotherapy.  She voiced understanding.    We went through the side effects of chemotherapy 1 more time.  I emphasized to her about using the antinausea medications early.  She plans to shave of her hair once it starts falling down.  We again discussed particularly about the need to watch out for infections.  Thus if she has a fever she has to call us immediately.  After going through the other side effects also, she has signed the consent form for chemotherapy.    2.  Labs from today were reviewed.  Blood counts are quite stable and her metabolic panel is also quite stable.  We will start cycle #1 of chemotherapy today with dose dense AC.    3.  She received Neulasta through the on body injector tomorrow.  I discussed with her about the possibility of bony aches from the Neulasta.  I recommended that she should take Claritin 10 mg tablets daily for about a week after each chemotherapy session to reduce the pain.  She can also use Tylenol for pain as needed.  She voiced understanding.    4.  I reviewed with her how to take the antinausea medications and dexamethasone 1 more time.    5.  She is having some stress about getting started with  chemotherapy.  I discussed with her that when she goes through 1 cycle she will feel that she is under much better control and that stress should come down.  She is having some additional problems within her family dealing with her son.  This is causing the most distress for her.  I am referring her to the oncology psychotherapist.  I am hopeful that some studies can be found to help her deal with her family issues.    6.  We decided that after she completes chemotherapy then we can consider the plan for a radiation oncology consultation.  If she needs radiation, we will start estrogen deprivation therapy after that.  She voiced understanding.    7.  Follow-up: Return to clinic with MD or NP in 2 weeks.  Labs according to treatment plan and appointment for cycle #2 of dose dense AC chemotherapy.    Time spend >25 minutes face to face with the patient. More than 50 % in counseling and coordination of care.      Problem List    1. Malignant neoplasm of upper-inner quadrant of right breast in female, estrogen receptor positive (H)  CC OFFICE VISIT LONG    Ambulatory referral to Oncology Psychotherapist    CC OFFICE VISIT LONG    Infusion Appointment    loratadine (CLARITIN) 10 mg tablet   2. Encounter for antineoplastic chemotherapy  CC OFFICE VISIT LONG    CC OFFICE VISIT LONG    Infusion Appointment    loratadine (CLARITIN) 10 mg tablet        CC: Ella Moreno MD Diane Ogren, MD    ______________________________________________________________________________    History of Present Illness    Ms. Janeth Mathias is here for follow-up alone.    She states that overall she is doing well from the physical point of view but she is feeling quite stressed out.  She is anxious about getting started with chemotherapy.  Yesterday she also had an episode of conflict with her son who has some addiction issues.  That is what is upsetting her the most.    She says that she has had a chance to read through all the material  that I mailed out to her about chemotherapy and she feels prepared for the chemotherapy.  However she feels she is a bit anxious because she is starting on something that is new for her.    No fevers or night sweats.  No lumps or bumps anywhere.  No aches or pains.  The surgical scars are healed well.  Eating well.  No nausea or vomiting.  No abdominal pain.  No constipation or diarrhea.  Breathing is fine.  No chest pain or cough.  No difficulty with urination.  No skin rashes.  No numbness or tingling.    Please see below.  A 14 point review of system is otherwise completely negative.    Pain Status  Currently in Pain: No/denies    Review of Systems    Constitutional  Constitutional (WDL): Exceptions to WDL  Fatigue: Fatigue relieved by rest  Fever: None(99.2)  Chills: None  Weight Gain: None  Weight Loss: None  Neurosensory  Neurosensory (WDL): Exceptions to WDL  Peripheral Motor Neuropathy: None  Ataxia: None  Peripheral Sensory Neuropathy: Asymptomatic, loss of deep tendon reflexes or paresthesia(Rt upper, under arm)  Confusion: None  Syncope: None  Cardiovascular  Cardiovascular (WDL): All cardiovascular elements are within defined limits  Pulmonary  Respiratory (WDL): Exceptions to WDL(hx exercise induced asthma)  Cough: None  Dyspnea: None  Hypoxia: None  Gastrointestinal  Gastrointestinal (WDL): Exceptions to WDL  Anorexia: None  Constipation: None  Diarrhea: None  Dysphagia: None  Esophagitis: None  Nausea: None  Pharyngitis: None  Vomiting: None  Dysgeusia: None  Dry Mouth: None  Genitourinary  Genitourinary (WDL): All genitourinary elements are within defined limits  Integumentary  Integumentary (WDL): All integumentary elements are within defined limits  Patient Coping     Distress Assessment  Distress Assessment Score: 9  Accompanied by       Past History  Past Medical History:   Diagnosis Date     Anxiety      Carpal tunnel syndrome      Depression      Exercise induced bronchospasm      Insomnia       Migraine      Osteoarthritis of knees, bilateral      Recurrent cold sores      Temporomandibular joint pain 03/1993    Uses a mouthguard.         Past Surgical History:   Procedure Laterality Date     ABDOMINOPLASTY  06/2019     CARPAL TUNNEL RELEASE Right 07/14/2008     COLONOSCOPY  11/01/2017    Repeat in 10 years.     NM SENTINEL NODE INJECTION  3/18/2020     PARTIAL KNEE ARTHROPLASTY Left 07/16/2018     MI BREAST RECONSTRUC W TISS EXPANDR Bilateral 3/18/2020    Procedure: BILATERAL BREAST RECONSTRUCTION WITH TISSUE EXPANDERS;  Surgeon: Sunni Hunter MD;  Location: [facility] OR;  Service: Plastics     MI INSERT TISSUE EXPANDER(S) Right 3/30/2020    Procedure: REVISION RIGHT BREAST RECONSTRUCTION WITH PLACEMENT SUBMUSCULAR  TISSUE EXPANDER;  Surgeon: Sunni Hunter MD;  Location: [facility] OR;  Service: Plastics     MI MASTECTOMY, SIMPLE, COMPLETE Bilateral 3/18/2020    Procedure: Bilateral mastectomies;  Surgeon: Kayla Beaver MD;  Location: [facility] OR;  Service: General     MI MASTECTOMY, SIMPLE, COMPLETE Right 3/30/2020    Procedure: Revision Right Mastectomy;  Surgeon: Kayla Beaver MD;  Location: [facility] OR;  Service: General     REPLACEMENT UNICONDYLAR JOINT KNEE Right 08/14/2019     TONSILLECTOMY AND ADENOIDECTOMY  05/1994     TUBAL LIGATION  1993     TUNNELED VENOUS CATHETER PLACEMENT Left 3/30/2020    Procedure: Port Placement on the Left;  Surgeon: Kayla Beaver MD;  Location: [facility];  Service: General     US BREAST CORE BIOPSY RIGHT Right 2/10/2020       Physical Exam    Recent Vitals 5/8/2020   Height -   Weight 185 lbs 3 oz   BSA (m2) 1.92 m2   /75   Pulse 97   Temp 99.2   Temp src 1   SpO2 96   Some recent data might be hidden       GENERAL: Alert and oriented to time place and person. Seated comfortably. In no distress.  Heavyset.  Looks well overall.  A bit anxious.    HEAD: Atraumatic and normocephalic.    EYES: EMMA, EOMI.  No  pallor.  No icterus.    Oral cavity: no mucosal lesion or tonsillar enlargement.    NECK: supple. JVP normal.  No thyroid enlargement.    LYMPH NODES: No palpable, cervical, axillary or inguinal lymphadenopathy.    CHEST: clear to auscultation bilaterally.  Resonant to percussion throughout bilaterally.  Symmetrical breath movements bilaterally.  The Port-A-Cath over the left upper chest looks unremarkable.    CVS: S1 and S2 are heard. Regular rate and rhythm.  No murmur or gallop or rub heard.  No peripheral edema.    ABDOMEN: Soft. Not tender. Not distended.  No palpable hepatomegaly or splenomegaly.  No other mass palpable.  Bowel sounds heard.    EXTREMITIES: Warm.    SKIN: no rash, or bruising or purpura.  Has a full head of hair.      Lab Results    Recent Results (from the past 168 hour(s))   Comprehensive Metabolic Panel   Result Value Ref Range    Sodium 140 136 - 145 mmol/L    Potassium 3.8 3.5 - 5.0 mmol/L    Chloride 107 98 - 107 mmol/L    CO2 24 22 - 31 mmol/L    Anion Gap, Calculation 9 5 - 18 mmol/L    Glucose 98 70 - 125 mg/dL    BUN 14 8 - 22 mg/dL    Creatinine 0.79 0.60 - 1.10 mg/dL    GFR MDRD Af Amer >60 >60 mL/min/1.73m2    GFR MDRD Non Af Amer >60 >60 mL/min/1.73m2    Bilirubin, Total 0.4 0.0 - 1.0 mg/dL    Calcium 9.1 8.5 - 10.5 mg/dL    Protein, Total 6.9 6.0 - 8.0 g/dL    Albumin 3.9 3.5 - 5.0 g/dL    Alkaline Phosphatase 68 45 - 120 U/L    AST 18 0 - 40 U/L    ALT 20 0 - 45 U/L   HM1 (CBC with Diff)   Result Value Ref Range    WBC 4.3 4.0 - 11.0 thou/uL    RBC 4.20 3.80 - 5.40 mill/uL    Hemoglobin 12.4 12.0 - 16.0 g/dL    Hematocrit 38.4 35.0 - 47.0 %    MCV 91 80 - 100 fL    MCH 29.5 27.0 - 34.0 pg    MCHC 32.3 32.0 - 36.0 g/dL    RDW 12.2 11.0 - 14.5 %    Platelets 220 140 - 440 thou/uL    MPV 9.2 8.5 - 12.5 fL    Neutrophils % 48 (L) 50 - 70 %    Lymphocytes % 36 20 - 40 %    Monocytes % 11 (H) 2 - 10 %    Eosinophils % 4 0 - 6 %    Basophils % 1 0 - 2 %    Neutrophils Absolute 2.1 2.0  - 7.7 thou/uL    Lymphocytes Absolute 1.6 0.8 - 4.4 thou/uL    Monocytes Absolute 0.5 0.0 - 0.9 thou/uL    Eosinophils Absolute 0.2 0.0 - 0.4 thou/uL    Basophils Absolute 0.0 0.0 - 0.2 thou/uL       Imaging    Nm Muga    Result Date: 4/15/2020    Normal study.   The left ventricular ejection fraction is 66%.          Signed by: Georgia Barrera MD

## 2021-06-08 NOTE — PROGRESS NOTES
[facility] Hematology and Oncology Progress Note    Patient: Janeth Mathias  MRN: [id number]  Date of Service: 05/22/2020        Reason for Visit    Follow-up of right-sided breast cancer.    Assessment and Plan  Cancer Staging  Malignant neoplasm of upper-inner quadrant of right breast in female, estrogen receptor positive (H)  Staging form: Breast, AJCC 8th Edition  - Pathologic stage from 4/28/2020: Stage IB (pT3, pN1a(sn), cM0, G2, ER+, TX+, HER2-, Oncotype DX score: 24) - Signed by Georgia Barrera MD on 4/28/2020      ECOG Performance   ECOG Performance Status: 1    Distress Assessment  Distress Assessment Score: 9: Please see the discussion below.    Pain   : No pain.    Ms. Janeth Mathias is a 54 y.o. woman who noticed an inverted nipple on the right side in July 2019 and a lump in the right breast by November 2019.  The mammogram showed a mass within the right breast at the 12 o'clock position which on imaging was about 5.4 cm in size.  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  She had a bilateral mastectomy and right-sided sentinel lymph node dissection with tissue expander placement done on 3/18/2020.  The posterior margin of the sample on the right side came back positive and so she had to have a reresection done on 3/30/2020.  At that time she had a Port-A-Cath also placed for chemotherapy.     Final pathology showed a 7.5 cm, grade 2, invasive ductal carcinoma, which also had some lymphovascular invasion.  1 of the 3 lymph nodes was positive for a 1.1 cm metastatic focus.  20% of the tumor was a solid type DCIS.  Final stage was pT3, pN1a, cM0.  Estrogen and progesterone receptor strongly positive and HER-2 negative.    The Oncotype DX score came back at 24.  Even though by the score alone this comes in the intermediate range, when we add in the clinical features, especially the size of the cancer and the  lymph node positivity the recurrence risk is significantly high.  Thus we decided to go for adjuvant chemotherapy followed by a radiation oncology consultation and then adjuvant estrogen blockade therapy.  We decided on chemotherapy with dose dense AC for 4 cycles followed by Taxol given once weekly for 12 weeks.  Chemotherapy was started on 5/8/2020.    1.  She is here for cycle #2 of chemotherapy.  We reviewed her cycle #1 went.  Overall I think she has a tolerated the first cycle of chemotherapy fairly well even though she had some expected side effects: Nausea which is well controlled, fatigue and some difficulty sleeping.  She feels ready to proceed to cycle #2 of chemotherapy.  Labs from today were reviewed.  Hemoglobin has come down somewhat to 10.9 but WBC count is normal and platelet count is normal.  Metabolic panel is quite stable.  We will proceed to cycle #2 of chemotherapy with the same doses.    2.  At the end of the cycle will obtain a MUGA scan to make sure that her left ventricular ejection fraction remains stable.  This is ordered and I have asked for her to be scheduled.    3.  I encouraged her to use the antinausea medications as needed for the nausea.    4.  We discussed about the fatigue.  I told her that even on the days when she feels very tired it is a good idea for her to get out and sit on the deck.  Once she starts feeling better she can start going for walks etc.  I discussed with her that she will feel in much better control now onwards because each day she will know how she felt in the previous cycle.  She voiced understanding.  Encouraged her to remain as active as she can.    5.  We discussed about her insomnia.  She already has zolpidem for sleep.  She asked about additional diazepam and I discouraged her from doing that.  She asked about using medical marijuana.  I told her that if she wants to do that I would not  her way but I am not in favor of using medical marijuana.   I think that its effects are marginal.  I am not a prescriber.  She voiced understanding.    I discussed with her that she can expect some insomnia due to dexamethasone in the first 2 days that is unavoidable.  She should try to have a calming routine in the evenings.  She can use the zolpidem as needed.  I think the anxiety level will come down from this cycle onwards because that she is going to be feeling in much better control and things will be better.    6.  Follow-up: Return to clinic with MD or NP in 2 weeks.  Labs according to treatment plan and appointment for cycle #3 of chemotherapy.  The MUGA scan is to be scheduled a day or 2 before that appointment.    Time spend >25 minutes face to face with the patient. More than 50 % in counseling and coordination of care.      Problem List    1. Malignant neoplasm of upper-inner quadrant of right breast in female, estrogen receptor positive (H)  CC OFFICE VISIT LONG    CC OFFICE VISIT LONG    Infusion Appointment    NM [facility]   2. Encounter for antineoplastic chemotherapy  CC OFFICE VISIT LONG    CC OFFICE VISIT LONG    Infusion Appointment    NM [facility]        CC: Ella Moreno MD        ______________________________________________________________________________    History of Present Illness    Ms. Janeth Mathias is here for follow-up alone.    She is here for cycle #2 of chemotherapy.  We discussed about how the first cycle went.  She states that she did feel some nausea in the initial days but the antinausea medications worked well and she did not throw up.  She has been eating well.  She has not lost any weight.    She started feeling the fatigue after the first 2 days.  This continued for about a week.  For a couple of days she did not get out of her house.  After that she has been almost back to normal activity in week 2.    She did not have much in the way of body aches.    She says that she did find it difficult to sleep in the first week after  chemotherapy.  Likely due to steroid effect in the first few days and then some anxiety.    In the first couple of days she also had some constipation but found that Senokot was quite helpful.    No fevers or night sweats.  No lumps or bumps anywhere.  No new aches or pains.  No unusual headaches.  No eyesight problems.  No mouth sores.  No swallowing difficulty.  No nausea or vomiting now.  Breathing is fine.  No chest pain or cough.  No abdominal pain.  No constipation now.  No diarrhea.  No blood in stool or black stools.  No difficulty with urination.  No skin rashes.  No numbness or tingling.    Please see below.  A 14 point review of system is otherwise completely negative.      Pain Status  Currently in Pain: No/denies    Review of Systems    Constitutional  Constitutional (WDL): Exceptions to WDL  Fatigue: Concerns(post 5 days treatment)  Neurosensory  Neurosensory (WDL): Exceptions to WDL  Dizziness: Concerns(position changes)  Eye   Eye Disorder (WDL): Exceptions to WDL  Blurred Vision: Concerns  Ear  Ear Disorder (WDL): All ear disorder elements are within defined limits  Cardiovascular  Cardiovascular (WDL): All cardiovascular elements are within defined limits  Pulmonary  Respiratory (WDL): Within Defined Limits  Gastrointestinal  Gastrointestinal (WDL): Exceptions to WDL  Nausea: Concerns(2-3 days post treatment; controlled with medications)  Constipation: Concerns(controlled with stool softners)  Genitourinary  Genitourinary (WDL): All genitourinary elements are within defined limits  Lymphatic  Lymph (WDL): All lymph disorder elements are within defined limits  Musculoskeletal and Connective Tissue  Musculoskeletal and Connetive Tissue Disorders (WDL): Exceptions to WDL  Arthralgia: Concerns(bilateral knees)  Integumentary  Integumentary (WDL): All integumentary elements are within defined limits  Patient Coping  Patient Coping: Accepting;Open/discussion  Accompanied by  Accompanied by: Alone  Oral  Chemo Adherence         Past History  Past Medical History:   Diagnosis Date     Anxiety      Carpal tunnel syndrome      Depression      Exercise induced bronchospasm      Insomnia      Migraine      Osteoarthritis of knees, bilateral      Recurrent cold sores      Temporomandibular joint pain 03/1993    Uses a mouthguard.         Past Surgical History:   Procedure Laterality Date     ABDOMINOPLASTY  06/2019     CARPAL TUNNEL RELEASE Right 07/14/2008     COLONOSCOPY  11/01/2017    Repeat in 10 years.     NM SENTINEL NODE INJECTION  3/18/2020     PARTIAL KNEE ARTHROPLASTY Left 07/16/2018     MD BREAST RECONSTRUC W TISS EXPANDR Bilateral 3/18/2020    Procedure: BILATERAL BREAST RECONSTRUCTION WITH TISSUE EXPANDERS;  Surgeon: Sunni Hunter MD;  Location: [facility] OR;  Service: Plastics     MD INSERT TISSUE EXPANDER(S) Right 3/30/2020    Procedure: REVISION RIGHT BREAST RECONSTRUCTION WITH PLACEMENT SUBMUSCULAR  TISSUE EXPANDER;  Surgeon: Sunni Hunter MD;  Location: [facility] OR;  Service: Plastics     MD MASTECTOMY, SIMPLE, COMPLETE Bilateral 3/18/2020    Procedure: Bilateral mastectomies;  Surgeon: Kayla Beaver MD;  Location: [facility] OR;  Service: General     MD MASTECTOMY, SIMPLE, COMPLETE Right 3/30/2020    Procedure: Revision Right Mastectomy;  Surgeon: Kayla Beaver MD;  Location: [facility] OR;  Service: General     REPLACEMENT UNICONDYLAR JOINT KNEE Right 08/14/2019     TONSILLECTOMY AND ADENOIDECTOMY  05/1994     TUBAL LIGATION  1993     TUNNELED VENOUS CATHETER PLACEMENT Left 3/30/2020    Procedure: Port Placement on the Left;  Surgeon: Kayla Beaver MD;  Location: [facility];  Service: General     US BREAST CORE BIOPSY RIGHT Right 2/10/2020       Physical Exam    Recent Vitals 5/22/2020   Height -   Weight 186 lbs 3 oz   BSA (m2) 1.92 m2   /87   Pulse 99   Temp 99.3   Temp src 1   SpO2 93   Some recent data might be hidden       GENERAL: Alert  and oriented to time place and person. Seated comfortably. In no distress.  She looks well overall.    HEAD: Atraumatic and normocephalic.    EYES: EMMA, EOMI.  No pallor.  No icterus.    Oral cavity: no mucosal lesion or tonsillar enlargement.    NECK: supple. JVP normal.  No thyroid enlargement.    LYMPH NODES: No palpable, cervical, axillary or inguinal lymphadenopathy.    CHEST: clear to auscultation bilaterally.  Resonant to percussion throughout bilaterally.  Symmetrical breath movements bilaterally.  The Port-A-Cath over the left upper chest looks unremarkable.    CVS: S1 and S2 are heard. Regular rate and rhythm.  No murmur or gallop or rub heard.  No peripheral edema.    ABDOMEN: Soft. Not tender. Not distended.  No palpable hepatomegaly or splenomegaly.  No other mass palpable.  Bowel sounds heard.    EXTREMITIES: Warm.    SKIN: no rash, or bruising or purpura.  Has a full head of hair.        Lab Results    Recent Results (from the past 168 hour(s))   Comprehensive Metabolic Panel   Result Value Ref Range    Sodium 141 136 - 145 mmol/L    Potassium 4.0 3.5 - 5.0 mmol/L    Chloride 109 (H) 98 - 107 mmol/L    CO2 26 22 - 31 mmol/L    Anion Gap, Calculation 6 5 - 18 mmol/L    Glucose 84 70 - 125 mg/dL    BUN 11 8 - 22 mg/dL    Creatinine 0.74 0.60 - 1.10 mg/dL    GFR MDRD Af Amer >60 >60 mL/min/1.73m2    GFR MDRD Non Af Amer >60 >60 mL/min/1.73m2    Bilirubin, Total 0.1 0.0 - 1.0 mg/dL    Calcium 9.0 8.5 - 10.5 mg/dL    Protein, Total 6.2 6.0 - 8.0 g/dL    Albumin 3.6 3.5 - 5.0 g/dL    Alkaline Phosphatase 59 45 - 120 U/L    AST 17 0 - 40 U/L    ALT 22 0 - 45 U/L   HM1 (CBC with Diff)   Result Value Ref Range    WBC 4.8 4.0 - 11.0 thou/uL    RBC 3.70 (L) 3.80 - 5.40 mill/uL    Hemoglobin 10.9 (L) 12.0 - 16.0 g/dL    Hematocrit 33.7 (L) 35.0 - 47.0 %    MCV 91 80 - 100 fL    MCH 29.5 27.0 - 34.0 pg    MCHC 32.3 32.0 - 36.0 g/dL    RDW 12.1 11.0 - 14.5 %    Platelets 252 140 - 440 thou/uL    MPV 8.8 8.5 -  12.5 fL       Imaging    No results found.      Signed by: Georgia Barrera MD

## 2021-06-08 NOTE — PROGRESS NOTES
Pt here for cycle 2 AC after seeing MD. No problems with treatment as directed. Positive blood return throughout. neulasta injector applied and pt aware of care. Upon completion port flushed and deaccessed. Pt d/c ambulatory to lobby alone.

## 2021-06-08 NOTE — PROGRESS NOTES
[facility] Hematology and Oncology Progress Note    Patient: Janeth Mathais  MRN: [id number]  Date of Service: 06/18/2020        Reason for Visit    1. Right breast cancer, adjuvant chemo    ECOG Performance   ECOG Performance Status: 1    Distress Assessment  Distress Assessment Score: 2    Pain  0    Assessment/Plan  1. Resected right breast cancer (ER/SC+), on adjuvant chemo  Tolerated cycle 3 dose-dense AC well. Minimal fatigue but staying active.    Labwork is adequate, with mild cumulative anemia. Will monitor on treatment.    MUGA after two cycles is stable (65% EF) compared to baseline. Will repeat after this cycle.    Will proceed with cycle 4 at same doses today.    Reassess in two weeks, ahead of transition to weekly Taxol. Reviewed potential side effects of Taxol and the regimen scheduling.    2.  Insomnia  Improved. Continues on Ambien.    3.  Rectal tear  Slowly improving with topical cream and keeping BMs soft/regular with Senna. No signs of infection.    4.  Chemo-induced stomatitis  Mild/intermittent. Continue baking soda rinses. Magic Mouthwash Rx was too expensive out of pocket to fill, but she is managing without.    ______________________________________________________________________________    History of Present Illness/ Interval History    Ms. Janeth Mathias  is a 54 y.o. on DD AC, due for cycle 4.  No nausea. Good appetite.  Fatigue minimal, stays active. Insomnia is better which is helpful.  No neuropathy. No fevers.   Developed a small rectal tear after constipation earlier in chemo course. She is on a topical cream and this is slowly improving. Keeping bowels soft and regular with Senna.  Mild intermittent mouth sores. Doing baking soda rinses. Magic Mouthwash was not covered by insurance so she decided against starting it and feels is manageable.    Review of Systems  Constitutional  Constitutional (WDL): Exceptions to WDL  Fatigue: Concerns(insomnia improving)  Neurosensory  Neurosensory  (WDL): All neurosensory elements are within defined limits  Eye   Eye Disorder (WDL): Exceptions to WDL  Blurred Vision: Concerns  Ear  Ear Disorder (WDL): All ear disorder elements are within defined limits  Cardiovascular  Cardiovascular (WDL): All cardiovascular elements are within defined limits  Pulmonary  Respiratory (WDL): Within Defined Limits  Gastrointestinal  Gastrointestinal (WDL): Exceptions to WDL  Esophagitis: Concerns  Constipation: Concerns(Rectum tearing due to constipation)  Genitourinary  Genitourinary (WDL): All genitourinary elements are within defined limits  Lymphatic  Lymph (WDL): All lymph disorder elements are within defined limits  Musculoskeletal and Connective Tissue  Musculoskeletal and Connetive Tissue Disorders (WDL): All Musculoskeletal and Connetive Tissue Disorder elements are within defined limits  Integumentary  Integumentary (WDL): All integumentary elements are within defined limits  Patient Coping  Patient Coping: Accepting;Open/discussion  Accompanied by  Accompanied by: Alone       Oncology History/Treatment  Diagnosis/Stage:   November, 2019: Right breast cancer (T3-pI7n-M0),   ER/WI+; HER2 neg.  -Presented with 4 month history right nipple inversion and more recent palpable right breast mass  -Mammogram: 5.4 cm right breast mass (12 o'clock)  -Core biopsy: invasive ductal carcinoma (ER/WI strongly positive, HER-2 negative)  -Final surgical pathology: 7.5 cm grade 2 invasive ductal ca with lymphovascular invasion. 1/3 nodes positive 1.1 cm met focus. 20% tumor was solid-type DCIS. ER/WI strongly positive, HER-2 negative.  -Oncotype DX score 24 (intermediate)  -Baseline MUGA 66%    Treatment:  -3/18/2020: Bilateral mastectomy and right sentinel node dissection with tissue expanders. Surgical posterior margin positive.  -3/30/2020: Re-resection for positive margin  -5/8/2020: Initiated adjuvant DD AC (4 cycles, followed by DD Taxol weekly x 12 planned)      Past  "History  Past Medical History:   Diagnosis Date     Anxiety      Carpal tunnel syndrome      Depression      Exercise induced bronchospasm      Insomnia      Migraine      Osteoarthritis of knees, bilateral      Recurrent cold sores      Temporomandibular joint pain 03/1993    Uses a mouthguard.         Past Surgical History:   Procedure Laterality Date     ABDOMINOPLASTY  06/2019     CARPAL TUNNEL RELEASE Right 07/14/2008     COLONOSCOPY  11/01/2017    Repeat in 10 years.     NM SENTINEL NODE INJECTION  3/18/2020     PARTIAL KNEE ARTHROPLASTY Left 07/16/2018     IN BREAST RECONSTRUC W TISS EXPANDR Bilateral 3/18/2020    Procedure: BILATERAL BREAST RECONSTRUCTION WITH TISSUE EXPANDERS;  Surgeon: Sunni Hunter MD;  Location: [facility];  Service: Plastics     IN INSERT TISSUE EXPANDER(S) Right 3/30/2020    Procedure: REVISION RIGHT BREAST RECONSTRUCTION WITH PLACEMENT SUBMUSCULAR  TISSUE EXPANDER;  Surgeon: Sunni Hunter MD;  Location: [facility];  Service: Plastics     IN MASTECTOMY, SIMPLE, COMPLETE Bilateral 3/18/2020    Procedure: Bilateral mastectomies;  Surgeon: Kayla Beaver MD;  Location: [facility] OR;  Service: General     IN MASTECTOMY, SIMPLE, COMPLETE Right 3/30/2020    Procedure: Revision Right Mastectomy;  Surgeon: Kayla Beaver MD;  Location: [facility] OR;  Service: General     REPLACEMENT UNICONDYLAR JOINT KNEE Right 08/14/2019     TONSILLECTOMY AND ADENOIDECTOMY  05/1994     TUBAL LIGATION  1993     TUNNELED VENOUS CATHETER PLACEMENT Left 3/30/2020    Procedure: Port Placement on the Left;  Surgeon: Kayla Beaver MD;  Location: [facility];  Service: General     US BREAST CORE BIOPSY RIGHT Right 2/10/2020       Physical Exam    Recent Vitals 6/18/2020   Height 5' 2\"   Weight 183 lbs 6 oz   BSA (m2) 1.91 m2   /79   Pulse 103   Temp 98.5   Temp src 1   SpO2 94   Some recent data might be hidden       GENERAL: Alert and oriented to time " place and person. Seated comfortably. In no distress.  HEAD: Atraumatic and normocephalic. Alopecia.  EYES: EMMA, EOMI. No erythema. No icterus.  ORAL CAVITY: Moist. No mucosal lesion or tonsillar enlargement.  NECK: supple. No thyroid enlargement.  LYMPH NODES: No palpable supraclavicular, cervical, axillary lymphadenopathy.  CHEST: clear to auscultation bilaterally. Resonant to percussion throughout bilaterally. Symmetrical breath movements bilaterally.  CVS: S1 and S2 are heard. Regular rate and rhythm. No murmur or gallop or rub heard.  ABDOMEN: Soft. Not tender. Not distended. No palpable hepatomegaly or splenomegaly. No other mass palpable. Bowel sounds present.  EXTREMITIES: Warm. No peripheral edema.  SKIN: no rash, or bruising or purpura.   NEURO: No gross deficit noted. Non-antalgic gait.      Lab Results    Recent Results (from the past 168 hour(s))   Comprehensive Metabolic Panel   Result Value Ref Range    Sodium 139 136 - 145 mmol/L    Potassium 3.8 3.5 - 5.0 mmol/L    Chloride 106 98 - 107 mmol/L    CO2 25 22 - 31 mmol/L    Anion Gap, Calculation 8 5 - 18 mmol/L    Glucose 80 70 - 125 mg/dL    BUN 11 8 - 22 mg/dL    Creatinine 0.75 0.60 - 1.10 mg/dL    GFR MDRD Af Amer >60 >60 mL/min/1.73m2    GFR MDRD Non Af Amer >60 >60 mL/min/1.73m2    Bilirubin, Total 0.2 0.0 - 1.0 mg/dL    Calcium 8.7 8.5 - 10.5 mg/dL    Protein, Total 6.2 6.0 - 8.0 g/dL    Albumin 3.5 3.5 - 5.0 g/dL    Alkaline Phosphatase 78 45 - 120 U/L    AST 13 0 - 40 U/L    ALT 17 0 - 45 U/L   HM1 (CBC with Diff)   Result Value Ref Range    WBC 9.7 4.0 - 11.0 thou/uL    RBC 3.16 (L) 3.80 - 5.40 mill/uL    Hemoglobin 9.6 (L) 12.0 - 16.0 g/dL    Hematocrit 29.4 (L) 35.0 - 47.0 %    MCV 93 80 - 100 fL    MCH 30.4 27.0 - 34.0 pg    MCHC 32.7 32.0 - 36.0 g/dL    RDW 14.7 (H) 11.0 - 14.5 %    Platelets 215 140 - 440 thou/uL    MPV 9.3 8.5 - 12.5 fL       Imaging    N[facility]    Result Date: 6/3/2020    1.The left ventricular ejection fraction is  65%.   2.Normal study.   3.The measured left ventricular ejection fraction on the prior study date of 4/14/2020 was 66%, no change between studies.        Billing  Total face to face time 15 minutes, with 10 minutes of that dedicated to counseling, education or coordination of care.     Signed by: Antonella Paredes

## 2021-06-08 NOTE — TELEPHONE ENCOUNTER
Called Janeth prior to first chemo scheduled on 5/8. She is nervous about her infusion. Validated her concerns and offered reassurance that infusion nurse will provide good education about her chemo and relayed that we have very skilled staff in chemo/nfusion. She will plan to bring along her phone with  to play games and headphones to listen to music.Will have IC's gather port pillow/hat/prayer shawl for her for her first day. Will follow up in the future.

## 2021-06-08 NOTE — PROGRESS NOTES
[facility] Hematology and Oncology Progress Note    Patient: Janeth Mathias  MRN: [id number]  Date of Service: 06/04/2020        Reason for Visit    1. Right breast cancer, adjuvant chemo    ECOG Performance   ECOG Performance Status: 1    Distress Assessment  Distress Assessment Score: 7    Pain  0    Assessment/Plan  1. Resected right breast cancer (ER/WV+), on adjuvant chemo  Tolerated cycle 2 dose-dense AC well, actually better than cycle 1. She had more energy.    Labwork is adequate, with mild anemia (10.4) and thrombocytopenia (139). Will monitor on treatment.    MUGA after two cycles is stable (65% EF) compared to baseline. Will repeat after cycle 4 AC.    Will proceed with cycle 3 at same doses.  Reassess in two weeks, ahead of cycle 4.    2.  Insomnia  Using Ambien chronically. Exacerbated on days of dexamethasone (days 2-4). I discussed option of decreasing the steroid dose to 4 mg daily since she is otherwise tolerating chemo well, but she does not want to run the risk of increasing chemo toxicity so will continue on the 8 mg daily dose.    We are not recommending use of additional anxiolytic nor medical marijuana. Encouraged increasing exercise/activity during day and introducing relaxation technique in evening/bedtime.    3.  Hemorrhoids  In addition to Preparation H and continued Epsom salt baths, she will try Witch Hazel wipes. Recommend she use a squirt bottle to clean rectal area after bowel movement and pat dry (over wiping with toilet paper). Recommend she increase her stool softener to two times a day to loosen her stools a bit more until healing.    4.  Chemo-induced stomatitis  Continue baking soda rinses. Magic Mouthwash Rx sent to pharmacy.    ______________________________________________________________________________    History of Present Illness/ Interval History    Ms. Janeth Mathias  is a 54 y.o. on DD AC, due for cycle 3.  Well-controlled nausea - very rare.  Fatigue better with cycle  2, was more active. Insomnia exacerbated on the three days she is on dexamethasone. Taking in morning.   No neuropathy. No fevers.   Good appetite, minimal weight loss she associates to eating healthier on chemo.  Has had painful hemorrhoids x 1.5 weeks, following prior constipation. Treating with Prep H. Taking one stool softener/day with formed/soft stool daily.  Mouth sores and irritated throat last week, resolved. Doing baking soda rinses.    Review of Systems  Constitutional  Constitutional (WDL): Exceptions to WDL  Fatigue: Concerns(harder to sleep with steriod)  Neurosensory  Neurosensory (WDL): All neurosensory elements are within defined limits  Eye   Eye Disorder (WDL): Exceptions to WDL  Blurred Vision: Concerns  Ear  Ear Disorder (WDL): Exceptions to WDL(pain and plugged in right ear; intermittently)  Cardiovascular  Cardiovascular (WDL): All cardiovascular elements are within defined limits  Pulmonary  Respiratory (WDL): Within Defined Limits  Gastrointestinal  Gastrointestinal (WDL): Exceptions to WDL(hemorrhoids; using Prep H)  Mucositis Oral: Concerns(using baking soda rinses)  Genitourinary  Genitourinary (WDL): All genitourinary elements are within defined limits  Lymphatic  Lymph (WDL): All lymph disorder elements are within defined limits  Musculoskeletal and Connective Tissue  Musculoskeletal and Connetive Tissue Disorders (WDL): All Musculoskeletal and Connetive Tissue Disorder elements are within defined limits  Integumentary  Integumentary (WDL): All integumentary elements are within defined limits  Patient Coping  Patient Coping: Accepting;Open/discussion  Accompanied by  Accompanied by: Alone       Oncology History/Treatment  Diagnosis/Stage:   November, 2019: Right breast cancer (T3-[id number]-S6),   ER/MS+; HER2 neg.  -Presented with 4 month history right nipple inversion and more recent palpable right breast mass  -Mammogram: 5.4 cm right breast mass (12 o'clock)  -Core biopsy: invasive ductal  carcinoma (ER/LA strongly positive, HER-2 negative)  -Final surgical pathology: 7.5 cm grade 2 invasive ductal ca with lymphovascular invasion. 1/3 nodes positive 1.1 cm met focus. 20% tumor was solid-type DCIS. ER/LA strongly positive, HER-2 negative.  -Oncotype DX score 24 (intermediate)  -Baseline MUGA 66%    Treatment:  -3/18/2020: Bilateral mastectomy and right sentinel node dissection with tissue expanders. Surgical posterior margin positive.  -3/30/2020: Re-resection for positive margin  -5/8/2020: Initiated adjuvant DD AC (4 cycles, followed by DD Taxol weekly x 12 planned)      Past History  Past Medical History:   Diagnosis Date     Anxiety      Carpal tunnel syndrome      Depression      Exercise induced bronchospasm      Insomnia      Migraine      Osteoarthritis of knees, bilateral      Recurrent cold sores      Temporomandibular joint pain 03/1993    Uses a mouthguard.         Past Surgical History:   Procedure Laterality Date     ABDOMINOPLASTY  06/2019     CARPAL TUNNEL RELEASE Right 07/14/2008     COLONOSCOPY  11/01/2017    Repeat in 10 years.     NM SENTINEL NODE INJECTION  3/18/2020     PARTIAL KNEE ARTHROPLASTY Left 07/16/2018     LA BREAST RECONSTRUC W TISS EXPANDR Bilateral 3/18/2020    Procedure: BILATERAL BREAST RECONSTRUCTION WITH TISSUE EXPANDERS;  Surgeon: Sunni Hunter MD;  [place] [facility] [place]  Service: [place]astics     LA INSERT TISSUE EXPANDER(S) Right 3/30/2020    Procedure: REVISION RIGHT BREAST RECONSTRUCTION WITH PLACEMENT SUBMUSCULAR  TISSUE EXPANDER;  Surgeon: Sunni Hunter MD;  Location: [facility] OR;  Service: Plastics     LA MASTECTOMY, SIMPLE, COMPLETE Bilateral 3/18/2020    Procedure: Bilateral mastectomies;  Surgeon: Kayla Beaver MD;  Location: [facility] OR;  Service: General     LA MASTECTOMY, SIMPLE, COMPLETE Right 3/30/2020    Procedure: Revision Right Mastectomy;  Surgeon: Kayla Beaver MD;  Location: [facility] OR;  Service:  General     REPLACEMENT UNICONDYLAR JOINT KNEE Right 08/14/2019     TONSILLECTOMY AND ADENOIDECTOMY  05/1994     TUBAL LIGATION  1993     TUNNELED VENOUS CATHETER PLACEMENT Left 3/30/2020    Procedure: Port Placement on the Left;  Surgeon: Kayla Beaver MD;  Location: [facility];  Service: General     US BREAST CORE BIOPSY RIGHT Right 2/10/2020       Physical Exam    Recent Vitals 6/4/2020   Height -   Weight 182 lbs 11 oz   BSA (m2) 1.9 m2   /68   Pulse 110   Temp 99.1   Temp src 1   SpO2 97   Some recent data might be hidden       GENERAL: Alert and oriented to time place and person. Seated comfortably. In no distress.  HEAD: Atraumatic and normocephalic. Alopecia.  EYES: EMMA, EOMI. No erythema. No icterus.  ORAL CAVITY: Moist. No mucosal lesion or tonsillar enlargement.  NECK: supple. No thyroid enlargement.  LYMPH NODES: No palpable supraclavicular, cervical, axillary lymphadenopathy.  CHEST: clear to auscultation bilaterally. Resonant to percussion throughout bilaterally. Symmetrical breath movements bilaterally.  CVS: S1 and S2 are heard. Regular rate and rhythm. No murmur or gallop or rub heard.  ABDOMEN: Soft. Not tender. Not distended. No palpable hepatomegaly or splenomegaly. No other mass palpable. Bowel sounds present.  EXTREMITIES: Warm. No peripheral edema.  SKIN: no rash, or bruising or purpura.   NEURO: No gross deficit noted. Non-antalgic gait.      Lab Results    Recent Results (from the past 168 hour(s))   NM Muga   Result Value Ref Range    MUGA EF 65 %    MUGA PRIOR EF 66 %   Comprehensive Metabolic Panel   Result Value Ref Range    Sodium 140 136 - 145 mmol/L    Potassium 3.9 3.5 - 5.0 mmol/L    Chloride 106 98 - 107 mmol/L    CO2 27 22 - 31 mmol/L    Anion Gap, Calculation 7 5 - 18 mmol/L    Glucose 105 70 - 125 mg/dL    BUN 12 8 - 22 mg/dL    Creatinine 0.79 0.60 - 1.10 mg/dL    GFR MDRD Af Amer >60 >60 mL/min/1.73m2    GFR MDRD Non Af Amer >60 >60 mL/min/1.73m2     Bilirubin, Total 0.2 0.0 - 1.0 mg/dL    Calcium 9.1 8.5 - 10.5 mg/dL    Protein, Total 6.2 6.0 - 8.0 g/dL    Albumin 3.6 3.5 - 5.0 g/dL    Alkaline Phosphatase 72 45 - 120 U/L    AST 13 0 - 40 U/L    ALT 17 0 - 45 U/L   HM1 (CBC with Diff)   Result Value Ref Range    WBC 8.6 4.0 - 11.0 thou/uL    RBC 3.50 (L) 3.80 - 5.40 mill/uL    Hemoglobin 10.4 (L) 12.0 - 16.0 g/dL    Hematocrit 32.2 (L) 35.0 - 47.0 %    MCV 92 80 - 100 fL    MCH 29.7 27.0 - 34.0 pg    MCHC 32.3 32.0 - 36.0 g/dL    RDW 13.0 11.0 - 14.5 %    Platelets 139 (L) 140 - 440 thou/uL    MPV 8.7 8.5 - 12.5 fL       Imaging    Nm Muga    Result Date: 6/3/2020    1.The left ventricular ejection fraction is 65%.   2.Normal study.   3.The measured left ventricular ejection fraction on the prior study date of 4/14/2020 was 66%, no change between studies.        Billing  Total face to face time 20 minutes, with 15 minutes of that dedicated to counseling, education or coordination of care.     Signed by: Antonella Paredes

## 2021-06-08 NOTE — PATIENT INSTRUCTIONS - HE
Recent Results (from the past 24 hour(s))   Comprehensive Metabolic Panel    Collection Time: 05/22/20  8:58 AM   Result Value Ref Range    Sodium 141 136 - 145 mmol/L    Potassium 4.0 3.5 - 5.0 mmol/L    Chloride 109 (H) 98 - 107 mmol/L    CO2 26 22 - 31 mmol/L    Anion Gap, Calculation 6 5 - 18 mmol/L    Glucose 84 70 - 125 mg/dL    BUN 11 8 - 22 mg/dL    Creatinine 0.74 0.60 - 1.10 mg/dL    GFR MDRD Af Amer >60 >60 mL/min/1.73m2    GFR MDRD Non Af Amer >60 >60 mL/min/1.73m2    Bilirubin, Total 0.1 0.0 - 1.0 mg/dL    Calcium 9.0 8.5 - 10.5 mg/dL    Protein, Total 6.2 6.0 - 8.0 g/dL    Albumin 3.6 3.5 - 5.0 g/dL    Alkaline Phosphatase 59 45 - 120 U/L    AST 17 0 - 40 U/L    ALT 22 0 - 45 U/L   HM1 (CBC with Diff)    Collection Time: 05/22/20  8:58 AM   Result Value Ref Range    WBC 4.8 4.0 - 11.0 thou/uL    RBC 3.70 (L) 3.80 - 5.40 mill/uL    Hemoglobin 10.9 (L) 12.0 - 16.0 g/dL    Hematocrit 33.7 (L) 35.0 - 47.0 %    MCV 91 80 - 100 fL    MCH 29.5 27.0 - 34.0 pg    MCHC 32.3 32.0 - 36.0 g/dL    RDW 12.1 11.0 - 14.5 %    Platelets 252 140 - 440 thou/uL    MPV 8.8 8.5 - 12.5 fL

## 2021-06-08 NOTE — PATIENT INSTRUCTIONS - HE
Recent Results (from the past 24 hour(s))   Comprehensive Metabolic Panel    Collection Time: 05/08/20  9:38 AM   Result Value Ref Range    Sodium 140 136 - 145 mmol/L    Potassium 3.8 3.5 - 5.0 mmol/L    Chloride 107 98 - 107 mmol/L    CO2 24 22 - 31 mmol/L    Anion Gap, Calculation 9 5 - 18 mmol/L    Glucose 98 70 - 125 mg/dL    BUN 14 8 - 22 mg/dL    Creatinine 0.79 0.60 - 1.10 mg/dL    GFR MDRD Af Amer >60 >60 mL/min/1.73m2    GFR MDRD Non Af Amer >60 >60 mL/min/1.73m2    Bilirubin, Total 0.4 0.0 - 1.0 mg/dL    Calcium 9.1 8.5 - 10.5 mg/dL    Protein, Total 6.9 6.0 - 8.0 g/dL    Albumin 3.9 3.5 - 5.0 g/dL    Alkaline Phosphatase 68 45 - 120 U/L    AST 18 0 - 40 U/L    ALT 20 0 - 45 U/L   HM1 (CBC with Diff)    Collection Time: 05/08/20  9:38 AM   Result Value Ref Range    WBC 4.3 4.0 - 11.0 thou/uL    RBC 4.20 3.80 - 5.40 mill/uL    Hemoglobin 12.4 12.0 - 16.0 g/dL    Hematocrit 38.4 35.0 - 47.0 %    MCV 91 80 - 100 fL    MCH 29.5 27.0 - 34.0 pg    MCHC 32.3 32.0 - 36.0 g/dL    RDW 12.2 11.0 - 14.5 %    Platelets 220 140 - 440 thou/uL    MPV 9.2 8.5 - 12.5 fL    Neutrophils % 48 (L) 50 - 70 %    Lymphocytes % 36 20 - 40 %    Monocytes % 11 (H) 2 - 10 %    Eosinophils % 4 0 - 6 %    Basophils % 1 0 - 2 %    Neutrophils Absolute 2.1 2.0 - 7.7 thou/uL    Lymphocytes Absolute 1.6 0.8 - 4.4 thou/uL    Monocytes Absolute 0.5 0.0 - 0.9 thou/uL    Eosinophils Absolute 0.2 0.0 - 0.4 thou/uL    Basophils Absolute 0.0 0.0 - 0.2 thou/uL

## 2021-06-09 NOTE — PROGRESS NOTES
Janeth is here today for ongoing management and tx of breast cancer. Today is D1C6 DD AC + T pending labs and OV with Antonella Paredes NP. Estelle Cameron

## 2021-06-09 NOTE — PROGRESS NOTES
Pt tolerated infusions well. Sada Welch' card given to pt; pt's daughter struggling with pt needed radiation now.

## 2021-06-09 NOTE — TELEPHONE ENCOUNTER
Called [employer] and relayed that [facility] has just reopened their financial assistance program and is now accepting applications. If interested in applying, instructed to call writer and application can be mailed to her for submission. Also wished her a happy birthday as it is only a few days away. Left contact information for future reference. Will follow up in the future.

## 2021-06-09 NOTE — PROGRESS NOTES
[facility] Hematology and Oncology Progress Note    Patient: Janeth Mathias  MRN: [id number]  Date of Service: 07/24/2020        Reason for Visit    1. Right breast cancer, adjuvant chemo    ECOG Performance   ECOG Performance Status: 1    Distress Assessment  Distress Assessment Score: 2    Pain  0    Assessment/Plan  1. Resected right breast cancer (ER/PA+), on adjuvant chemo  Is now on weekly Taxol, due for week 4.  Tolerating this quite well, with minimal fatigue.  Labwork adequate. Anemia appears to be gradually improving since completion of AC.    Return in 3 weeks, in week 7.     Radiation consult was completed with Dr. Walton. He did recommend adjuvant RT, sequential to completion of her chemo and patient has opted to proceed. She is to follow-up at completion of the chemotherapy for simulation.    2.  Rectal tear  Slowly improving with topical cream and keeping BMs soft/regular with colace daily.     3.   Grade 1 peripheral neuropathy  Stable, mild and intermittent in fingertips. Monitor on Taxol.    ______________________________________________________________________________    History of Present Illness/ Interval History    Ms. Janeth Mathias  is a 55 y.o. on weekly Taxol, due for week 4 (Cycle 6/day1) today.  No nausea. Fair appetite with some dysgeusia. Weight up.  Fatigue minimal, stays active.   Mild intermittent tingling in fingertips, stable over past 3 weeks.   No fevers.   Developed a small rectal tear after constipation earlier in chemo course, has been slowly healing and less painful.  Keeping bowels soft and regular with colace daily.    She maintains a very positive outlook through her treatment.    Review of Systems  Constitutional  Constitutional (WDL): Exceptions to WDL  Neurosensory  Neurosensory (WDL): Exceptions to WDL  Peripheral Sensory Neuropathy: Asymptomatic, loss of deep tendon reflexes or paresthesia(occ hands, positional)  Eye   Eye Disorder (WDL): Exceptions to WDL  Watering Eyes:  "Intervention not indicated  Ear  Ear Disorder (WDL): All ear disorder elements are within defined limits  Cardiovascular     Pulmonary  Respiratory (WDL): Exceptions to WDL  Dyspnea: Shortness of breath with moderate exertion(\"exercise induced asthma\")  Gastrointestinal  Gastrointestinal (WDL): Exceptions to WDL  Constipation: Occasional or intermittent symptoms, occasional use of stool softeners, laxatives, dietary modification, or enema(rectal fissure improving)  Dysgeusia: Altered taste but no change in diet  Genitourinary  Genitourinary (WDL): All genitourinary elements are within defined limits  Lymphatic  Lymph (WDL): All lymph disorder elements are within defined limits  Musculoskeletal and Connective Tissue  Musculoskeletal and Connetive Tissue Disorders (WDL): Exceptions to WDL  Arthralgia: Mild pain  Myalgia: Mild pain  Integumentary  Integumentary (WDL): Exceptions to WDL  Alopecia: Hair loss of >50% normal for that individual that is readily apparent to others, a wig or hair piece is necessary if the patient desires to completely camouflage the hair loss, associated with psychosocial impact  Patient Coping  Patient Coping: Open/discussion  Distress Assessment  Distress Assessment Score: 2  Accompanied by  Accompanied by: Alone      Oncology History/Treatment  Diagnosis/Stage:   November, 2019: Right breast cancer (T3-[id number]-Q2),   ER/NH+; HER2 neg.  -Presented with 4 month history right nipple inversion and more recent palpable right breast mass  -Mammogram: 5.4 cm right breast mass (12 o'clock)  -Core biopsy: invasive ductal carcinoma (ER/NH strongly positive, HER-2 negative)  -Final surgical pathology: 7.5 cm grade 2 invasive ductal ca with lymphovascular invasion. 1/3 nodes positive 1.1 cm met focus. 20% tumor was solid-type DCIS. ER/NH strongly positive, HER-2 negative.  -Oncotype DX score 24 (intermediate)  -Baseline MUGA 66%    Treatment:  -3/18/2020: Bilateral mastectomy and right sentinel node " dissection with tissue expanders. Surgical posterior margin positive.  -3/30/2020: Re-resection for positive margin  -5/8/2020: Initiated adjuvant DD AC (4 cycles, followed by DD Taxol weekly x 12 planned). Started weekly Taxol  phase on 7/3.      Past History  Past Medical History:   Diagnosis Date     Anxiety      Breast cancer (H)      Carpal tunnel syndrome      Depression      Exercise induced bronchospasm      Insomnia      Migraine      Osteoarthritis of knees, bilateral      Recurrent cold sores      Temporomandibular joint pain 03/1993    Uses a mouthguard.         Past Surgical History:   Procedure Laterality Date     ABDOMINOPLASTY  06/2019     CARPAL TUNNEL RELEASE Right 07/14/2008     COLONOSCOPY  11/01/2017    Repeat in 10 years.     NM SENTINEL NODE INJECTION  3/18/2020     PARTIAL KNEE ARTHROPLASTY Left 07/16/2018     PA BREAST RECONSTRUC W TISS EXPANDR Bilateral 3/18/2020    Procedure: BILATERAL BREAST RECONSTRUCTION WITH TISSUE EXPANDERS;  Surgeon: Sunni Hunter MD;  [place] [facility] [place]: Pl[place]     PA INSERT TISSUE EXPANDER(S) Right 3/30/2020    Procedure: REVISION RIGHT BREAST RECONSTRUCTION WITH PLACEMENT SUBMUSCULAR  TISSUE EXPANDER;  Surgeon: Sunni Hunter MD;  Location: [facility] OR;  Service: Plastics     PA MASTECTOMY, SIMPLE, COMPLETE Bilateral 3/18/2020    Procedure: Bilateral mastectomies;  Surgeon: Kayla Beaver MD;  Location: [facility] OR;  Service: General     PA MASTECTOMY, SIMPLE, COMPLETE Right 3/30/2020    Procedure: Revision Right Mastectomy;  Surgeon: Kayla Beaver MD;  Location: [facility] OR;  Service: General     REPLACEMENT UNICONDYLAR JOINT KNEE Right 08/14/2019     TONSILLECTOMY AND ADENOIDECTOMY  05/1994     TUBAL LIGATION  1993     TUNNELED VENOUS CATHETER PLACEMENT Left 3/30/2020    Procedure: Port Placement on the Left;  Surgeon: Kayla Beaver MD;  [place] [facility] [place]  Service: General     US BREAST  CORE BIOPSY RIGHT Right 2/10/2020       Physical Exam    Recent Vitals 7/24/2020   Height -   Weight 187 lbs 6 oz   BSA (m2) 1.93 m2   /84   Pulse 102   Temp 98.8   Temp src 1   SpO2 98   Some recent data might be hidden       GENERAL: Alert and oriented to time place and person. Seated comfortably. In no distress. Alone, masked.  HEAD: Atraumatic and normocephalic. Alopecia.  EYES: EMMA, EOMI. No erythema. No icterus.  ORAL CAVITY: Moist. No mucosal lesion or tonsillar enlargement.  NECK: supple. No thyroid enlargement.  LYMPH NODES: No palpable supraclavicular, cervical lymphadenopathy.  CHEST: clear to auscultation bilaterally. Resonant to percussion throughout bilaterally. Symmetrical breath movements bilaterally.  CVS: S1 and S2 are heard. Regular rate and rhythm. No murmur or gallop or rub heard.  ABDOMEN: Soft. Not tender. Not distended. No palpable hepatomegaly or splenomegaly. No other mass palpable. Bowel sounds present.  EXTREMITIES: Warm. No peripheral edema.  SKIN: no rash, or bruising or purpura.   NEURO: No gross deficit noted. Non-antalgic gait.      Lab Results    Recent Results (from the past 168 hour(s))   Comprehensive Metabolic Panel   Result Value Ref Range    Sodium 140 136 - 145 mmol/L    Potassium 4.0 3.5 - 5.0 mmol/L    Chloride 108 (H) 98 - 107 mmol/L    CO2 24 22 - 31 mmol/L    Anion Gap, Calculation 8 5 - 18 mmol/L    Glucose 98 70 - 125 mg/dL    BUN 16 8 - 22 mg/dL    Creatinine 0.74 0.60 - 1.10 mg/dL    GFR MDRD Af Amer >60 >60 mL/min/1.73m2    GFR MDRD Non Af Amer >60 >60 mL/min/1.73m2    Bilirubin, Total 0.3 0.0 - 1.0 mg/dL    Calcium 9.1 8.5 - 10.5 mg/dL    Protein, Total 6.4 6.0 - 8.0 g/dL    Albumin 3.6 3.5 - 5.0 g/dL    Alkaline Phosphatase 56 45 - 120 U/L    AST 20 0 - 40 U/L    ALT 34 0 - 45 U/L   HM1 (CBC with Diff)   Result Value Ref Range    WBC 4.8 4.0 - 11.0 thou/uL    RBC 3.11 (L) 3.80 - 5.40 mill/uL    Hemoglobin 9.9 (L) 12.0 - 16.0 g/dL    Hematocrit 30.2 (L)  35.0 - 47.0 %    MCV 97 80 - 100 fL    MCH 31.8 27.0 - 34.0 pg    MCHC 32.8 32.0 - 36.0 g/dL    RDW 17.4 (H) 11.0 - 14.5 %    Platelets 255 140 - 440 thou/uL    MPV 8.7 8.5 - 12.5 fL    Neutrophils % 70 50 - 70 %    Lymphocytes % 13 (L) 20 - 40 %    Monocytes % 11 (H) 2 - 10 %    Eosinophils % 4 0 - 6 %    Basophils % 1 0 - 2 %    Neutrophils Absolute 3.3 2.0 - 7.7 thou/uL    Lymphocytes Absolute 0.6 (L) 0.8 - 4.4 thou/uL    Monocytes Absolute 0.5 0.0 - 0.9 thou/uL    Eosinophils Absolute 0.2 0.0 - 0.4 thou/uL    Basophils Absolute 0.0 0.0 - 0.2 thou/uL       Imaging    Nm Muga    Result Date: 6/25/2020    The left ventricular ejection fraction is 73%.   The measured left ventricular ejection fraction on the prior study date of 6/3/2020 was 65%.        Billing  Total face to face time 10 minutes, with 5 minutes of that dedicated to counseling, education or coordination of care.     Signed by: Antonella Paredes

## 2021-06-09 NOTE — CONSULTS
Consults   [facility] Radiation Oncology Consult Note     Patient: Janeth Mathias  MRN: [id number]  Date of Service: 07/16/2020          Georgia Barrera MD  [address]  [address]  [place] [id number]       Dear Dr. Barrera:    Thank you very much for referring this patient for consideration of radiotherapy. As you know Ms. Mathias is a 55 y.o. female with a diagnosis of right breast cancer, stage T3 N1 M0, ER/DC positive, HER-2 negative, status post bilateral mastectomy and right sentinel lymph node resection and reexcision due to positive margin followed by adjuvant chemotherapy with 4 cycles of AC and 12 weekly Taxol.  The patient is referred to radiation oncology for evaluation and discussion of possible postop radiation therapy.    HISTORY OF PRESENT ILLNESS:   Ms. Mathias is a 55 y.o. female who has been in her usual state of good health until recently. She noted an inverted nipple on the right side in July 2019.  She had had her annual mammogram in September 2018 which did not show anything suspicious.  She did an Internet search and was reassured that inverted nipples can be normal.  Later on by November she did notice a small lump in her right breast.  She then sought medical care and the mammogram followed by ultrasound on 2/6/2020 showed a 5.4 x 1.8 x 4.6 cm lesion involving the right breast at the 12 o'clock position highly suspicious for malignancy.  There is no associated abnormal right axilla lymph no adenopathy.  She underwent ultrasound-guided needle biopsy on 2/10/2020 and pathology confirmed invasive breast cancer, ER/DC receptors positive and HER-2 negative. When she saw Dr. Beaver, the mass was noted to be large clinically and so it was decided to go for a bilateral mastectomy with right sentinel lymph node biopsy and tissue expander placement, which was done on 3/18/2020.  The pathology showed a 7.5 cm invasive ductal carcinoma, grade 2 with associated DCIS.  The margins was positive for invasive  carcinoma and DCIS at posterior margin.  1 of 3 removed sentinel lymph node showed evidence of metastatic disease with the largest metastatic focus measuring 11 x 3 mm with no evidence of extranodal extension.  Postoperatively she has been recovering well.  The patient was recommended to have adjuvant chemotherapy with 4 cycles of AC and 12 weekly Taxol and the patient is currently received 2 weeks of Taxol.  She is referred to radiation oncology for evaluation and discussion of possible postop radiation therapy to further reduce likelihood of cancer recurrence.    CHEMOTHERAPY HISTORY: Concurrent Chemotherapy: No    RADIATION THERAPY HISTORY: Prior Radiation: No    IMPLANTED CARDIAC DEVICE: none     PREGNANCY: The patient is informed not to be pregnant during the radiation therapy.  She is post menopausal.    Current Outpatient Medications   Medication Sig Dispense Refill     acetaminophen (TYLENOL) 500 MG tablet Take 500 mg by mouth every 6 (six) hours as needed for pain.       albuterol (PROAIR HFA;PROVENTIL HFA;VENTOLIN HFA) 90 mcg/actuation inhaler Inhale 2 puffs every 6 (six) hours as needed for wheezing.       ALPRAZolam (XANAX) 0.25 MG tablet Take 0.25-0.5 mg by mouth once as needed. For flying       dexAMETHasone (DECADRON) 4 MG tablet Take 8mg by mouth daily in the morning on days 2-4 after receiving Doxorubicin on cycles 1-4.. 24 tablet 0     diazePAM (VALIUM) 5 MG tablet Take 1 tablet (5 mg total) by mouth every 6 (six) hours as needed for muscle spasms. 15 tablet 0     escitalopram oxalate (LEXAPRO) 20 MG tablet Take 20 mg by mouth at bedtime.        lidocaine-prilocaine (EMLA) cream Apply to port site 1 hour before port needle access. 15 g 1     loratadine (CLARITIN) 10 mg tablet Take 1 tablet (10 mg total) by mouth daily. Take a tablet daily for a week after each chemo.  0     omeprazole (PRILOSEC) 20 MG capsule TAKE ONE CAPSULE BY MOUTH TWICE A DAY FOR 30 DAYS       ondansetron (ZOFRAN) 8 MG tablet  Take 1 tablet (8 mg total) by mouth every 8 (eight) hours as needed for nausea (Use as a backup after prochlorperazine.). 30 tablet 1     prochlorperazine (COMPAZINE) 10 MG tablet Take 1 tablet (10 mg total) by mouth every 6 (six) hours as needed (For breakthrough nausea/vomiting). 30 tablet 1     PROCTOSOL HC 2.5 % rectal cream ONE APPLICATION TWO TIMES A DAY FOR 14 DAYS       rizatriptan (MAXALT-MLT) 10 MG disintegrating tablet DISSOLVE 1 TABLET IN MOUTH AS NEEDED FOR MIGRAINE.       senna (SENNA) 8.6 mg tablet 1 tab(s)       traZODone (DESYREL) 50 MG tablet Take 50 mg by mouth at bedtime.       valACYclovir (VALTREX) 1000 MG tablet Take 2,000 mg by mouth 2 (two) times a day as needed.       zolpidem (AMBIEN) 10 mg tablet Take 10 mg by mouth at bedtime.       No current facility-administered medications for this visit.      Past Medical History:   Diagnosis Date     Anxiety      Breast cancer (H)      Carpal tunnel syndrome      Depression      Exercise induced bronchospasm      Insomnia      Migraine      Osteoarthritis of knees, bilateral      Recurrent cold sores      Temporomandibular joint pain 03/1993    Uses a mouthguard.     Past Surgical History:   Procedure Laterality Date     ABDOMINOPLASTY  06/2019     CARPAL TUNNEL RELEASE Right 07/14/2008     COLONOSCOPY  11/01/2017    Repeat in 10 years.     NM SENTINEL NODE INJECTION  3/18/2020     PARTIAL KNEE ARTHROPLASTY Left 07/16/2018     CA BREAST RECONSTRUC W TISS EXPANDR Bilateral 3/18/2020    Procedure: BILATERAL BREAST RECONSTRUCTION WITH TISSUE EXPANDERS;  Surgeon: Sunni Hunter MD;  Location: [facility] OR;  Service: Plastics     CA INSERT TISSUE EXPANDER(S) Right 3/30/2020    Procedure: REVISION RIGHT BREAST RECONSTRUCTION WITH PLACEMENT SUBMUSCULAR  TISSUE EXPANDER;  Surgeon: Sunni Hunter MD;  Location: [facility] OR;  Service: Plastics     CA MASTECTOMY, SIMPLE, COMPLETE Bilateral 3/18/2020    Procedure: Bilateral  mastectomies;  Surgeon: Kayla Beaver MD;  Location: [facility] OR;  Service: General     MN MASTECTOMY, SIMPLE, COMPLETE Right 3/30/2020    Procedure: Revision Right Mastectomy;  Surgeon: Kayla Beaver MD;  Location: [facility] OR;  Service: General     REPLACEMENT UNICONDYLAR JOINT KNEE Right 08/14/2019     TONSILLECTOMY AND ADENOIDECTOMY  05/1994     TUBAL LIGATION  1993     TUNNELED VENOUS CATHETER PLACEMENT Left 3/30/2020    Procedure: Port Placement on the Left;  Surgeon: Kayla Beaver MD;  Location: [facility];  Service: General     US BREAST CORE BIOPSY RIGHT Right 2/10/2020     Simvastatin and Penicillin  Family History   Problem Relation Age of Onset     Hypertension Mother      Hyperlipidemia Mother      Osteoarthritis Mother      Peripheral vascular disease Mother      Hypertension Father      Hyperlipidemia Father      Migraines Father      Depression Father      Osteoarthritis Father      Bipolar disorder Sister      Schizophrenia Sister      No Medical Problems Daughter      No Medical Problems Maternal Grandmother      Brain cancer Maternal Grandfather 65     No Medical Problems Paternal Grandmother      No Medical Problems Paternal Grandfather      No Medical Problems Maternal Aunt      No Medical Problems Paternal Aunt      Hyperlipidemia Sister      Depression Half Sister      Personality disorder Half Sister      Depression Son      Social History     Socioeconomic History     Marital status:      Spouse name: Not on file     Number of children: Not on file     Years of education: Not on file     Highest education level: Not on file   Occupational History     Occupation: Manager     Employer: [employer]   Social Needs     Financial resource strain: Not on file     Food insecurity     Worry: Not on file     Inability: Not on file     Transportation needs     Medical: Not on file     Non-medical: Not on file   Tobacco Use     Smoking status: Never Smoker      Smokeless tobacco: Never Used   Substance and Sexual Activity     Alcohol use: Yes     Comment: Very occasional.     Drug use: Never     Sexual activity: Not on file   Lifestyle     Physical activity     Days per week: Not on file     Minutes per session: Not on file     Stress: Not on file   Relationships     Social connections     Talks on phone: Not on file     Gets together: Not on file     Attends Congregational service: Not on file     Active member of club or organization: Not on file     Attends meetings of clubs or organizations: Not on file     Relationship status: Not on file     Intimate partner violence     Fear of current or ex partner: Not on file     Emotionally abused: Not on file     Physically abused: Not on file     Forced sexual activity: Not on file   Other Topics Concern     Not on file   Social History Narrative     Not on file        Review of Systems:      General  Constitutional (WDL): Exceptions to WDL  Fatigue: Fatigue relieved by rest  Hot flashes/Night Sweats: Mild symptoms, no intervention needed  EENT  Eye Disorder (WDL): All eye disorder elements are within defined limits  Ear Disorder (WDL): All ear disorder elements are within defined limits  Respiratory   Respiratory (WDL): Exceptions to WDL  Dyspnea: Shortness of breath with moderate exertion  Cardiovascular  Cardiovascular (WDL): All cardiovascular elements are within defined limits  Endocrine     Gastrointestinal  Gastrointestinal (WDL): All gastrointestinal elements are within defined limits  Musculoskeletal  Musculoskeletal and Connetive Tissue Disorders (WDL): Exceptions to WDL  Arthralgia: Mild pain(knees, chronic)  Integumentary               Integumentary (WDL): Exceptions to WDL  Alopecia: Hair loss of >50% normal for that individual that is readily apparent to others, a wig or hair piece is necessary if the patient desires to completely camouflage the hair loss, associated with psychosocial  impact  Neurological  Neurosensory (WDL): Exceptions to WDL  Peripheral Sensory Neuropathy: Asymptomatic, loss of deep tendon reflexes or paresthesia(occ in hands)  Psychological/Emotional   Patient Coping: Accepting;Open/discussion  Hematological/Lymphatic  Lymph (WDL): All lymph disorder elements are within defined limits  Dermatologic     Genitourinary/Reproductive  Genitourinary (WDL): All genitourinary elements are within defined limits  Reproductive     Pain              Currently in Pain: No/denies  Accompanied by  Accompanied by: Alone    Imaging: Reviewed    Pathology: Reviewed    ECOG Peformance Status  ECOG Performance Status: 1  Distress Assessment Score: 2(figuring out/planning treatment)    Objective:      PHYSICAL EXAMINATION:    LMP 11/01/2014     Gen: Alert, in NAD  Eyes: PERRL, EOMI, sclera anicteric  HENT     Head: NC/AT     Ears: No external auricular lesions     Nose/sinus: No rhinorrhea or epistaxis     Oropharynx: MMM, no visible oral lesions  Neck: Supple, full ROM, no LAD  Back: No step-offs or pain to palpation along the thoracolumbar spine  Rectal: Deferred  : Deferred  Musculoskeletal: Normal muscle bulk and tone  Skin: Normal color and turgor  Neurologic: A/Ox3, CN II-XII intact, normal gait and station  Psychiatric: Appropriate mood and affect    Intent of Therapy: Curative    We recommend adjuvant radiation as part of her breast conserving therapy to decrease her chance of local recurrence to the single digits. ROM stretches and skin care were discussed with the patient. She understands that these maneuvers need to continue for 6 months following completion of radiation as skin and muscle fibrosis continue to form for weeks to months following completion of therapy.      Side effects that may occur during or within weeks after radiation therapy      Fatigue and general weakness    Darkening, irritation, itchiness, redness, dryness, erythema, peeling, scabbing, ulceration and  contraction of the skin of the breast and chest    Swelling of the breast    Loss of armpit hair    Lung irritation    Decrease in appetite    Side effects that may occur months or years after radiation therapy      Development of another tumor or cancer    Thickening, telangiectasias (development of spider like blood vessels in the skin) and ulceration of the skin of the breast and chest    Firming, fibrosis (scar tissue), fat necrosis, and distortion of the breast    Poor healing after a trauma or surgery in the irradiated area    Nerve damage resulting in loss of arm strength and sensation    Coronary artery blockage causing angina pain or a heart attack    Lung inflammation of fibrosis causing cough, fever and shortness of breath    Fracture of the ribs    Swellingof an arm and hand    The risks, benefits and alternatives to radiation therapy were outlined with the patient. All questions were answered and a consent was signed.     Impression     Right breast cancer, stage T3 N1 M0, ER/OH positive, HER-2 negative, status post bilateral mastectomy and right sentinel lymph node resection and reexcision due to positive margin followed by adjuvant chemotherapy with 4 cycles of AC and 12 weekly Taxol.    Assessment & Plan:     I have personally reviewed her upcoming medical record today.  I have also discussed with the patient about all the possible treatment options for breast cancer including surgery, systemic therapy, and the radiation therapy.  The possible risks and side effects of radiation therapy has been discussed with the patient in detail and at the great lengths.  Questions are answered to patient satisfaction.  The NCCN guideline of breast cancer treatment has also been reviewed with the patient.  I agree the patient is good candidate and indicated for postop radiation therapy for her breast cancer.  I believe the patient can be potentially benefited by radiation therapy for local control and possibly a  better survival.  Therefore radiation therapy is offered to the patient and she is willing to proceed being aware of potential risks and side effects well.  The patient was informed radiation oncology at the end of her last cycle of chemotherapy to set up time for follow-up and simulation.  I plan to give radiation therapy at 180 cGy each fraction to a total of 5040 cGy in 28 treatments targeted to the right breast and regional lymph nodes using a 4 field technique followed by possibly additional 1000 Gy in 5 fractions boost to the primary tumor bed.    Again, thank you very much for the referral and allowing me to participate in the care of this patient.  If you have any questions or concerns about this consultation, please do not hesitate to call.  I spent approximately 45 minutes today with the patient and 80% time was used for counseling.      Sincerely,          Italia Walton MD, PhD  Department of Radiation Oncology   [facility]  Tel: [phone]  Page: [phone]    [facility]  [address] [place] [address]     [address] [place] [address]    CC:  Patient Care Team:  Ella Moreno MD as PCP - General (Family Medicine)  Georgia Barrera MD as Physician (Hematology and Oncology)  Neptali Reynaga CNP as Nurse Practitioner (Hematology and Oncology)  Christina Shirley RN as Oncology Nurse Navigator (Hematology and Oncology)  Italia Walton MD as Physician (Radiation Oncology)  Kayla Beaver MD as Physician (General Surgery)

## 2021-06-09 NOTE — PROGRESS NOTES
Patient is here for two week follow-up of breast cancer. Due D1C5 paclitaxel pending labs/OV with Dr. Barrera.  Shannen Haynes RN

## 2021-06-09 NOTE — PROGRESS NOTES
[facility] Hematology and Oncology Progress Note    Patient: Janeth Mathias  MRN: [id number]  Date of Service: 07/03/2020        Reason for Visit    Follow-up of right-sided breast cancer.    Assessment and Plan  Cancer Staging  Malignant neoplasm of upper-inner quadrant of right breast in female, estrogen receptor positive (H)  Staging form: Breast, AJCC 8th Edition  - Pathologic stage from 4/28/2020: Stage IB (pT3, pN1a(sn), cM0, G2, ER+, CO+, HER2-, Oncotype DX score: 24) - Signed by Georgia Barrera MD on 4/28/2020      ECOG Performance   ECOG Performance Status: 1    Distress Assessment  Distress Assessment Score: 1: Please see the discussion below.    Pain   : No pain.    Ms. Janeth Mathias is a 54 y.o. woman who noticed an inverted nipple on the right side in July 2019 and a lump in the right breast by November 2019.  The mammogram showed a mass within the right breast at the 12 o'clock position which on imaging was about 5.4 cm in size.  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  She had a bilateral mastectomy and right-sided sentinel lymph node dissection with tissue expander placement done on 3/18/2020.  The posterior margin of the sample on the right side came back positive and so she had to have a reresection done on 3/30/2020.  At that time she had a Port-A-Cath also placed for chemotherapy.     Final pathology showed a 7.5 cm, grade 2, invasive ductal carcinoma, which also had some lymphovascular invasion.  1 of the 3 lymph nodes was positive for a 1.1 cm metastatic focus.  20% of the tumor was a solid type DCIS.  Final stage was pT3, pN1a, cM0.  Estrogen and progesterone receptor strongly positive and HER-2 negative.    The Oncotype DX score came back at 24.  Even though by the score alone this comes in the intermediate range, when we add in the clinical features, especially the size of the cancer and the  lymph node positivity the recurrence risk is significantly high.  Thus we decided to go for adjuvant chemotherapy followed by a radiation oncology consultation and then adjuvant estrogen blockade therapy.  We decided on chemotherapy with dose dense AC for 4 cycles followed by Taxol given once weekly for 12 weeks.  Chemotherapy was started on 5/8/2020.    1.  She has now completed 4 cycles of dose dense AC chemotherapy.  Overall I think she has tolerated chemotherapy well.  She has had a MUGA scan done after cycle #4 on 6/5/2020 which shows a good aeration fraction of 73%.  She was glad to hear the results.  She also has some anemia as a result of chemotherapy.  I think the anemia will not be much of a problem going forward because intensity of chemotherapy is coming down.  She feels ready to proceed to single agent Taxol given once weekly for 12 weeks.    2.  She says that she would like to meet with radiation oncology soon so that she can plan for completing radiation if that is needed soon after chemotherapy and then completing her plastic surgery in this calendar year.  I discussed with her that she may benefit from adjuvant radiation to her chest wall because of the lymph node with a 1.1 metastatic focus of cancer in the axilla.  I will place a referral to radiation oncology and request for an appointment to be made for her soon.  A decision regarding adjuvant radiation can be made after discussion between her and her radiation oncologist.    3.  Regarding the rectal fissure, I discussed with her that she should try to keep her stools as soft as possible.  My preference is for laxatives like MiraLAX or Citrucel.  The key is not to strain too much and have an easy bowel movement.  She can also use sitz bath with Epsom salts which will help with the pain.  From her description I think the fissure is slowly healing.  She voiced understanding.    4.  I discussed with her that in her particular case we will have to  watch carefully for worsening of anemia due to chemotherapy and also for peripheral neuropathy while she is on single agent Taxol.  I also warned her about the possibility of muscle aches that some people feel when they are on a single agent Taxol.  We also discussed about nail changes that people commonly notice.    5.  Labs from today were reviewed.  Metabolic panel is completely stable.  Blood counts show a hemoglobin of 9.9.  WBC count is normal and platelet count is normal.  With this hemoglobin level, I think it is okay to proceed with single agent Taxol chemotherapy starting today.    6.  Follow-up: She will be scheduled for chemotherapy again in 1 week and 2 weeks with labs according to treatment plan.  Return to clinic with MD or NP in 3 weeks when she is due to start the next cycle of single agent Taxol.  Labs according to treatment plan.    Time spend >25 minutes face to face with the patient. More than 50 % in counseling and coordination of care.      Problem List    1. Malignant neoplasm of upper-inner quadrant of right breast in female, estrogen receptor positive (H)  CC OFFICE VISIT LONG    Ambulatory referral to Radiation Therapy    Infusion Appointment    Infusion Appointment    CC OFFICE VISIT LONG    Infusion Appointment    Infusion Appointment    Infusion Appointment    CC OFFICE VISIT LONG    Infusion Appointment    Infusion Appointment    Infusion Appointment   2. Encounter for antineoplastic chemotherapy  CC OFFICE VISIT LONG    Infusion Appointment    Infusion Appointment    CC OFFICE VISIT LONG    Infusion Appointment    Infusion Appointment    Infusion Appointment    CC OFFICE VISIT LONG    Infusion Appointment    Infusion Appointment    Infusion Appointment   3. Anemia associated with chemotherapy          CC: Ella Moreno MD        ______________________________________________________________________________    History of Present Illness    Ms. Janeth Mathias is here for follow-up  alone.    She says that she feels that she is doing okay after completing the 4 cycles of dose dense AC chemotherapy.  She does have some mild fatigue.  She is still working and she is still able to do everything that she needs to do but the end of the day she does feel tired.    The rectal fissure is slowly healing.  Some days it hurts and some days it does not.    She states that nowadays she is having a bland diet.  Food does not taste that great to her.  Weight is overall stable.    She states that occasionally she has some tingling in her fingertips but no sustained numbness or other symptoms.    Things have calmed down at home.  Her son is now in a treatment.    No fevers or night sweats.  No lumps or bumps anywhere.  No unusual headaches.  No eyesight problems.  No mouth sores.  No swallowing difficulty.  No nausea or vomiting.  No abdominal pain.  No constipation or diarrhea now.  No blood in stool or black stools.  No difficulty with urination.  No skin rashes.  No difficulty breathing.  No chest pain or cough.    Please see below.  A 14 point review of system is otherwise completely negative.    Pain Status  Currently in Pain: No/denies    Review of Systems    Constitutional  Constitutional (WDL): Exceptions to WDL  Fatigue: Fatigue relieved by rest  Fever: None  Chills: None  Weight Gain: None  Weight Loss: None  Neurosensory  Neurosensory (WDL): Exceptions to WDL  Peripheral Motor Neuropathy: None  Ataxia: None  Peripheral Sensory Neuropathy: Asymptomatic, loss of deep tendon reflexes or paresthesia(occ hands)  Confusion: None  Syncope: None  Eye   Eye Disorder (WDL): All eye disorder elements are within defined limits  Ear  Ear Disorder (WDL): All ear disorder elements are within defined limits  Cardiovascular  Cardiovascular (WDL): All cardiovascular elements are within defined limits  Pulmonary  Respiratory (WDL): Exceptions to WDL(hx exercise induced asthma)  Cough: None  Dyspnea: Shortness of breath  with moderate exertion  Hypoxia: None  Gastrointestinal  Gastrointestinal (WDL): Exceptions to WDL  Anorexia: None  Constipation: Occasional or intermittent symptoms, occasional use of stool softeners, laxatives, dietary modification, or enema(rectal pain from fissure)  Diarrhea: None  Dysphagia: None  Esophagitis: None(on PPI)  Nausea: None  Pharyngitis: None  Vomiting: None  Dysgeusia: None  Dry Mouth: None  Genitourinary  Genitourinary (WDL): All genitourinary elements are within defined limits  Urinary Frequency: None  Urinary Retention: None  Urinary Tract Pain: None  Lymphatic  Lymph (WDL): All lymph disorder elements are within defined limits  Musculoskeletal and Connective Tissue  Musculoskeletal and Connetive Tissue Disorders (WDL): Exceptions to WDL  Arthralgia: Mild pain(knees, generalized)  Integumentary  Integumentary (WDL): Exceptions to WDL  Alopecia: Hair loss of >50% normal for that individual that is readily apparent to others, a wig or hair piece is necessary if the patient desires to completely camouflage the hair loss, associated with psychosocial impact  Rash Maculo-Papular: None  Pruritus: None  Urticaria: None  Palmar-Plantar Erythrodysesthesia Syndrome: None  Flushing: None  Patient Coping  Patient Coping: Open/discussion  Distress Assessment  Distress Assessment Score: 1  Accompanied by     Oral Chemo Adherence         Past History  Past Medical History:   Diagnosis Date     Anxiety      Carpal tunnel syndrome      Depression      Exercise induced bronchospasm      Insomnia      Migraine      Osteoarthritis of knees, bilateral      Recurrent cold sores      Temporomandibular joint pain 03/1993    Uses a mouthguard.         Past Surgical History:   Procedure Laterality Date     ABDOMINOPLASTY  06/2019     CARPAL TUNNEL RELEASE Right 07/14/2008     COLONOSCOPY  11/01/2017    Repeat in 10 years.     NM SENTINEL NODE INJECTION  3/18/2020     PARTIAL KNEE ARTHROPLASTY Left 07/16/2018     GA  BREAST RECONSTRUC W TISS EXPANDR Bilateral 3/18/2020    Procedure: BILATERAL BREAST RECONSTRUCTION WITH TISSUE EXPANDERS;  Surgeon: Sunni Hunter MD;  [place] [facility]  Service: Plastics     FL INSERT TISSUE EXPANDER(S) Right 3/30/2020    Procedure: REVISION RIGHT BREAST RECONSTRUCTION WITH PLACEMENT SUBMUSCULAR  TISSUE EXPANDER;  Surgeon: Sunni Hunter MD;  Location: [facility] OR;  Service: Plastics     FL MASTECTOMY, SIMPLE, COMPLETE Bilateral 3/18/2020    Procedure: Bilateral mastectomies;  Surgeon: Kayla Beaver MD;  Location: [facility] OR;  Service: General     FL MASTECTOMY, SIMPLE, COMPLETE Right 3/30/2020    Procedure: Revision Right Mastectomy;  Surgeon: Kayla Beaver MD;  [place] [facility]  Service: General     REPLACEMENT UNICONDYLAR JOINT KNEE Right 08/14/2019     TONSILLECTOMY AND ADENOIDECTOMY  05/1994     TUBAL LIGATION  1993     TUNNELED VENOUS CATHETER PLACEMENT Left 3/30/2020    Procedure: Port Placement on the Left;  Surgeon: Kayla Beaver MD;  [place] [facility];  Service: General     US BREAST CORE BIOPSY RIGHT Right 2/10/2020       Physical Exam    Recent Vitals 7/3/2020   Height -   Weight 181 lbs 5 oz   BSA (m2) 1.9 m2   /67   Pulse 122   Temp 98.9   Temp src 1   SpO2 96   Some recent data might be hidden       GENERAL: Alert and oriented to time place and person. Seated comfortably. In no distress.  She looks well overall.  Heavyset.    HEAD: Atraumatic and normocephalic.    EYES: EMMA, EOMI.  No pallor.  No icterus.    Oral cavity: no mucosal lesion or tonsillar enlargement.    NECK: supple. JVP normal.  No thyroid enlargement.    LYMPH NODES: No palpable, cervical, axillary or inguinal lymphadenopathy.    CHEST: clear to auscultation bilaterally.  Resonant to percussion throughout bilaterally.  Symmetrical breath movements bilaterally.    The Port-A-Cath over the left upper chest looks unremarkable.    CVS: S1 and  S2 are heard. Regular rate and rhythm.  No murmur or gallop or rub heard.  No peripheral edema.    ABDOMEN: Soft. Not tender. Not distended.  No palpable hepatomegaly or splenomegaly.  No other mass palpable.  Bowel sounds heard.    EXTREMITIES: Warm.    SKIN: no rash, or bruising or purpura.  She has alopecia.        Lab Results    Recent Results (from the past 168 hour(s))   Comprehensive Metabolic Panel   Result Value Ref Range    Sodium 138 136 - 145 mmol/L    Potassium 3.7 3.5 - 5.0 mmol/L    Chloride 106 98 - 107 mmol/L    CO2 25 22 - 31 mmol/L    Anion Gap, Calculation 7 5 - 18 mmol/L    Glucose 92 70 - 125 mg/dL    BUN 11 8 - 22 mg/dL    Creatinine 0.80 0.60 - 1.10 mg/dL    GFR MDRD Af Amer >60 >60 mL/min/1.73m2    GFR MDRD Non Af Amer >60 >60 mL/min/1.73m2    Bilirubin, Total 0.2 0.0 - 1.0 mg/dL    Calcium 9.3 8.5 - 10.5 mg/dL    Protein, Total 6.5 6.0 - 8.0 g/dL    Albumin 3.8 3.5 - 5.0 g/dL    Alkaline Phosphatase 85 45 - 120 U/L    AST 14 0 - 40 U/L    ALT 17 0 - 45 U/L   HM1 (CBC with Diff)   Result Value Ref Range    WBC 10.3 4.0 - 11.0 thou/uL    RBC 3.24 (L) 3.80 - 5.40 mill/uL    Hemoglobin 9.9 (L) 12.0 - 16.0 g/dL    Hematocrit 30.8 (L) 35.0 - 47.0 %    MCV 95 80 - 100 fL    MCH 30.6 27.0 - 34.0 pg    MCHC 32.1 32.0 - 36.0 g/dL    RDW 17.6 (H) 11.0 - 14.5 %    Platelets 187 140 - 440 thou/uL    MPV 9.2 8.5 - 12.5 fL       Imaging    Nm Muga    Result Date: 6/25/2020    The left ventricular ejection fraction is 73%.   The measured left ventricular ejection fraction on the prior study date of 6/3/2020 was 65%.      Nm Muga    Result Date: 6/3/2020    1.The left ventricular ejection fraction is 65%.   2.Normal study.   3.The measured left ventricular ejection fraction on the prior study date of 4/14/2020 was 66%, no change between studies.          Signed by: Georgia Barrera MD

## 2021-06-09 NOTE — PROGRESS NOTES
Pt here by herself for consult with Dr. Walton. She still has several weeks of chemo to go so we will not do simulation CT today, but bring her back at the end of chemo for f/u and simulation CT. I gave her a new patient RT folder and we discussed the routine for RT including consult, simulation, tx position, daily treatment, weekly RO visits, and common s/e of skin irritation and fatigue. She verbalized her understanding.

## 2021-06-09 NOTE — TELEPHONE ENCOUNTER
Called Janeth to check in. She has now completed 4 cycles of AC. She says that overall  she has tolerated chemo well.She had a rectal tear related o constipation and relayed that this has been her worst symptom for her as it has been painful. She will start taxol in the near future. She relayed that she has embraced losing her hair and for the most part has elected not to  wear  a wig. She wears a scarf or has gone without a head covering. She has not had nausea and is pleased about this. She has worked some shorter shifts post chemo and her employer has been very flexible and understanding. She has a very good support system with friends and communicated with them regularly. She has the supportive resources that writer has sent to her in the past and may access in the future. She does not have any needs for navigator today. She was encouraged to call anytime with questions, concerns or support needs. Will follow up in the future.

## 2021-06-09 NOTE — TELEPHONE ENCOUNTER
Janeth called  and KELLY that she would like application for [facility] and requested that writer mail to her.  returned her call and relayed that partially completed application placed in mail today for her to finish and send directly to [facility] when completed. She is scheduled for a radiation consult in a couple of weeks as she is hoping to keep her care moving along with goal of completing cancer treatments this year Affirmed her ability to advocate for herself. She expressed appreciation for the follow up.

## 2021-06-09 NOTE — PROGRESS NOTES
Pt amb into clinic for chemo infusion. Reviewed plan of care. Pt premedicated. Pt tolerated Taxol infusion well. Port  Flushed, de-accessed. Pt amb out of clinic.

## 2021-06-09 NOTE — PATIENT INSTRUCTIONS - HE
Recent Results (from the past 24 hour(s))   Comprehensive Metabolic Panel    Collection Time: 07/03/20  8:42 AM   Result Value Ref Range    Sodium 138 136 - 145 mmol/L    Potassium 3.7 3.5 - 5.0 mmol/L    Chloride 106 98 - 107 mmol/L    CO2 25 22 - 31 mmol/L    Anion Gap, Calculation 7 5 - 18 mmol/L    Glucose 92 70 - 125 mg/dL    BUN 11 8 - 22 mg/dL    Creatinine 0.80 0.60 - 1.10 mg/dL    GFR MDRD Af Amer >60 >60 mL/min/1.73m2    GFR MDRD Non Af Amer >60 >60 mL/min/1.73m2    Bilirubin, Total 0.2 0.0 - 1.0 mg/dL    Calcium 9.3 8.5 - 10.5 mg/dL    Protein, Total 6.5 6.0 - 8.0 g/dL    Albumin 3.8 3.5 - 5.0 g/dL    Alkaline Phosphatase 85 45 - 120 U/L    AST 14 0 - 40 U/L    ALT 17 0 - 45 U/L   HM1 (CBC with Diff)    Collection Time: 07/03/20  8:42 AM   Result Value Ref Range    WBC 10.3 4.0 - 11.0 thou/uL    RBC 3.24 (L) 3.80 - 5.40 mill/uL    Hemoglobin 9.9 (L) 12.0 - 16.0 g/dL    Hematocrit 30.8 (L) 35.0 - 47.0 %    MCV 95 80 - 100 fL    MCH 30.6 27.0 - 34.0 pg    MCHC 32.1 32.0 - 36.0 g/dL    RDW 17.6 (H) 11.0 - 14.5 %    Platelets 187 140 - 440 thou/uL    MPV 9.2 8.5 - 12.5 fL

## 2021-06-10 NOTE — PROGRESS NOTES
Pt arrives ambulatory to [facility]. Pt reports unrelieved fatigue, and slight worsening of neuropathy. Port accessed with good blood return, labs drawn, and flushed. Labs WDL and proceeding with treatment. Pt given pre-meds, and tolerated Paclitaxel well. Port flushed, heparinized, de-accessed and covered. Pt left department ambulatory.

## 2021-06-10 NOTE — TELEPHONE ENCOUNTER
Called Janeth to check in. She has completed AC and is now onweekly Taxol. She expressed that she feels that she is tolerating better than AC. She is tired the day of her infusion and takes the day off but continues to work four days a week and appreciates the flexibility of her employer. She has had a consult with radiation oncology and will have SIM after completion of chemo. She has a goal of keeping her care progressing to complete by the end of the calendar year.She received the stipend from [facility]. No needs for navigator today. She appreciates the check in. Encouraged her to reach out if any support needs arise. Will follow up in the future.

## 2021-06-10 NOTE — PROGRESS NOTES
Pt states neuropathy in fingertips and toes is now there all the time, not spreading further, just present.

## 2021-06-10 NOTE — PROGRESS NOTES
Patient is here for labs, provider, and treatment for Malignant neoplasm of upper-inner quadrant of right breast in female, estrogen receptor positive (H).

## 2021-06-10 NOTE — PROGRESS NOTES
Pt arrives ambulatory to Cancer Infusion Dept. Pt in chair #9. Pt reports increased sensory changes in toes bilaterally. Port accessed with good blood return, labs drawn, and flushed. Labs WDL and proceeding with treatment. Pt given pre-meds and tolerated Paclitaxel infusion well. Icepacks placed on bottom of feet during infusion. Port showed good blood return, heparinized, de-accessed and covered. Pt left department ambulatory.

## 2021-06-10 NOTE — PROGRESS NOTES
[facility] Hematology and Oncology Progress Note    Patient: Janeth Mathias  MRN: [id number]  Date of Service: 08/14/2020        Reason for Visit    1. Right breast cancer, adjuvant chemo    ECOG Performance   ECOG Performance Status: 1    Distress Assessment  Distress Assessment Score: 3    Pain  0    Assessment/Plan  1. Resected right breast cancer (ER/MO+), on adjuvant chemo  Is now on weekly Taxol, due for week 7.  Tolerating this quite well, with minimal fatigue and intermittent grade 1 peripheral neuropathy.  Labwork adequate. Anemia stable.    Proceed with next cycle at full dose.    Return in 3 weeks, in week 10.     Radiation consult was completed with Dr. Walton. He did recommend adjuvant RT, sequential to completion of her chemo and patient has opted to proceed. She is to follow-up at completion of the chemotherapy for simulation.    2.  Rectal tear  Resolved. Keeping BMs soft/regular with colace daily.    3.   Grade 1 peripheral neuropathy  Stable, mild, non-painful  and intermittent in fingertips and toes. Last few weeks, has felt more notable in her toes after being on her feet all day but remains intermittent and not interfering with function. Monitor closely on Taxol.  ______________________________________________________________________________    History of Present Illness/ Interval History    Ms. Janeth Mathias  is a 55 y.o. on weekly Taxol, due for week 7 (Cycle 7/day1) today.  No nausea. Fair appetite with some dysgeusia. Weight stable.  Fatigue minimal, stays active.  Mild intermittent tingling in fingertips and toes. Sensation is a bit more notable in her toes after being on her feet all day. Not interfering with function and not painful.  No fevers.   Rectal discomfort from tear resolved.      Review of Systems  Constitutional  Constitutional (WDL): All constitutional elements are within defined limits  Neurosensory  Neurosensory (WDL): Exceptions to WDL  Peripheral Sensory Neuropathy:  Concerns(fingertips and toes)  Eye   Eye Disorder (WDL): Exceptions to WDL  Watering Eyes: Concerns  Ear  Ear Disorder (WDL): Exceptions to WDL  Ear Pain: Concerns(itchy pain x1 week)  Cardiovascular  Cardiovascular (WDL): All cardiovascular elements are within defined limits  Pulmonary  Respiratory (WDL): Within Defined Limits  Gastrointestinal  Gastrointestinal (WDL): Exceptions to WDL  Dysgeusia: Concerns  Mucositis Oral: Concerns(1 under tongue)  Genitourinary  Genitourinary (WDL): All genitourinary elements are within defined limits  Lymphatic  Lymph (WDL): All lymph disorder elements are within defined limits  Musculoskeletal and Connective Tissue  Musculoskeletal and Connetive Tissue Disorders (WDL): Exceptions to WDL  Arthralgia: Concerns(generalized aches and pains)  Integumentary  Integumentary (WDL): All integumentary elements are within defined limits  Patient Coping  Patient Coping: Accepting;Open/discussion      Oncology History/Treatment  Diagnosis/Stage:   November, 2019: Right breast cancer (T3-tB2b-T7),   ER/NV+; HER2 neg.  -Presented with 4 month history right nipple inversion and more recent palpable right breast mass  -Mammogram: 5.4 cm right breast mass (12 o'clock)  -Core biopsy: invasive ductal carcinoma (ER/NV strongly positive, HER-2 negative)  -Final surgical pathology: 7.5 cm grade 2 invasive ductal ca with lymphovascular invasion. 1/3 nodes positive 1.1 cm met focus. 20% tumor was solid-type DCIS. ER/NV strongly positive, HER-2 negative.  -Oncotype DX score 24 (intermediate)  -Baseline MUGA 66%    Treatment:  -3/18/2020: Bilateral mastectomy and right sentinel node dissection with tissue expanders. Surgical posterior margin positive.  -3/30/2020: Re-resection for positive margin  -5/8/2020: Initiated adjuvant DD AC (4 cycles, followed by DD Taxol weekly x 12 planned). Started weekly Taxol  phase on 7/3.      Past History  Past Medical History:   Diagnosis Date     Anxiety      Breast  cancer (H)      Carpal tunnel syndrome      Depression      Exercise induced bronchospasm      Insomnia      Migraine      Osteoarthritis of knees, bilateral      Recurrent cold sores      Temporomandibular joint pain 03/1993    Uses a mouthguard.         Past Surgical History:   Procedure Laterality Date     ABDOMINOPLASTY  06/2019     CARPAL TUNNEL RELEASE Right 07/14/2008     COLONOSCOPY  11/01/2017    Repeat in 10 years.     NM SENTINEL NODE INJECTION  3/18/2020     PARTIAL KNEE ARTHROPLASTY Left 07/16/2018     IN BREAST RECONSTRUC W TISS EXPANDR Bilateral 3/18/2020    Procedure: BILATERAL BREAST RECONSTRUCTION WITH TISSUE EXPANDERS;  Surgeon: Sunni Hunter MD;  Location: [facility] OR;  Service: Plastics     IN INSERT TISSUE EXPANDER(S) Right 3/30/2020    Procedure: REVISION RIGHT BREAST RECONSTRUCTION WITH PLACEMENT SUBMUSCULAR  TISSUE EXPANDER;  Surgeon: Sunni Hunter MD;  Location: [facility] OR;  Service: Plastics     IN MASTECTOMY, SIMPLE, COMPLETE Bilateral 3/18/2020    Procedure: Bilateral mastectomies;  Surgeon: Kayla Beaver MD;  Location: [facility] OR;  Service: General     IN MASTECTOMY, SIMPLE, COMPLETE Right 3/30/2020    Procedure: Revision Right Mastectomy;  Surgeon: Kayla Beaver MD;  Location: [facility] OR;  Service: General     REPLACEMENT UNICONDYLAR JOINT KNEE Right 08/14/2019     TONSILLECTOMY AND ADENOIDECTOMY  05/1994     TUBAL LIGATION  1993     TUNNELED VENOUS CATHETER PLACEMENT Left 3/30/2020    Procedure: Port Placement on the Left;  Surgeon: Kayla Beaver MD;  Location: [facility];  Service: General     US BREAST CORE BIOPSY RIGHT Right 2/10/2020       Physical Exam    Recent Vitals 8/14/2020   Weight 188 lbs 6 oz   BSA (m2) 1.93 m2   /69   Pulse 111   Temp 98.7   Temp src 1   SpO2 98   Some recent data might be hidden       GENERAL: Alert and oriented to time place and person. Seated comfortably. In no distress. Alone,  masked.  HEAD: Atraumatic and normocephalic. Complete alopecia.  EYES: EMMA, EOMI. No erythema. No icterus.  ORAL CAVITY: Moist. No mucosal lesion or tonsillar enlargement.  NECK: supple. No thyroid enlargement.  LYMPH NODES: No palpable supraclavicular, cervical, axillary lymphadenopathy.  CHEST: clear to auscultation bilaterally. Resonant to percussion throughout bilaterally. Symmetrical breath movements bilaterally.  CVS: S1 and S2 are heard. Regular rate and rhythm. No murmur or gallop or rub heard.  EXTREMITIES: Warm. No peripheral edema.  SKIN: no rash, or bruising or purpura.   NEURO: No gross deficit noted. Non-antalgic gait.    Lab Results    Recent Results (from the past 168 hour(s))   Comprehensive Metabolic Panel   Result Value Ref Range    Sodium 139 136 - 145 mmol/L    Potassium 4.1 3.5 - 5.0 mmol/L    Chloride 107 98 - 107 mmol/L    CO2 23 22 - 31 mmol/L    Anion Gap, Calculation 9 5 - 18 mmol/L    Glucose 118 70 - 125 mg/dL    BUN 15 8 - 22 mg/dL    Creatinine 0.81 0.60 - 1.10 mg/dL    GFR MDRD Af Amer >60 >60 mL/min/1.73m2    GFR MDRD Non Af Amer >60 >60 mL/min/1.73m2    Bilirubin, Total 0.4 0.0 - 1.0 mg/dL    Calcium 9.3 8.5 - 10.5 mg/dL    Protein, Total 6.3 6.0 - 8.0 g/dL    Albumin 3.6 3.5 - 5.0 g/dL    Alkaline Phosphatase 61 45 - 120 U/L    AST 18 0 - 40 U/L    ALT 25 0 - 45 U/L   HM1 (CBC with Diff)   Result Value Ref Range    WBC 3.8 (L) 4.0 - 11.0 thou/uL    RBC 3.09 (L) 3.80 - 5.40 mill/uL    Hemoglobin 10.1 (L) 12.0 - 16.0 g/dL    Hematocrit 30.8 (L) 35.0 - 47.0 %     80 - 100 fL    MCH 32.7 27.0 - 34.0 pg    MCHC 32.8 32.0 - 36.0 g/dL    RDW 14.4 11.0 - 14.5 %    Platelets 248 140 - 440 thou/uL    MPV 9.0 8.5 - 12.5 fL    Neutrophils % 72 (H) 50 - 70 %    Lymphocytes % 16 (L) 20 - 40 %    Monocytes % 8 2 - 10 %    Eosinophils % 3 0 - 6 %    Basophils % 1 0 - 2 %    Neutrophils Absolute 2.8 2.0 - 7.7 thou/uL    Lymphocytes Absolute 0.6 (L) 0.8 - 4.4 thou/uL    Monocytes Absolute  0.3 0.0 - 0.9 thou/uL    Eosinophils Absolute 0.1 0.0 - 0.4 thou/uL    Basophils Absolute 0.0 0.0 - 0.2 thou/uL       Imaging    No results found.    Billing  Total face to face time 15 minutes, with 10 minutes of that dedicated to counseling, education or coordination of care.     Signed by: Antonella Paredes

## 2021-06-10 NOTE — PROGRESS NOTES
Pt here for weekly taxol. Port accessed easily and labs obtained and noted. No problem with infusion as directed. Pt denies new complaint. Port flushed and deaccessed upon completion and pt d/c ambulatory to lobby alone. Pt aware of treatment plan.

## 2021-06-10 NOTE — PROGRESS NOTES
Pt amb into clinic for chemo infusion. Reviewed plan of care. Pt premedicated.Pt tolerated infusions well. Pt amb out of clinic.

## 2021-06-11 NOTE — PROGRESS NOTES
[facility] Hematology and Oncology Progress Note    Patient: Janeth Mathias  MRN: [id number]  Date of Service: 09/04/2020        Reason for Visit    Follow-up of right-sided breast cancer.    Assessment and Plan  Cancer Staging  Malignant neoplasm of upper-inner quadrant of right breast in female, estrogen receptor positive (H)  Staging form: Breast, AJCC 8th Edition  - Pathologic stage from 4/28/2020: Stage IB (pT3, pN1a(sn), cM0, G2, ER+, IN+, HER2-, Oncotype DX score: 24) - Signed by Georgia Barrera MD on 4/28/2020      ECOG Performance   ECOG Performance Status: 1    Distress Assessment  Distress Assessment Score: 1: Please see the discussion below.    Pain   : No pain.    Ms. Janeth Mathias is a 55 y.o. woman who noticed an inverted nipple on the right side in July 2019 and a lump in the right breast by November 2019.  The mammogram showed a mass within the right breast at the 12 o'clock position which on imaging was about 5.4 cm in size.  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  She had a bilateral mastectomy and right-sided sentinel lymph node dissection with tissue expander placement done on 3/18/2020.  The posterior margin of the sample on the right side came back positive and so she had to have a reresection done on 3/30/2020.  At that time she had a Port-A-Cath also placed for chemotherapy.     Final pathology showed a 7.5 cm, grade 2, invasive ductal carcinoma, which also had some lymphovascular invasion.  1 of the 3 lymph nodes was positive for a 1.1 cm metastatic focus.  20% of the tumor was a solid type DCIS.  Final stage was pT3, pN1a, cM0.  Estrogen and progesterone receptor strongly positive and HER-2 negative.    The Oncotype DX score came back at 24.  Even though by the score alone this comes in the intermediate range, when we add in the clinical features, especially the size of the cancer and the  lymph node positivity the recurrence risk is significantly high.  Thus we decided to go for adjuvant chemotherapy followed by a radiation oncology consultation and then adjuvant estrogen blockade therapy.  We decided on chemotherapy with dose dense AC for 4 cycles followed by Taxol given once weekly for 12 weeks.  Chemotherapy was started on 5/8/2020.    She has completed 4 cycles of dose dense AC chemotherapy and after that she has completed 9 weeks of weekly Taxol chemotherapy.  She is here for starting the last 3 weeks of Taxol chemotherapy.    1.  Overall she appears to be doing okay.  I think the fatigue is from the successive cycles of chemotherapy that she has received.  Thankfully the chemotherapy will get over in the next 3 weeks.  She feels encouraged to continue and complete the whole course of chemotherapy.  Labs from today were reviewed.  Metabolic panel is quite stable.  Blood counts show a hemoglobin of 10.6, platelet count of 257,000 and an ANC of 1300.  We decided to proceed with chemotherapy today since the blood counts are adequate.    2.  She feels that overall she is coping okay at home.  She continues to follow-up with her mental health provider.  She does not feel that additional help is needed at this time.    3.  I discussed with her that she should pay great attention to the symptoms of neuropathy.  If that is significantly increasing she should let us know.  Otherwise I think we can proceed with the last 2 weeks of chemotherapy after this and then complete chemotherapy.    4.  She is to follow-up with radiation oncology soon after completing chemotherapy to start adjuvant radiation.    5.  We will plan on starting antiestrogen therapy when she is done with radiation.    6.  Follow-up: She will be coming back next week and the week after with labs according to treatment plan for the last 2 weeks of Taxol chemotherapy.  After that she is to follow-up with radiation.    Return to clinic with  MD or NP in 2 months with a CBC differential and platelets and CMP to discuss about adjuvant anti-hormonal therapy.    Time spend >25 minutes face to face with the patient. More than 50 % in counseling and coordination of care.    Problem List    1. Malignant neoplasm of upper-inner quadrant of right breast in female, estrogen receptor positive (H)  CC OFFICE VISIT LONG    HM1(CBC and Differential)    Comprehensive Metabolic Panel   2. Encounter for antineoplastic chemotherapy  CC OFFICE VISIT LONG        CC: Ella Moreno MD Xin Wang, MD       ______________________________________________________________________________    History of Present Illness    Ms. Janeth Mathias is here for follow-up alone.    She states that overall she feels that she is doing okay except that she feels more fatigued.  Nowadays when she is exerting herself she does have some shortness of breath with more than usual exertion.  Because of that she is not able to do much in the way of exercise.  She is still able to carry out all the work that needs to be done at home.    She states that she still has some tingling and numbness in her fingertips and toes but it does not trouble her much.  She feels unsteady when she walks.  She is still able to do fine work without dropping anything.  She is aware of it but it is not really at the point where she can call it painful.    She has intermittent constipation and diarrhea as before which is unchanged.    No fevers or night sweats.  No lumps or bumps anywhere.  No unusual headaches.  No eyesight problems.  No mouth sores.  No swallowing difficulty.  No nausea or vomiting.  No abdominal pain.  No chest pain or palpitation.  No cough.  No difficulty with urination.  No blood in stool or black stools.  No skin rashes.    Please see below.  A 14 point review of system is otherwise completely negative.      Pain Status  Currently in Pain: No/denies    Review of  Systems    Constitutional  Constitutional (WDL): Exceptions to WDL  Fatigue: Fatigue relieved by rest  Fever: None  Chills: None  Weight Gain: 5 - <10% from baseline(1.5 #)  Weight Loss: None  Neurosensory  Ataxia: None  Peripheral Sensory Neuropathy: Asymptomatic, loss of deep tendon reflexes or paresthesia(fingertips and toes)  Syncope: None  Eye   Eye Disorder (WDL): Exceptions to WDL  Blurred Vision: None  Dry Eye: None  Eye Pain: None  Watering Eyes: Intervention not indicated  Ear  Ear Disorder (WDL): All ear disorder elements are within defined limits  Cardiovascular  Cardiovascular (WDL): All cardiovascular elements are within defined limits  Pulmonary  Respiratory (WDL): Exceptions to WDL  Cough: None  Dyspnea: Shortness of breath with moderate exertion  Hypoxia: None  Gastrointestinal  Gastrointestinal (WDL): Exceptions to WDL  Anorexia: None  Constipation: None  Diarrhea: None  Dysphagia: None  Esophagitis: None(on PPI)  Nausea: None  Pharyngitis: None  Vomiting: None  Dysgeusia: Altered taste but no change in diet  Dry Mouth: None  Genitourinary  Genitourinary (WDL): All genitourinary elements are within defined limits  Lymphatic  Lymph (WDL): All lymph disorder elements are within defined limits  Musculoskeletal and Connective Tissue  Musculoskeletal and Connetive Tissue Disorders (WDL): Exceptions to WDL  Arthralgia: Moderate pain, limiting instrumental ADL(bilat knees, stiff. Lt > Rt)  Bone Pain: None  Muscle Weakness : None  Myalgia: Mild pain  Integumentary  Integumentary (WDL): Exceptions to WDL  Alopecia: Hair loss of >50% normal for that individual that is readily apparent to others, a wig or hair piece is necessary if the patient desires to completely camouflage the hair loss, associated with psychosocial impact  Rash Maculo-Papular: Macules/papules covering <10% BSA with or without symptoms (e.g., pruritus, burning, tightness)(Bilat hands, near thumbs)  Pruritus: Mild or localized, topical  intervention indicated  Urticaria: None  Palmar-Plantar Erythrodysesthesia Syndrome: None  Flushing: None  Patient Coping  Patient Coping: Open/discussion  Distress Assessment  Distress Assessment Score: 1  Accompanied by  Accompanied by: Alone  Oral Chemo Adherence         Past History  Past Medical History:   Diagnosis Date     Anxiety      Breast cancer (H)      Carpal tunnel syndrome      Depression      Exercise induced bronchospasm      Insomnia      Migraine      Osteoarthritis of knees, bilateral      Recurrent cold sores      Temporomandibular joint pain 03/1993    Uses a mouthguard.         Past Surgical History:   Procedure Laterality Date     ABDOMINOPLASTY  06/2019     CARPAL TUNNEL RELEASE Right 07/14/2008     COLONOSCOPY  11/01/2017    Repeat in 10 years.     NM SENTINEL NODE INJECTION  3/18/2020     PARTIAL KNEE ARTHROPLASTY Left 07/16/2018     AK BREAST RECONSTRUC W TISS EXPANDR Bilateral 3/18/2020    Procedure: BILATERAL BREAST RECONSTRUCTION WITH TISSUE EXPANDERS;  Surgeon: Sunni Hunter MD;  Location: [facility];  Service: Plastics     AK INSERT TISSUE EXPANDER(S) Right 3/30/2020    Procedure: REVISION RIGHT BREAST RECONSTRUCTION WITH PLACEMENT SUBMUSCULAR  TISSUE EXPANDER;  Surgeon: Sunni Hunter MD;  Location: [facility];  Service: Plastics     AK MASTECTOMY, SIMPLE, COMPLETE Bilateral 3/18/2020    Procedure: Bilateral mastectomies;  Surgeon: Kayla Beaver MD;  Location: [facility] OR;  Service: General     AK MASTECTOMY, SIMPLE, COMPLETE Right 3/30/2020    Procedure: Revision Right Mastectomy;  Surgeon: Kayla Beaver MD;  Location: [facility] OR;  Service: General     REPLACEMENT UNICONDYLAR JOINT KNEE Right 08/14/2019     TONSILLECTOMY AND ADENOIDECTOMY  05/1994     TUBAL LIGATION  1993     TUNNELED VENOUS CATHETER PLACEMENT Left 3/30/2020    Procedure: Port Placement on the Left;  Surgeon: Kayla Beaver MD;  Location: [facility];   Service: General     US BREAST CORE BIOPSY RIGHT Right 2/10/2020       Physical Exam    Recent Vitals 9/4/2020   Weight 190 lbs   BSA (m2) 1.94 m2   /84   Pulse 118   Temp 98.8   Temp src 1   SpO2 98   Some recent data might be hidden       GENERAL: Alert and oriented to time place and person. Seated comfortably. In no distress.  She looks well overall.  She has gained some weight.    HEAD: Atraumatic and normocephalic.    EYES: EMMA, EOMI.  No pallor.  No icterus.    Oral cavity: no mucosal lesion or tonsillar enlargement.    NECK: supple. JVP normal.  No thyroid enlargement.    LYMPH NODES: No palpable, cervical, axillary or inguinal lymphadenopathy.    CHEST: clear to auscultation bilaterally.  Resonant to percussion throughout bilaterally.  Symmetrical breath movements bilaterally.  The Port-A-Cath over the left upper chest looks unremarkable.      CVS: S1 and S2 are heard. Regular rate and rhythm.  No murmur or gallop or rub heard.  No peripheral edema.    ABDOMEN: Soft. Not tender. Not distended.  No palpable hepatomegaly or splenomegaly.  No other mass palpable.  Bowel sounds heard.    EXTREMITIES: Warm.    SKIN: no rash, or bruising or purpura.  She has alopecia.        Lab Results    Recent Results (from the past 168 hour(s))   Comprehensive Metabolic Panel   Result Value Ref Range    Sodium 139 136 - 145 mmol/L    Potassium 3.8 3.5 - 5.0 mmol/L    Chloride 107 98 - 107 mmol/L    CO2 24 22 - 31 mmol/L    Anion Gap, Calculation 8 5 - 18 mmol/L    Glucose 78 70 - 125 mg/dL    BUN 13 8 - 22 mg/dL    Creatinine 0.74 0.60 - 1.10 mg/dL    GFR MDRD Af Amer >60 >60 mL/min/1.73m2    GFR MDRD Non Af Amer >60 >60 mL/min/1.73m2    Bilirubin, Total 0.4 0.0 - 1.0 mg/dL    Calcium 9.2 8.5 - 10.5 mg/dL    Protein, Total 6.4 6.0 - 8.0 g/dL    Albumin 3.7 3.5 - 5.0 g/dL    Alkaline Phosphatase 53 45 - 120 U/L    AST 23 0 - 40 U/L    ALT 35 0 - 45 U/L   HM1 (CBC with Diff)   Result Value Ref Range    WBC 2.5 (L) 4.0 -  11.0 thou/uL    RBC 3.29 (L) 3.80 - 5.40 mill/uL    Hemoglobin 10.6 (L) 12.0 - 16.0 g/dL    Hematocrit 32.5 (L) 35.0 - 47.0 %    MCV 99 80 - 100 fL    MCH 32.2 27.0 - 34.0 pg    MCHC 32.6 32.0 - 36.0 g/dL    RDW 12.9 11.0 - 14.5 %    Platelets 257 140 - 440 thou/uL    MPV 8.9 8.5 - 12.5 fL    Neutrophils % 54 50 - 70 %    Lymphocytes % 26 20 - 40 %    Monocytes % 16 (H) 2 - 10 %    Eosinophils % 3 0 - 6 %    Basophils % 1 0 - 2 %    Immature Granulocyte % 0 <=0 %    Neutrophils Absolute 1.3 (L) 2.0 - 7.7 thou/uL    Lymphocytes Absolute 0.6 (L) 0.8 - 4.4 thou/uL    Monocytes Absolute 0.4 0.0 - 0.9 thou/uL    Eosinophils Absolute 0.1 0.0 - 0.4 thou/uL    Basophils Absolute 0.0 0.0 - 0.2 thou/uL    Immature Granulocyte Absolute 0.0 <=0.0 thou/uL       Imaging    No results found.      Signed by: Georgia Barrera MD

## 2021-06-11 NOTE — PROGRESS NOTES
Pt arrived to infusion clinic. Port accessed with great blood return. Labs reviewed. Pt tolerated Taxol infusion well. Port flushed with Heparin, de-accessed, band-aid applied. Pt ambulated out of infusion clinic independently.

## 2021-06-11 NOTE — TELEPHONE ENCOUNTER
Patient called this morning to report that one of her fingernails is starting to look like there is pus coming from her nailbed and what she can do for that? Also, she noted that her numbness in fingers and toes is becoming worse. She saw her PCP yesterday and is wondering if she can get something prescribed for that or does her PCP have to? She also is questioning when her PORT can be removed?    Brisa Hannah, CMA

## 2021-06-11 NOTE — PROGRESS NOTES
Pt amb into clinic for chemo infusion. Reviewed plan of care. Pt premedicated. Pt tolerated infusion well. Port flushed, de-accessed. Pt amb out of clinic.

## 2021-06-11 NOTE — PROGRESS NOTES
[facility] Hematology and Oncology Progress Note    Patient: Janeth Mathias  MRN: [id number]  Date of Service: 09/28/2020        Reason for Visit:    Patient asked to be seen due to worsening neuropathies post chemotherapy.    Assessment and Plan:    Cancer Staging    1. Malignant neoplasm UIQ (R) female breast    Pathologic Stage IB (pT3, pN1a(sn), cM0)    G2    ER+ @ 95%    MD+ @ 75%    HER2-    Oncotype DX score @ 24    55 y.o.     2019, July - noted an inverted nipple on the right side.    2019, November - noted a lump in the right breast.      02/06/2020 diagnostic (B) mammogram and US showed a 5.4 cm mass within the right breast at the 12 o'clock position.      Core needle biopsy - c/w invasive ductal carcinoma, grade 2, strongly estrogen and progesterone receptor positive and HER-2 negative.      03/18/2020 - bilateral mastectomies and right-sided sentinel lymph node dissection with tissue expanders.      Pathology showed a 7.5 cm, grade 2, invasive ductal carcinoma, which also had some lymphovascular invasion.  1 of the 3 lymph nodes was positive for a 1.1 cm metastatic focus.  20% of the tumor was a solid type DCIS.      The posterior margin of the sample on the right side came back positive.    03/30/2020 - Reresection and [id number] for chemotherapy.    Consult with Dr Barrera - recommended adjuvant chemotherapy with dose dense Adriamycin + Cytoxan for 4 cycles followed by Taxol given once weekly for 12 weeks  followed by a radiation oncology consultation and adjuvant estrogen blockade therapy.    05/08 - 09/18/2020 - completed 4 cycles Adriamycin + Cytoxan followed by 12 weekly doses Taxol adjuvant chemotherapy.    09/28/2020:     Janeth presents stating last week following her final dose weekly Taxol chemotherapy her foot neuropathies significantly increased, manifest as tingliness and pain with excessive walking.  She also notes tingliness in her finger tips.  Otherwise she states she is doing just  fine with resolving fatigue with distance from chemotherapy.      I reviewed the physiology behind her neuropathies and was encouraging to her that they tend to decrease and often resolve with distance from chemotherapy.  I did warn her that this could take 3-6 months, however.  We discussed palliative interventions including acupuncture and various medications including Cymbalta.  After some consideration she would like to try Cymbalta.    Rx 30 mg/day Cymbalta Eprescribed to her pharmacy.  Potential side effects were reviewed.  I discussed that if after 2 weeks of taking 30 mg/day Cymbalta she was not pleased with its impact we could increase the dose to 60 mg/day.  I instructed her to call us in 2 weeks for an update.    10/19/2020 - scheduled to begin adjuvant EBRT.    Follow up scheduled at completion of adjuvant EBRT with CBC and CMP to discuss beginning antiestrogen therapy.    ECOG Performance:     ECOG Performance Status: 0    Distress Assessment:    Distress Assessment Score: No distress    Pain:    Pain Score (Initial OR Reassessment): 7(4/10 with hands, 7/10 with feet)        TT > 20 minutes face to face with > 50 % in counseling and coordination of care.    Neptali Reynaga, CNP      CC: Ella Moreno MD Xin Wang, MD  ______________________________________________________________________    Interim History:    Ms Mathias presents alone.  She states that other than acute exacerbation of her finger tip and feet neuropathies last week, the week following her final weekly dose adjuvant Taxol chemotherapy, she feels that she is doing okay and overall improving since completing chemotherapy. Her neuropathies manifest as tingliness and pain in her feet with excessive walking and tingliness in her finger tips exacerbated with using her computer keyboard.  She has noted resolving fatigue with distance from chemotherapy.  She denies cough, fevers or night sweats, new or concerning lumps or bumps, unusual  headaches, visual or mentation disturbance, mouth sores, nausea or vomiting, abdominal or chest pain, bladder issues, blood in stool or black stools, skin rash.    Pain Status:    Currently in Pain: Yes    Review of Systems:    Constitutional  Constitutional (WDL): Exceptions to WDL  Fatigue: Fatigue not relieved by rest - Limiting instrumental ADL  Neurosensory  Neurosensory (WDL): Exceptions to WDL  Peripheral Motor Neuropathy: Asymptomatic, clinical or diagnostic observations only, intervention not indicated(feet & fingers)  Peripheral Sensory Neuropathy: Asymptomatic, loss of deep tendon reflexes or paresthesia(feet & fingers)  Eye   Eye Disorder (WDL): Exceptions to WDL(wears glasses)  Watering Eyes: Intervention not indicated  Ear  Ear Disorder (WDL): All ear disorder elements are within defined limits  Cardiovascular  Cardiovascular (WDL): All cardiovascular elements are within defined limits  Pulmonary  Respiratory (WDL): Exceptions to WDL  Dyspnea: Shortness of breath with moderate exertion  Gastrointestinal  Gastrointestinal (WDL): Exceptions to WDL  Anorexia: Loss of appetite without alteration in eating habits  Dysgeusia: Altered taste but no change in diet  Genitourinary  Genitourinary (WDL): All genitourinary elements are within defined limits  Lymphatic  Lymph (WDL): All lymph disorder elements are within defined limits  Musculoskeletal and Connective Tissue  Musculoskeletal and Connetive Tissue Disorders (WDL): Exceptions to WDL  Arthralgia: Moderate pain, limiting instrumental ADL  Myalgia: Mild pain  Integumentary  Integumentary (WDL): Exceptions to WDL(blood when blows nose)  Alopecia: Hair loss of >50% normal for that individual that is readily apparent to others, a wig or hair piece is necessary if the patient desires to completely camouflage the hair loss, associated with psychosocial impact  Patient Coping  Patient Coping: Accepting  Distress Assessment  Distress Assessment Score: No  distress  Accompanied by  Accompanied by: Alone  Oral Chemo Adherence       Past History:    Past Medical History:   Diagnosis Date     Anxiety      Breast cancer (H)      Carpal tunnel syndrome      Depression      Exercise induced bronchospasm      Insomnia      Migraine      Osteoarthritis of knees, bilateral      Recurrent cold sores      Temporomandibular joint pain 03/1993    Uses a mouthguard.         Past Surgical History:   Procedure Laterality Date     ABDOMINOPLASTY  06/2019     CARPAL TUNNEL RELEASE Right 07/14/2008     COLONOSCOPY  11/01/2017    Repeat in 10 years.     NM SENTINEL NODE INJECTION  3/18/2020     PARTIAL KNEE ARTHROPLASTY Left 07/16/2018     VA BREAST RECONSTRUC W TISS EXPANDR Bilateral 3/18/2020    Procedure: BILATERAL BREAST RECONSTRUCTION WITH TISSUE EXPANDERS;  Surgeon: Sunni Hunter MD;  Location: [facility];  Service: Plastics     VA INSERT TISSUE EXPANDER(S) Right 3/30/2020    Procedure: REVISION RIGHT BREAST RECONSTRUCTION WITH PLACEMENT SUBMUSCULAR  TISSUE EXPANDER;  Surgeon: Sunni Hunter MD;  Location: [facility];  Service: Plastics     VA MASTECTOMY, SIMPLE, COMPLETE Bilateral 3/18/2020    Procedure: Bilateral mastectomies;  Surgeon: Kayla Beaver MD;  Location: [facility] OR;  Service: General     VA MASTECTOMY, SIMPLE, COMPLETE Right 3/30/2020    Procedure: Revision Right Mastectomy;  Surgeon: Kayla Beaver MD;  Location: [facility];  Service: General     REPLACEMENT UNICONDYLAR JOINT KNEE Right 08/14/2019     TONSILLECTOMY AND ADENOIDECTOMY  05/1994     TUBAL LIGATION  1993     TUNNELED VENOUS CATHETER PLACEMENT Left 3/30/2020    Procedure: Port Placement on the Left;  Surgeon: Kayla Beaver MD;  Location: [facility];  Service: General     US BREAST CORE BIOPSY RIGHT Right 2/10/2020       Physical Exam:    Recent Vitals 9/18/2020   Weight 190 lbs   BSA (m2) 1.94 m2   /82   Pulse 109   Temp 98.6   Temp src 1    SpO2 98   Some recent data might be hidden       GENERAL:   Alert and oriented.  Comfortable.  In no acute distress.      HEENT:   Anicteric sclera.  EMMA.  EOMI.  No pallor.  No mucosal lesions or tonsillar enlargement.    LYMPH NODES:  No palpable cervical, axillary or inguinal lymphadenopathy.    CHEST:   Lungs clear to auscultation bilaterally.    The Port-A-Cath over the left upper chest looks unremarkable and is working well.    CVS:    S1 and S2 aheard.  Regular rate and rhythm.  No murmur, gallop or rub heard.  No peripheral edema.    EXTREMITIES:  Warm.  No edema    SKIN:    No rash, bruising or purpura noted.  Alopecia.    Lab Results:    No results found for this or any previous visit (from the past 168 hour(s)).    Imaging:    No results found.

## 2021-06-11 NOTE — TELEPHONE ENCOUNTER
The port-a-cath was placed by  of surgery.  Please ask scheduling to make an appointment for her to have the Port-A-Cath removed.    Georgia Barrera MD

## 2021-06-11 NOTE — PROCEDURES
Procedures     SIMULATION NOTE:       DIAGNOSIS: Right breast cancer, stage T3 N1 M0, ER/WI positive, HER-2 negative, status post bilateral mastectomy and right sentinel lymph node resection and reexcision due to positive margin followed by adjuvant chemotherapy with 4 cycles of AC and 12 weekly Taxol.    INDICATION:  Postoperative radiation therapy is recommended for patient to further reduce the likelihood of cancer recurrence.    CONSENT:  The possible risks and the side effects of radiation therapy have been discussed with patient in detail and at great length.  Questions are answered to patient's satisfaction.  Written consent was obtained.    SIMULATION:  The patient is in a supine position with a wing board to help keep the same position during the daily radiation therapy.  Tentative isocenter is set up in the center of the thoracic region.  We will acquire CT information to help us to better locate target and design radiation therapy field.    BLOCKS:  Custom blocks will be drawn to minimize radiation to normal tissues and to protect normal organs including, but not limited to, lungs, heart, liver, bone and soft tissue.    DOSAGE:  I plan to give her radiation therapy at 180 cGy each fraction to a total of 5040 cGy in 28 treatments targeted to the right breast and regional lymph nodes using a 4 field technique.  An additional 1000 cGy in 5 fractions will be given to the primary tumor bed using an electron. I will consider to use 3D conformer technique to help us better locate target and to protect normal tissue.      Italia Walton MD, PhD  Department of Radiation Oncology   [facility]  Tel: [phone]  Page: [phone]    [facility]  [address] [place] [address]     [facility]  [address]  [place] [address]

## 2021-06-11 NOTE — TELEPHONE ENCOUNTER
Called Janeth to check in and share the excitement of the anticipated  completion of chemo on 9/18. She is very pleased to finish last cycle of chemo and move on to radiation treatment. She is scheduled to begin radiation on 10/13 at , She lives close to  and is very happy that site will  Be open by the time that she will be scheduled for daily treatments. No needs for navigator today. She was appreciative of the follow up today. She has writer's contact number for future correspondence. Will follow up

## 2021-06-11 NOTE — PROGRESS NOTES
Pt here for f/u and simulation, almost done with chemo. Will get RT at [facility] if possible. Feeling okay. I reviewed the RT education with her and answered her questions.

## 2021-06-11 NOTE — PROGRESS NOTES
Patient is here for three week follow-up of breast cancer. Due for Taxol.   Shannen Haynes RN

## 2021-06-11 NOTE — TELEPHONE ENCOUNTER
Returning a call from [facility] regarding scheduling f/u and Sim w/Dr. Walton.    Pt stated that she only has 2 Chemo left and she would like to schedule her radiation.     lft for pt to c/b [phone] to schedule.

## 2021-06-11 NOTE — TELEPHONE ENCOUNTER
I called patient back and relayed Dr. Christian advisories and patient will come and have follow up visit for worsening neuropathy. She stated that she saw her PCP yesterday and she said that her fingernail was no concern for infection. Patient was advised to watch and soak in epsom salts. Patient will go to urgent care if worsening. Also patient would like PORT removed ASAP. Can you place order for that please? Janeth transferred to scheduling to schedule follow up.  Thanks,  Brisa Hannah, CMA

## 2021-06-11 NOTE — PROCEDURES
Procedures  Clinical Treatment Planning Note    The complex radiotherapy planning will be completed for the patient to plan the treatment for her breast cancer.  The patient had a planning CT earlier today for planning.  The treatment aids were used for planning, including headrest and Wing Board to help keep the same position during the daily radiation therapy.  The therapy planning is necessary to reduce radiotherapy dose to the normal critical organs which are not possible with simple treatment.  In addition, dose to the target and the critical structures requires three-dimensional analysis of the isodose distribution.  The planning will be done to reduce dose to the lungs, spinal cord, liver, and heart.     I will contour the clinical tumor volume  CTV , with expanded volume of planning treatment volume  PTV  on the treatment planning system.  The critical structures will be outlined, including spinal cord, lungs, heart, liver, bone and soft tissue.     Treatment planning will be done on the computer treatment planning system.  The 4-fields will be used to achieve optimal coverage of the target volume.  Dose distribution to the above critical structures will be reviewed.  Isodose distribution along with the X, Y, Z plan will also be reviewed.  Custom blocking will be used to shield normal structures.  The beam s eye views will be reviewed and the digital reconstructed image will be reviewed on the planning software.      The patient will receive 5040 cGy in 28 treatments targeted to the right chest/breast and the regional lymph nodes using 6-MV or 10-MV photons.  An additional 1000 cGy in 5 fractions will be planned to give to the primary tumor bed using electron        Italia Walton MD, PhD  Department of Radiation Oncology   [facility]  Tel: [phone]  Page: [phone]    [facility]  [address] [place] [address]     [place] [id number] [facility] [place] [address]

## 2021-06-11 NOTE — TELEPHONE ENCOUNTER
6778-Parisa from [facility] called on behalf of Dr. Moreno and left message wondering if it is ok with Dr. Barrera that they start patient on a steroid inhaler for exercise induced asthma.  She asked for call back to [phone].    Message sent to Dr. Barrera/ILDEFONSO Tejada RN    2911-Called and left message with Parisa to let her know that steroid inhaler is fine per Dr. Barrera. Instructed her to call me back if further questions/ILDEFONSO Tejada RN

## 2021-06-11 NOTE — PROGRESS NOTES
Patient here ambulatory for follow up on her breast cancer.  Patient wanted to talk about her neuropathies which have gotten worse in hands and feet since completion of chemotherapy 9/18/2020.  Seen by Neptali Reynaga NP

## 2021-06-11 NOTE — TELEPHONE ENCOUNTER
Please have her see urgent care for the pus under the fingernail.  This could be paronychia and she may need antibiotics or the fingernail may even need to be removed.    If she is very worried about the peripheral neuropathy, she can come back and see me or Neptali soon.  We can see what can be done to help with the neuropathy.    Tell her that we can have the Port-A-Cath removed whenever she wants.    Georgia Barrera MD

## 2021-06-11 NOTE — PROGRESS NOTES
[facility] Radiation Oncology Follow Up Note    Patient: Janeth Mathias  MRN: [id number]  Date of Service: 09/15/2020        Ms. Mathias is a 55 y.o. female who has been in her usual state of good health until recently. She noted an inverted nipple on the right side in July 2019.  She had had her annual mammogram in September 2018 which did not show anything suspicious.  She did an Internet search and was reassured that inverted nipples can be normal.  Later on by November she did notice a small lump in her right breast.  She then sought medical care and the mammogram followed by ultrasound on 2/6/2020 showed a 5.4 x 1.8 x 4.6 cm lesion involving the right breast at the 12 o'clock position highly suspicious for malignancy.  There is no associated abnormal right axilla lymph no adenopathy.  She underwent ultrasound-guided needle biopsy on 2/10/2020 and pathology confirmed invasive breast cancer, ER/WI receptors positive and HER-2 negative. When she saw Dr. Beaver, the mass was noted to be large clinically and so it was decided to go for a bilateral mastectomy with right sentinel lymph node biopsy and tissue expander placement, which was done on 3/18/2020.  The pathology showed a 7.5 cm invasive ductal carcinoma, grade 2 with associated DCIS.  The margins was positive for invasive carcinoma and DCIS at posterior margin.  1 of 3 removed sentinel lymph node showed evidence of metastatic disease with the largest metastatic focus measuring 11 x 3 mm with no evidence of extranodal extension. Patient had a reexcision surgery on 3/30/2020 which showed no evidence of residual disease.  Postoperatively she has been recovering well.  She was seen and evaluated initially on 7/16/2020 and postop radiation therapy was recommended.  Patient then received adjuvant chemotherapy with 4 cycles of AC and 12 weekly Taxol.  Her last chemotherapy is planned to be given on 9/18/2020.  The patient is here for evaluation and consideration of  postop radiation therapy.         CHEMOTHERAPY HISTORY: Concurrent Chemotherapy: No     RADIATION THERAPY HISTORY: Prior Radiation: No     IMPLANTED CARDIAC DEVICE: none      PREGNANCY: The patient is informed not to be pregnant during the radiation therapy.  She is post menopausal.      Impression      Right breast cancer, stage T3 N1 M0, ER/NE positive, HER-2 negative, status post bilateral mastectomy and right sentinel lymph node resection and reexcision due to positive margin followed by adjuvant chemotherapy with 4 cycles of AC and 12 weekly Taxol.     Assessment & Plan:      I have personally reviewed her upcoming medical record today.  I have also discussed with the patient about all the possible treatment options for breast cancer including surgery, systemic therapy, and the radiation therapy.  The possible risks and side effects of radiation therapy has been discussed with the patient in detail and at the great lengths.  Questions are answered to patient satisfaction.  The NCCN guideline of breast cancer treatment has also been reviewed with the patient.  I agree the patient is good candidate and indicated for postop radiation therapy for her breast cancer.  I believe the patient can be potentially benefited by radiation therapy for local control and possibly a better survival.  Therefore radiation therapy is offered to the patient and she is willing to proceed being aware of potential risks and side effects involved.  She is scheduled to return to radiation oncology later on today for simulation.  I plan to give radiation therapy at 180 cGy each fraction to a total of 5040 cGy in 28 treatments targeted to the right breast and regional lymph nodes using a 4 field technique followed by possibly additional 1000 Gy in 5 fractions boost to the primary tumor bed.  Her radiation therapy is planned to start on 10/19/2020.     Again, thank you very much for the referral and allowing me to participate in the care of  this patient.  If you have any questions or concerns about this consultation, please do not hesitate to call.  I spent approximately 25 minutes today with the patient and 80% time was used for counseling.       Italia Walton MD, PhD  Department of Radiation Oncology   [facility]  Tel: [phone]  Page: [phone]    [facility]  [address] [place] [address]     [address]   [place] [address]    CC:  Patient Care Team:  Ella Moreno MD as PCP - General (Family Medicine)  Georgia Barrera MD as Physician (Hematology and Oncology)  Neptali Reynaga CNP as Nurse Practitioner (Hematology and Oncology)  Christina Shirley RN as Oncology Nurse Navigator (Hematology and Oncology)  Italia Walton MD as Physician (Radiation Oncology)  Kayla Beaver MD as Physician (General Surgery)

## 2021-06-12 NOTE — PROGRESS NOTES
Patient is here for one month follow-up of breast cancer. Currently receiving EBRT.   Shannen Haynes RN

## 2021-06-12 NOTE — PROGRESS NOTES
RADIATION ONCOLOGY WEEKLY TREATMENT VISIT NOTE      Assessment / Impression       1. Malignant neoplasm of upper-inner quadrant of right breast in female, estrogen receptor positive (H)        Tolerating radiation therapy well.  All questions and concerns addressed.    Plan:     Continue radiation treatment as prescribed.    Subjective:      HPI: Janeth Mathias is a 55 y.o. female with    1. Malignant neoplasm of upper-inner quadrant of right breast in female, estrogen receptor positive (H)         The following portions of the patient's history were reviewed and updated as appropriate: allergies, current medications, past family history, past medical history, past social history, past surgical history and problem list.    Assessment                  Body Site: Breast                           Site: Right CW/SCL  Stereotactic Radiosurgery: No  Today's Dose: 1260  Total Dose for Breast: 6040  Today's Fraction/Total Fraction Breast:   Drainage: 0: Absent                                            Sexuality Alteration                 Emotional Alteration Copin: Effective  Comfort Alteration KPS: 90% Can perform normal activity, minor signs of disease  Fatigue (ONS scale) : 2: Mild Fatigue  Pain Location: denies  Hot Flashes and/or Flushes: 1: Mild or no more than 1 per day   Nutrition Alteration Anorexia: 0: None  Nausea: 0: None  Vomitin: None  Skin Alteration Skin Sensation: 0: No problem  Skin Reaction: 0: None  AUA Assessment                                  Accompanied by       Objective:     Exam: Examination reviewed no significant changes.    Vitals:    10/27/20 1054   Weight: 191 lb 8 oz (86.9 kg)       Wt Readings from Last 8 Encounters:   10/27/20 191 lb 8 oz (86.9 kg)   10/20/20 193 lb 1.6 oz (87.6 kg)   10/09/20 189 lb (85.7 kg)   20 196 lb 14.4 oz (89.3 kg)   20 190 lb (86.2 kg)   20 190 lb (86.2 kg)   20 190 lb (86.2 kg)   20 188 lb 6.4 oz (85.5 kg)        General: Alert and oriented, in no acute distress  Janeth has no Erythema.  Aria chart and setup information reviewed    Italai Walton MD

## 2021-06-12 NOTE — PROGRESS NOTES
RADIATION ONCOLOGY WEEKLY TREATMENT VISIT NOTE      Assessment / Impression       1. Malignant neoplasm of upper-inner quadrant of right breast in female, estrogen receptor positive (H)       Tolerating radiation therapy well.  All questions and concerns addressed.    Plan:     Continue radiation treatment as prescribed.    Subjective:      HPI: Janeth Mathias is a 55 y.o. female with    1. Malignant neoplasm of upper-inner quadrant of right breast in female, estrogen receptor positive (H)         The following portions of the patient's history were reviewed and updated as appropriate: allergies, current medications, past family history, past medical history, past social history, past surgical history and problem list.    Assessment                  Body Site: Breast                           Site: Right CW/SCL  Stereotactic Radiosurgery: No  Today's Dose: 360  Total Dose for Breast: 6040  Today's Fraction/Total Fraction Breast:   Drainage: 0: Absent                                            Sexuality Alteration                 Emotional Alteration Copin: Effective  Comfort Alteration KPS: 90% Can perform normal activity, minor signs of disease  Fatigue (ONS scale) : 2: Mild Fatigue(recovering from chemotherapy)  Pain Location: denies  Hot Flashes and/or Flushes: 1: Mild or no more than 1 per day   Nutrition Alteration Anorexia: 0: None  Nausea: 0: None  Vomitin: None  Skin Alteration Skin Sensation: 0: No problem  Skin Reaction: 0: None(radiaplex given w/instructions)  AUA Assessment                                  Accompanied by       Objective:     Exam: Examination reviewed no significant changes.    Vitals:    10/20/20 1103   BP: 122/84   Pulse: 82   Temp: 98.2  F (36.8  C)   TempSrc: Oral   SpO2: 99%   Weight: 193 lb 1.6 oz (87.6 kg)       Wt Readings from Last 8 Encounters:   10/20/20 193 lb 1.6 oz (87.6 kg)   10/09/20 189 lb (85.7 kg)   20 196 lb 14.4 oz (89.3 kg)   20  190 lb (86.2 kg)   09/11/20 190 lb (86.2 kg)   09/04/20 190 lb (86.2 kg)   08/14/20 188 lb 6.4 oz (85.5 kg)   07/31/20 186 lb (84.4 kg)       General: Alert and oriented, in no acute distress  Janeth has no Erythema.  Aria chart and setup information reviewed    Italia Walton MD

## 2021-06-12 NOTE — PROGRESS NOTES
RADIATION ONCOLOGY WEEKLY TREATMENT VISIT NOTE      Assessment / Impression       1. Malignant neoplasm of upper-inner quadrant of right breast in female, estrogen receptor positive (H)        Tolerating radiation therapy well.  All questions and concerns addressed.    Plan:     Continue radiation treatment as prescribed.    Subjective:      HPI: Janeth Mathias is a 55 y.o. female with    1. Malignant neoplasm of upper-inner quadrant of right breast in female, estrogen receptor positive (H)         The following portions of the patient's history were reviewed and updated as appropriate: allergies, current medications, past family history, past medical history, past social history, past surgical history and problem list.    Assessment                  Body Site: Breast                           Site: Right CW/SCL  Stereotactic Radiosurgery: No  Today's Dose: 2160  Total Dose for Breast: 6040  Today's Fraction/Total Fraction Breast:   Drainage: 0: Absent                                            Sexuality Alteration                 Emotional Alteration Copin: Effective  Comfort Alteration KPS: 90% Can perform normal activity, minor signs of disease  Fatigue (ONS scale) : 4: Moderate Fatigue  Pain Location: denies  Hot Flashes and/or Flushes: 1: Mild or no more than 1 per day   Nutrition Alteration Anorexia: 0: None  Nausea: 0: None  Vomitin: None  Skin Alteration Skin Sensation: 0: No problem  Skin Reaction: 1: Faint erythema or dry desquamation  AUA Assessment                                  Accompanied by       Objective:     Exam:  mild Erythema.    Vitals:    20 1449   Weight: 192 lb 12.8 oz (87.5 kg)       Wt Readings from Last 8 Encounters:   20 192 lb 12.8 oz (87.5 kg)   10/27/20 191 lb 8 oz (86.9 kg)   10/20/20 193 lb 1.6 oz (87.6 kg)   10/09/20 189 lb (85.7 kg)   20 196 lb 14.4 oz (89.3 kg)   20 190 lb (86.2 kg)   20 190 lb (86.2 kg)   20 190 lb  (86.2 kg)       General: Alert and oriented, in no acute distress  Janeth has mild Erythema.  Aria chart and setup information reviewed    Italia Walton MD

## 2021-06-12 NOTE — PROGRESS NOTES
[facility] Hematology and Oncology Progress Note    Patient: Janeth Mathias  MRN: [id number]  Date of Service: 11/06/2020        Reason for Visit    Follow-up of right-sided breast cancer.    Assessment and Plan  Cancer Staging  Malignant neoplasm of upper-inner quadrant of right breast in female, estrogen receptor positive (H)  Staging form: Breast, AJCC 8th Edition  - Pathologic stage from 4/28/2020: Stage IB (pT3, pN1a(sn), cM0, G2, ER+, KS+, HER2-, Oncotype DX score: 24) - Signed by Georgia Barrera MD on 4/28/2020      ECOG Performance   ECOG Performance Status: 1    Distress Assessment  Distress Assessment Score: 1: Please see the discussion below.    Pain   : No pain.    Ms. Janeth Mathias is a 55 y.o. woman who noticed an inverted nipple on the right side in July 2019 and a lump in the right breast by November 2019.  The mammogram showed a mass within the right breast at the 12 o'clock position which on imaging was about 5.4 cm in size.  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  She had a bilateral mastectomy and right-sided sentinel lymph node dissection with tissue expander placement done on 3/18/2020.  The posterior margin of the sample on the right side came back positive and so she had to have a reresection done on 3/30/2020.  At that time she had a Port-A-Cath also placed for chemotherapy.     Final pathology showed a 7.5 cm, grade 2, invasive ductal carcinoma, which also had some lymphovascular invasion.  1 of the 3 lymph nodes was positive for a 1.1 cm metastatic focus.  20% of the tumor was a solid type DCIS.  Final stage was pT3, pN1a, cM0.  Estrogen and progesterone receptor strongly positive and HER-2 negative.    The Oncotype DX score came back at 24.  Even though by the score alone this comes in the intermediate range, when we add in the clinical features, especially the size of the cancer and the  lymph node positivity the recurrence risk is significantly high.  Thus we decided to go for adjuvant chemotherapy followed by a radiation oncology consultation and then adjuvant estrogen blockade therapy.  We decided on chemotherapy with dose dense AC for 4 cycles followed by Taxol given once weekly for 12 weeks.  Chemotherapy was started on 5/8/2020.  She completed the whole course of chemotherapy on 9/18/2020 with the 12th dose of Taxol.    Today she appears to have recovered substantially from the chemotherapy.  Hair is growing back.  Energy is good.  Peripheral neuropathy symptoms are getting better.  Blood counts are mostly recovered.  Metabolic panel is also normal.    1.  She is now undergoing adjuvant radiation for the breast cancer and overall she is tolerating it well.  However she does have some redness and mild dilatation of the skin over the right breast where she is having radiation.  We discussed about how to manage the radiation dermatitis.  I asked her to continue to use the lotion given her by the radiation oncology department at least 2 or 3 times a day topically.  After she is done with that lotion, she should continue with the moisturizing lotion of her choice.  She should continue in this manner for a few more months after radiation to that the skin healed fully.  She voiced understanding.    2.  She does have some peripheral neuropathy symptoms left over after the chemotherapy.  However this appears to be slowly improving and the symptoms are now fairly mild.  She feels that the Cymbalta tablets are working.  She needs a new prescription.  I am sending a prescription for Cymbalta 30 mg tablets, 90 tablets with 1 refill.  We can stop when the peripheral neuropathy symptoms are mostly gone.  It appears to be getting better day by day.  I think it will likely be mostly gone in about 6 months.    3.  We then discussed about further treatment of the breast cancer to prevent recurrence after  completion of adjuvant radiation.  She will need estrogen blockade.  Since that she is postmenopausal, we will go her on aromatase inhibitor.  I discussed with her about how aromatase inhibitors works.  We had a very good discussion about the possible side effects of aromatase inhibitors.  We discussed about both the common side effects like hot flashes, body aches, stomach upset etc.  We discussed about the possibility of occasionally having liver enzyme elevations or blood count changes.  We discussed about the occasional patient who has problems with elevated blood pressure levels.  We also discussed about long-term side effects like the small risk of uterine cancers, slightly increased risk of blood clots and the possibility of developing osteoporosis down the line.  I have given her printed information about anastrozole to read from the [employer] database.  After thorough discussion she agreed to start it.  I discussed with her that the standard is to continue daily for at least 5 years.  She should start the day after completion of radiation.  She voiced understanding.  I am sending a prescription for anastrozole 1 mg tablets for 90 days with 3 refills to her pharmacy.  She will start the day after completion of radiation.    4.  To reduce the risk of her having blood clot complications, I encouraged her to continue with the baby aspirin that she is already taking.  We discussed about how to prevent osteoporosis by taking calcium and vitamin D tablets to 3 times a day and increasing dairy product intake.  I encouraged her to start exercising by going for a walk daily for weightbearing exercise.  We will plan to do a DEXA scan in 2 years time to monitor for bone loss.    5.  We discussed about the long-term follow-up plan for breast cancer.  He will not need a mammogram because she had a bilateral mastectomy.      6.  Follow-up: Return to clinic with MD or NP in the second half of December to see how she is doing  on Arimidex.  We will check a CBC differential platelets and LFTs at that time.  If she is doing well at that time, I think we can go to 3 monthly follow-up.    Time spend >30 minutes face to face with the patient. More than 50 % in counseling and coordination of care.    Problem List    1. Malignant neoplasm of upper-inner quadrant of right breast in female, estrogen receptor positive (H)  HM1(CBC and Differential)    Hepatic Profile    anastrozole (ARIMIDEX) 1 mg tablet    aspirin 81 MG EC tablet    calcium-vitamin D (CALCIUM-VITAMIN D) 500 mg(1,250mg) -200 unit per tablet   2. Chemotherapy-induced peripheral neuropathy (H)  JIC RED    DULoxetine (CYMBALTA) 30 MG capsule   3. Radiation dermatitis          CC: Ella Moreno MD Xin Wang, MD       ______________________________________________________________________________    History of Present Illness    Ms. Janeth Mathias is here for follow-up alone.    She states that she is going through with radiation and is about half-way through.  She expects that radiation will get over on December 3.  She does feel a bit tired with radiation.  She is also starting to have some redness and mild irritation on the skin of the right breast in the field of radiation but no pain or skin breakdown.  She is applying the lotion given to her from the radiation oncology department and she feels that is working well.    What is left over for her after chemotherapy is some peripheral neuropathy.  At this time she states that she has had a bit of tingling in her fingertips and some mild numbness and tingling in the tips of her toes.  She has started on Cymbalta at 30 mg.  She has been using it for a month and she feels that it is helping quite a bit.  At this time she has no functional problems because of the peripheral neuropathy.  She feels it is getting better.    She also has some nail dystrophy.  She is dealing with that by applying lacquer.    She has had her seasonal flu  vaccine.    She states that she did have some exacerbation of her exercise-induced asthma but she is now on a steroid inhaler and doing a lot better.    No fevers or night sweats.  No lumps or bumps anywhere.  No unusual headaches.  No eyesight problems.  No mouth sores.  No swallowing difficulty.  No nausea or vomiting.  She states that she is eating well but sometimes she has a feeling of early satiety.  On the other hand she is not losing any weight.  Breathing is fine.  No chest pain or cough.  No abdominal pain.  No constipation and no diarrhea.  No blood in stool or black stools.  No skin rashes.    Please see below.  A 14 point review of system is otherwise completely negative.        Pain Status  Currently in Pain: No/denies    Review of Systems    Constitutional  Constitutional (WDL): Exceptions to WDL  Fatigue: Fatigue relieved by rest  Fever: None  Chills: None  Weight Gain: None  Weight Loss: None  Neurosensory  Neurosensory (WDL): Exceptions to WDL  Peripheral Motor Neuropathy: Asymptomatic, clinical or diagnostic observations only, intervention not indicated  Ataxia: None  Peripheral Sensory Neuropathy: Asymptomatic, loss of deep tendon reflexes or paresthesia(hands and feet, improving)  Confusion: None  Syncope: None  Eye   Eye Disorder (WDL): Exceptions to WDL(wears glasses)  Blurred Vision: Intervention not indicated  Dry Eye: None  Eye Pain: None  Watering Eyes: None  Ear  Ear Disorder (WDL): All ear disorder elements are within defined limits  Cardiovascular  Cardiovascular (WDL): All cardiovascular elements are within defined limits  Pulmonary  Respiratory (WDL): Exceptions to WDL(hx asthma)  Cough: Mild symptoms, nonprescription intervention indicated  Dyspnea: Shortness of breath with moderate exertion  Hypoxia: None  Gastrointestinal  Gastrointestinal (WDL): Exceptions to WDL  Anorexia: Loss of appetite without alteration in eating habits(feels full easily)  Constipation: Occasional or  intermittent symptoms, occasional use of stool softeners, laxatives, dietary modification, or enema  Diarrhea: None  Dysphagia: None  Esophagitis: None  Nausea: None  Pharyngitis: None  Vomiting: None  Dysgeusia: None  Dry Mouth: None  Genitourinary  Genitourinary (WDL): All genitourinary elements are within defined limits  Lymphatic  Lymph (WDL): Exceptions to WDL  Lymph Node Discomfort: Yes  Lymph Node Discomfort Location: Rt axillary-radiation field  Musculoskeletal and Connective Tissue  Musculoskeletal and Connetive Tissue Disorders (WDL): Exceptions to WDL  Arthralgia: Mild pain(stiff)  Bone Pain: None  Muscle Weakness : None  Myalgia: None  Integumentary  Integumentary (WDL): Exceptions to WDL(pink in radiation field)  Alopecia: Hair loss of up to 50% of normal for that individual that is not obvious from a distance but only on close inspection, a different hair style may be required to cover the hair loss but it does not require a wig or hair piece to camouflage  Rash Maculo-Papular: None  Pruritus: None  Urticaria: None  Palmar-Plantar Erythrodysesthesia Syndrome: None  Flushing: None  Patient Coping  Patient Coping: Accepting  Distress Assessment  Distress Assessment Score: 1  Accompanied by     Oral Chemo Adherence         Past History  Past Medical History:   Diagnosis Date     Anxiety      Asthma      Breast cancer (H)      Carpal tunnel syndrome      Depression      Exercise induced bronchospasm      Insomnia      Migraine      Osteoarthritis of knees, bilateral      Recurrent cold sores      Shortness of breath      Temporomandibular joint pain 03/1993    Uses a mouthguard.         Past Surgical History:   Procedure Laterality Date     ABDOMINOPLASTY  06/2019     CARPAL TUNNEL RELEASE Right 07/14/2008     COLONOSCOPY  11/01/2017    Repeat in 10 years.     NM SENTINEL NODE INJECTION  3/18/2020     PARTIAL KNEE ARTHROPLASTY Left 07/16/2018     AL BREAST RECONSTRUC W TISS EXPANDR Bilateral 3/18/2020     Procedure: BILATERAL BREAST RECONSTRUCTION WITH TISSUE EXPANDERS;  Surgeon: Sunni Hunter MD;  Location: [facility] OR;  Service: Plastics     AZ INSERT TISSUE EXPANDER(S) Right 3/30/2020    Procedure: REVISION RIGHT BREAST RECONSTRUCTION WITH PLACEMENT SUBMUSCULAR  TISSUE EXPANDER;  Surgeon: Sunni Hunter MD;  Location: [facility] OR;  Service: Plastics     AZ MASTECTOMY, SIMPLE, COMPLETE Bilateral 3/18/2020    Procedure: Bilateral mastectomies;  Surgeon: Kayla Beaver MD;  Location: [facility] OR;  Service: General     AZ MASTECTOMY, SIMPLE, COMPLETE Right 3/30/2020    Procedure: Revision Right Mastectomy;  Surgeon: Kayla Beaver MD;  [place] [facility] [place]: General     AZ RMVL LIZABETH CTR VAD W/SUBQ PORT/ CTR/PRPH INSJ N/A 10/9/2020    Procedure: Port Removal;  Surgeon: Kayla Beaver MD;  [facility]: [facility];  Service: General     REPLACEMENT UNICONDYLAR JOINT KNEE Right 08/14/2019     TONSILLECTOMY AND ADENOIDECTOMY  05/1994     TUBAL LIGATION  1993     TUNNELED VENOUS CATHETER PLACEMENT Left 3/30/2020    Procedure: Port Placement on the Left;  Surgeon: Kayla Beaver MD;  [place] [facility] [place]  [place]: General     US BREAST CORE BIOPSY RIGHT Right 2/10/2020       Physical Exam    Recent Vitals 11/6/2020   Height -   Weight 191 lbs 2 oz   BSA (m2) 1.95 m2   /82   Pulse 112   Temp 98.8   Temp src 1   SpO2 98   Some recent data might be hidden       GENERAL: Alert and oriented to time place and person. Seated comfortably. In no distress.  She looks well.  She has put on some weight.  In good spirits.    HEAD: Atraumatic and normocephalic.    EYES: EMMA, EOMI.  No pallor.  No icterus.    Oral cavity: no mucosal lesion or tonsillar enlargement.    NECK: supple. JVP normal.  No thyroid enlargement.    LYMPH NODES: No palpable, cervical, axillary or inguinal lymphadenopathy.    CHEST: clear to auscultation bilaterally.  Resonant to  percussion throughout bilaterally.  Symmetrical breath movements bilaterally.    She has some redness of the skin and an irritated look of the skin in the area of radiation over her right breast but no skin breakdown or scaling.    CVS: S1 and S2 are heard. Regular rate and rhythm.  No murmur or gallop or rub heard.  No peripheral edema.    ABDOMEN: Soft. Not tender. Not distended.  No palpable hepatomegaly or splenomegaly.  No other mass palpable.  Bowel sounds heard.    EXTREMITIES: Warm.    SKIN: no rash, or bruising or purpura.  Short hair is growing back all over the scalp.          Lab Results    Recent Results (from the past 168 hour(s))   Comprehensive Metabolic Panel   Result Value Ref Range    Sodium 140 136 - 145 mmol/L    Potassium 4.0 3.5 - 5.0 mmol/L    Chloride 105 98 - 107 mmol/L    CO2 24 22 - 31 mmol/L    Anion Gap, Calculation 11 5 - 18 mmol/L    Glucose 98 70 - 125 mg/dL    BUN 13 8 - 22 mg/dL    Creatinine 0.86 0.60 - 1.10 mg/dL    GFR MDRD Af Amer >60 >60 mL/min/1.73m2    GFR MDRD Non Af Amer >60 >60 mL/min/1.73m2    Bilirubin, Total 0.4 0.0 - 1.0 mg/dL    Calcium 9.3 8.5 - 10.5 mg/dL    Protein, Total 6.6 6.0 - 8.0 g/dL    Albumin 3.9 3.5 - 5.0 g/dL    Alkaline Phosphatase 67 45 - 120 U/L    AST 15 0 - 40 U/L    ALT 16 0 - 45 U/L   HM1 (CBC with Diff)   Result Value Ref Range    WBC 3.0 (L) 4.0 - 11.0 thou/uL    RBC 4.19 3.80 - 5.40 mill/uL    Hemoglobin 12.7 12.0 - 16.0 g/dL    Hematocrit 38.3 35.0 - 47.0 %    MCV 91 80 - 100 fL    MCH 30.3 27.0 - 34.0 pg    MCHC 33.2 32.0 - 36.0 g/dL    RDW 12.4 11.0 - 14.5 %    Platelets 190 140 - 440 thou/uL    MPV 8.9 8.5 - 12.5 fL    Neutrophils % 70 50 - 70 %    Lymphocytes % 15 (L) 20 - 40 %    Monocytes % 10 2 - 10 %    Eosinophils % 5 0 - 6 %    Basophils % 0 0 - 2 %    Immature Granulocyte % 0 <=0 %    Neutrophils Absolute 2.1 2.0 - 7.7 thou/uL    Lymphocytes Absolute 0.4 (L) 0.8 - 4.4 thou/uL    Monocytes Absolute 0.3 0.0 - 0.9 thou/uL     Eosinophils Absolute 0.1 0.0 - 0.4 thou/uL    Basophils Absolute 0.0 0.0 - 0.2 thou/uL    Immature Granulocyte Absolute 0.0 <=0.0 thou/uL       Imaging    No results found.      Signed by: Georgia Barrera MD

## 2021-06-12 NOTE — TELEPHONE ENCOUNTER
Called Janeth to check in. Her first couple treatments of radiation have gone well and is very appreciatve that [facility] has reopened as it is only about 6 minutes from her work. She is scheduled to complete her radiation early December and reconstructive surgery is anticipated in mid January. She is hoping by  March to be able to take a trip with her girlfriends; something to look forward to. Support and encouragement given. She has writer's contact information for future reference, calls invited. Will follow up after radiation completion.

## 2021-06-13 NOTE — PROGRESS NOTES
RADIATION ONCOLOGY WEEKLY TREATMENT VISIT NOTE      Assessment / Impression       1. Malignant neoplasm of upper-inner quadrant of right breast in female, estrogen receptor positive (H)  oxyCODONE-acetaminophen (PERCOCET/ENDOCET) 5-325 mg per tablet     Tolerating radiation therapy well with significant skin erythema.  All questions and concerns addressed.    Plan:     Offered patient to have a few days break before completion of radiation therapy.  The patient felt comfortable at this time and wished to continue radiation treatment as prescribed.    Discussed with the patient about proper skin care during therapy.    A prescription of Percocet has been given to the patient for symptom control.    Subjective:      HPI: Janeth Mathias is a 55 y.o. female with    1. Malignant neoplasm of upper-inner quadrant of right breast in female, estrogen receptor positive (H)  oxyCODONE-acetaminophen (PERCOCET/ENDOCET) 5-325 mg per tablet       The following portions of the patient's history were reviewed and updated as appropriate: allergies, current medications, past family history, past medical history, past social history, past surgical history and problem list.    Assessment                  Body Site: Breast                           Site: Right CW/SCL  Stereotactic Radiosurgery: No  Today's Dose: 5640  Total Dose for Breast: 6040  Today's Fraction/Total Fraction Breast: 31/33  Drainage: 0: Absent                                            Sexuality Alteration                 Emotional Alteration Copin: Effective  Comfort Alteration KPS: 90% Can perform normal activity, minor signs of disease  Fatigue (ONS scale) : 8: Extreme Fatigue  Pain Location: right axilla  Pain Intensity. Rate degree of pain ranging from 0 (no pain) to 10 (severe pain) : 10  Pain Description: Burning - Burning, hot, fire type pain  Pain Intervention: 2: Nonsteroidal anti-inflammatory agents or non-opiods  Effectiveness of pain  intervention: 2: Pain relieved 50%  Hot Flashes and/or Flushes: 1: Mild or no more than 1 per day   Nutrition Alteration Anorexia: 0: None  Nausea: 0: None  Vomitin: None  Skin Alteration Skin Sensation: 2: Burning;3: Painful  Skin Reaction: 3: Dry desquamation with or without erythema  AUA Assessment                                  Accompanied by       Objective:     Exam: moderate, severe Erythema.    Vitals:    20 1436   BP: 142/77   Pulse: 91   Temp: 98.3  F (36.8  C)   TempSrc: Oral   SpO2: 98%   Weight: 194 lb 6.4 oz (88.2 kg)       Wt Readings from Last 8 Encounters:   20 194 lb 6.4 oz (88.2 kg)   20 193 lb 8 oz (87.8 kg)   20 192 lb 4.8 oz (87.2 kg)   11/10/20 193 lb 11.2 oz (87.9 kg)   20 191 lb 1.6 oz (86.7 kg)   20 192 lb 12.8 oz (87.5 kg)   10/27/20 191 lb 8 oz (86.9 kg)   10/20/20 193 lb 1.6 oz (87.6 kg)       General: Alert and oriented, in no acute distress  Janeth has moderate, severe Erythema.  Aria chart and setup information reviewed    Italia Walton MD

## 2021-06-13 NOTE — PROGRESS NOTES
RADIATION ONCOLOGY WEEKLY TREATMENT VISIT NOTE      Assessment / Impression       1. Malignant neoplasm of upper-inner quadrant of right breast in female, estrogen receptor positive (H)     2. Radiation dermatitis       Cancer Staging  Malignant neoplasm of upper-inner quadrant of right breast in female, estrogen receptor positive (H)  Staging form: Breast, AJCC 8th Edition  - Pathologic stage from 4/28/2020: Stage IB (pT3, pN1a(sn), cM0, G2, ER+, AL+, HER2-, Oncotype DX score: 24) - Signed by Georgia Barrera MD on 4/28/2020     Tolerating radiation therapy well.  All questions and concerns addressed.    Grade 2 radiation dermatitis with increasing pain, approximately 1 cm area of desquamation in the axilla without signs of infection    Plan:     Continue radiation treatment as prescribed.  Radiation: Site: Right CW/SCL  Stereotactic Radiosurgery: No  Stereotactic Radiosurgery: No  Today's Dose: 5240  Total Dose for Breast: 6040  Today's Fraction/Total Fraction Breast: 29/33    Boost field started today so should see improving dermatitis of the breast related to radiation and within the next week or 2.    Continue current skin care with radial Plex, Aquaphor, Mepilex.    Discussed can take ibuprofen and Tylenol alternating and as needed    Can use cool compresses over the chest wall but would avoid use of ice    Can use aloe vera over the chest wall but would follow with application of Aquaphor    Discussed possibility of Silvadene if increasing desquamation but currently not necessary    Subjective:      HPI: Janeth Mathias is a 55 y.o. female with    1. Malignant neoplasm of upper-inner quadrant of right breast in female, estrogen receptor positive (H)     2. Radiation dermatitis       She is seen today as requested due to increasing skin irritation and discomfort.  Is been using Tylenol and tried ibuprofen after prior OTV this week.  She is wondering what else she can do for the discomfort.    The following  portions of the patient's history were reviewed and updated as appropriate: allergies, current medications, past family history, past medical history, past social history, past surgical history and problem list.    Assessment                  Body Site: Breast                           Site: Right CW/SCL  Stereotactic Radiosurgery: No  Today's Dose: 5240  Total Dose for Breast: 6040  Today's Fraction/Total Fraction Breast:   Drainage: 0: Absent                                            Sexuality Alteration                 Emotional Alteration Copin: Effective  Comfort Alteration KPS: 90% Can perform normal activity, minor signs of disease  Fatigue (ONS scale) : 5: Moderate Fatigue  Pain Location: right axilla  Pain Intensity. Rate degree of pain ranging from 0 (no pain) to 10 (severe pain) : 10  Pain Description: Burning - Burning, hot, fire type pain  Pain Intervention: 2: Nonsteroidal anti-inflammatory agents or non-opiods  Effectiveness of pain intervention: 2: Pain relieved 50%  Hot Flashes and/or Flushes: 1: Mild or no more than 1 per day   Nutrition Alteration Anorexia: 0: None  Nausea: 0: None  Vomitin: None  Skin Alteration Skin Sensation: 2: Burning  Skin Reaction: 3: Dry desquamation with or without erythema  AUA Assessment                                  Accompanied by       Objective:     Exam:     There were no vitals filed for this visit.    Wt Readings from Last 8 Encounters:   20 193 lb 8 oz (87.8 kg)   20 192 lb 4.8 oz (87.2 kg)   11/10/20 193 lb 11.2 oz (87.9 kg)   20 191 lb 1.6 oz (86.7 kg)   20 192 lb 12.8 oz (87.5 kg)   10/27/20 191 lb 8 oz (86.9 kg)   10/20/20 193 lb 1.6 oz (87.6 kg)   10/09/20 189 lb (85.7 kg)       General: Alert and oriented, in no acute distress  Janeth has moderate Erythema.  She has desquamation largest area about 1 cm diameter in the axilla.  Underlying new skin, clean, with no evidence of infection.    Treatment Summary  to Date    Aria chart and setup information reviewed    Becca Calvo MD

## 2021-06-13 NOTE — PROGRESS NOTES
RADIATION ONCOLOGY WEEKLY TREATMENT VISIT NOTE      Assessment / Impression       1. Malignant neoplasm of upper-inner quadrant of right breast in female, estrogen receptor positive (H)       Cancer Staging  Malignant neoplasm of upper-inner quadrant of right breast in female, estrogen receptor positive (H)  Staging form: Breast, AJCC 8th Edition  - Pathologic stage from 4/28/2020: Stage IB (pT3, pN1a(sn), cM0, G2, ER+, MT+, HER2-, Oncotype DX score: 24) - Signed by Georgia Barrera MD on 4/28/2020     Tolerating radiation therapy well.  All questions and concerns addressed.  Grade 2 radiation dermatitis   grade 1 fatigue    Plan:     Continue radiation treatment as prescribed.  Radiation: Site: Right CW/SCL  Stereotactic Radiosurgery: No  Stereotactic Radiosurgery: No  Today's Dose: 4860  Total Dose for Breast: 6040  Today's Fraction/Total Fraction Breast: 27/33    Continue current skin care given additional radiaplex and Mepilex dressings    Subjective:      HPI: Janeth Mathias is a 55 y.o. female with    1. Malignant neoplasm of upper-inner quadrant of right breast in female, estrogen receptor positive (H)       She is tolerating radiation with increasing skin irritation and peeling primarily in the low axilla.  She has some pruritus over the sternum.  She had more irritation in the inframammary fold however this is improved.  She continues to use radiaplex and gel dressings along with Aquaphor over any open areas for skin care.  Fatigue is manageable.    The following portions of the patient's history were reviewed and updated as appropriate: allergies, current medications, past family history, past medical history, past social history, past surgical history and problem list.    Assessment                  Body Site: Breast                           Site: Right CW/SCL  Stereotactic Radiosurgery: No  Today's Dose: 4860  Total Dose for Breast: 6040  Today's Fraction/Total Fraction Breast: 27/33  Drainage: 0:  Absent                                            Sexuality Alteration                 Emotional Alteration Copin: Effective  Comfort Alteration KPS: 90% Can perform normal activity, minor signs of disease  Fatigue (ONS scale) : 5: Moderate Fatigue  Pain Location: right axilla, shoulder  Pain Intensity. Rate degree of pain ranging from 0 (no pain) to 10 (severe pain) : 0-7  Pain Description: Combination - A combination of pain descriptions (note)(burning & aching)  Pain Intervention: 2: Nonsteroidal anti-inflammatory agents or non-opiods  Effectiveness of pain intervention: 2: Pain relieved 50%  Hot Flashes and/or Flushes: 1: Mild or no more than 1 per day   Nutrition Alteration Anorexia: 0: None  Nausea: 0: None  Vomitin: None  Skin Alteration Skin Sensation: 2: Burning  Skin Reaction: 3: Dry desquamation with or without erythema  AUA Assessment                                  Accompanied by       Objective:     Exam:     Vitals:    20 1438   BP: 138/81   Pulse: 95   Temp: 98.1  F (36.7  C)   TempSrc: Oral   SpO2: 98%   Weight: 193 lb 8 oz (87.8 kg)       Wt Readings from Last 8 Encounters:   20 193 lb 8 oz (87.8 kg)   20 192 lb 4.8 oz (87.2 kg)   11/10/20 193 lb 11.2 oz (87.9 kg)   20 191 lb 1.6 oz (86.7 kg)   20 192 lb 12.8 oz (87.5 kg)   10/27/20 191 lb 8 oz (86.9 kg)   10/20/20 193 lb 1.6 oz (87.6 kg)   10/09/20 189 lb (85.7 kg)       General: Alert and oriented, in no acute distress  Janeth has moderate Erythema.  Moderate erythema over the chest wall with desquamation in the low axilla and beginning just infraclavicular.  No desquamation in the inframammary fold.  Incision well-healed.  No no swelling upper extremity    Treatment Summary to Date    Aria chart and setup information reviewed    Becca Calvo MD

## 2021-06-13 NOTE — PROGRESS NOTES
[facility] Hematology and Oncology Progress Note    Patient: Janeth Mathias  MRN: [id number]  Date of Service: 12/18/2020        Reason for Visit:    Scheduled follow up.    Assessment and Plan:    Cancer Staging    1. Malignant neoplasm UIQ (R) female breast    Pathologic Stage IB (pT3, pN1a(sn), cM0)    G2    ER+ @ 95%    SD+ @ 75%    HER2-    Oncotype DX score @ 24    55 y.o.     2019, July - noted an inverted nipple on the right side.    2019, November - noted a lump in the right breast.      02/06/2020 diagnostic (B) mammogram and US showed a 5.4 cm mass within the right breast at the 12 o'clock position.      Core needle biopsy - c/w invasive ductal carcinoma, grade 2, strongly estrogen and progesterone receptor positive and HER-2 negative.      03/18/2020 - bilateral mastectomies and right-sided sentinel lymph node dissection with tissue expanders.      Pathology showed a 7.5 cm, grade 2, invasive ductal carcinoma, which also had some lymphovascular invasion.  1 of the 3 lymph nodes was positive for a 1.1 cm metastatic focus.  20% of the tumor was a solid type DCIS.      The posterior margin of the sample on the right side came back positive.    03/30/2020 - Reresection and [id number] for chemotherapy.    Consult with Dr Barrera - recommended adjuvant chemotherapy with dose dense Adriamycin + Cytoxan for 4 cycles followed by Taxol given once weekly for 12 weeks  followed by a radiation oncology consultation and adjuvant estrogen blockade therapy.    05/08 - 09/18/2020 - completed 4 cycles Adriamycin + Cytoxan followed by 12 weekly doses Taxol adjuvant chemotherapy.    12/03/2020 - completed 6040 cGy / 33 fx adjuvant EBRT.    12/04/2020 - started adjuvant aromatase inhibitor (AI) therapy with anastrozole, 1 mg/day.    12/18/2020:     CBC - mild leukopenia.  ANC, Plts and HgB WNL.    LFTs - WNL.    Janeth presents alone in good spirits.  Overall she is doing really well.  No known side effects from AI  therapy.  She had baseline mild hot flashes and arthralgias prior to starting AI therapy and they have not changed.  Her radiation dermatitis has healed nicely. She continues with daily skin hydration with various creams.  Her foot neuropathies continue to decrease, manifest as tingliness and pain with excessive walking.  The tingliness in her finger tips has mostly resolved.  She continues on 30 mg/day Cymbalta.  Her fatigue has also resolved with distance from chemotherapy.  She has a history of vertigo treated with Meclazine at one time - the symptoms still show up on a much lesser degree from time to time.    Tolerating adjuvant AI therapy very well:    Hematologies and LFTs all very acceptable.    Continue anastrozole, 1 mg/day anticipating a minimum of 5 years adjuvant endocrine therapy.     She ran out of baby ASA - encouraged to resume prophylactic for thromboses on AI therapy.    Her neuropathies continue to decrease with distance from chemotherapy.  I reviewed that often they can take up to 6 months to completely resolve and I beleive in her case they will.  Discussed discontinuing Cymbalta when neuropathies resolve.    Radiation dermatitis - healing nicely.    Continue lifelong daily topical moisturizing creams.    Continue self chest wall examinations.    03/18/2021 - 3-month follow up with examination and CBC to assess resolve of mild leukopenia.    ECOG Performance:     ECOG Performance Status: 1    Distress Assessment:    Distress Assessment Score: 3    Pain:             TT > 25 minutes face to face with > 50 % in counseling and coordination of care.    Neptali Reynaga, CNP      CC: Ella Moreno MD Xin Wang, MD  ______________________________________________________________________    Interim History:    Ms Mathias presents alone in good spirits.  Overall she is doing really well.  No known side effects from AI therapy.  She had baseline mild hot flashes and arthralgias prior to starting AI  therapy and they have not changed.  Her radiation dermatitis has healed nicely. She continues with daily skin hydration with various creams.  Her foot neuropathies continue to decrease, manifest as tingliness and pain with excessive walking.  The tingliness in her finger tips has mostly resolved.  She continues on 30 mg/day Cymbalta.  Her fatigue has also resolved with distance from chemotherapy.  She has a history of vertigo treated with Meclazine at one time - the symptoms still show up on a much lesser degree from time to time.  She denies cough, fevers or night sweats, new or concerning lumps or bumps, unusual headaches, visual or mentation disturbance, mouth sores, nausea or vomiting, abdominal or chest pain, bladder issues, blood in stool or black stools, skin rash.    Pain Status:    Currently in Pain: No/denies    Review of Systems:    Constitutional  Constitutional (WDL): Exceptions to WDL  Fatigue: Concerns  Neurosensory  Neurosensory (WDL): Exceptions to WDL  Peripheral Sensory Neuropathy: Concerns(improving in hands and toes)  Dizziness: Concerns(vertigo has worsened)  Eye   Eye Disorder (WDL): All eye disorder elements are within defined limits  Ear  Ear Disorder (WDL): All ear disorder elements are within defined limits  Cardiovascular  Cardiovascular (WDL): All cardiovascular elements are within defined limits  Pulmonary  Respiratory (WDL): Exceptions to WDL  Dyspnea: Concerns(due to asthma)  Gastrointestinal  Gastrointestinal (WDL): All gastrointestinal elements are within defined limits  Genitourinary  Genitourinary (WDL): All genitourinary elements are within defined limits  Lymphatic  Lymph (WDL): All lymph disorder elements are within defined limits  Musculoskeletal and Connective Tissue  Musculoskeletal and Connetive Tissue Disorders (WDL): All Musculoskeletal and Connetive Tissue Disorder elements are within defined limits  Integumentary  Integumentary (WDL): All integumentary elements are  within defined limits  Patient Coping  Patient Coping: Accepting;Open/discussion  Accompanied by  Accompanied by: Alone  Oral Chemo Adherence       Past History:    Past Medical History:   Diagnosis Date     Anxiety      Asthma      Breast cancer (H)      Carpal tunnel syndrome      Depression      Exercise induced bronchospasm      Insomnia      Migraine      Osteoarthritis of knees, bilateral      Recurrent cold sores      Shortness of breath      Temporomandibular joint pain 03/1993    Uses a mouthguard.         Past Surgical History:   Procedure Laterality Date     ABDOMINOPLASTY  06/2019     CARPAL TUNNEL RELEASE Right 07/14/2008     COLONOSCOPY  11/01/2017    Repeat in 10 years.     NM SENTINEL NODE INJECTION  3/18/2020     PARTIAL KNEE ARTHROPLASTY Left 07/16/2018     FL BREAST RECONSTRUC W TISS EXPANDR Bilateral 3/18/2020    Procedure: BILATERAL BREAST RECONSTRUCTION WITH TISSUE EXPANDERS;  Surgeon: Sunni Hunter MD;  Location: [facility];  Service: Plastics     FL INSERT TISSUE EXPANDER(S) Right 3/30/2020    Procedure: REVISION RIGHT BREAST RECONSTRUCTION WITH PLACEMENT SUBMUSCULAR  TISSUE EXPANDER;  Surgeon: Sunni Hunter MD;  Location: [facility];  Service: Plastics     FL MASTECTOMY, SIMPLE, COMPLETE Bilateral 3/18/2020    Procedure: Bilateral mastectomies;  Surgeon: Kayla Beaver MD;  Location: [facility] OR;  Service: General     FL MASTECTOMY, SIMPLE, COMPLETE Right 3/30/2020    Procedure: Revision Right Mastectomy;  Surgeon: Kayla Beaver MD;  Location: [facility];  Service: General     FL RMVL LIZABETH CTR VAD W/SUBQ PORT/ CTR/PRPH INSJ N/A 10/9/2020    Procedure: Port Removal;  Surgeon: Kayla Beaver MD;  Location: [facility];  Service: General     REPLACEMENT UNICONDYLAR JOINT KNEE Right 08/14/2019     TONSILLECTOMY AND ADENOIDECTOMY  05/1994     TUBAL LIGATION  1993     TUNNELED VENOUS CATHETER PLACEMENT Left 3/30/2020    Procedure: Port  Placement on the Left;  Surgeon: Kayla Beaver MD;  Location: [facility];  Service: General     US BREAST CORE BIOPSY RIGHT Right 2/10/2020       Physical Exam:    Recent Vitals 12/18/2020   Weight 191 lbs 8 oz   BSA (m2) 1.95 m2   /85   Pulse 116   Temp 98.6   Temp src 1   SpO2 96   Some recent data might be hidden       GENERAL:   Alert and oriented.  Comfortable.  In no acute distress.      HEENT:   Anicteric sclera.  EMMA.  EOMI.  No pallor.  No mucosal lesions or tonsillar enlargement.    LYMPH NODES:  No palpable cervical, axillary or inguinal lymphadenopathy.    CHEST:   Lungs clear to auscultation bilaterally.    BREASTS:  C/W bilateral mastectomies with implants.  No concerning lumps or bumps or skin changes.  (L) with resolving radiation dermatitis and hyperpigmentation.  (B) negative axillas.     Patient declined female  during exam.    CVS:    S1 and S2 aheard.  Regular rate and rhythm.  No murmur, gallop or rub heard.  No peripheral edema.    ABDOMEN:  Soft.  Not tender.  Not distended.  No palpable organomegaly, masses or ascites.    EXTREMITIES:  Warm.  No edema    SKIN:    No rash, bruising or purpura noted.  Alopecia.    Lab Results:    Reviewed with patient.    Recent Results (from the past 168 hour(s))   Hepatic Profile   Result Value Ref Range    Bilirubin, Total 0.5 0.0 - 1.0 mg/dL    Bilirubin, Direct 0.1 <=0.5 mg/dL    Protein, Total 6.9 6.0 - 8.0 g/dL    Albumin 3.9 3.5 - 5.0 g/dL    Alkaline Phosphatase 71 45 - 120 U/L    AST 18 0 - 40 U/L    ALT 21 0 - 45 U/L   HM1 (CBC with Diff)   Result Value Ref Range    WBC 3.4 (L) 4.0 - 11.0 thou/uL    RBC 4.25 3.80 - 5.40 mill/uL    Hemoglobin 12.5 12.0 - 16.0 g/dL    Hematocrit 38.4 35.0 - 47.0 %    MCV 90 80 - 100 fL    MCH 29.4 27.0 - 34.0 pg    MCHC 32.6 32.0 - 36.0 g/dL    RDW 12.2 11.0 - 14.5 %    Platelets 196 140 - 440 thou/uL    MPV 8.8 8.5 - 12.5 fL    Neutrophils % 67 50 - 70 %    Lymphocytes % 15 (L) 20 - 40 %     Monocytes % 12 (H) 2 - 10 %    Eosinophils % 4 0 - 6 %    Basophils % 1 0 - 2 %    Immature Granulocyte % 0 <=0 %    Neutrophils Absolute 2.3 2.0 - 7.7 thou/uL    Lymphocytes Absolute 0.5 (L) 0.8 - 4.4 thou/uL    Monocytes Absolute 0.4 0.0 - 0.9 thou/uL    Eosinophils Absolute 0.2 0.0 - 0.4 thou/uL    Basophils Absolute 0.0 0.0 - 0.2 thou/uL    Immature Granulocyte Absolute 0.0 <=0.0 thou/uL       Imaging:    No results found.

## 2021-06-13 NOTE — TELEPHONE ENCOUNTER
Called Janeth to check in. She relayed that she is now feeling back to her baseline and that she has recovered from the fatigue related from chemo and radiation. She stated that she is optimistic as she moves toward the new year and feels very blessed to have her surgery and treatments behind her. She feels that she is tolerating AI well. She expressed thanks for the support this past year. Support and encouragement provided. She has writer's contact information for future reference and calls invited anytime.. Will follow up in the future.

## 2021-06-13 NOTE — PATIENT INSTRUCTIONS - HE
Recent Results (from the past 24 hour(s))   Hepatic Profile   Result Value Ref Range    Bilirubin, Total 0.5 0.0 - 1.0 mg/dL    Bilirubin, Direct 0.1 <=0.5 mg/dL    Protein, Total 6.9 6.0 - 8.0 g/dL    Albumin 3.9 3.5 - 5.0 g/dL    Alkaline Phosphatase 71 45 - 120 U/L    AST 18 0 - 40 U/L    ALT 21 0 - 45 U/L   HM1 (CBC with Diff)   Result Value Ref Range    WBC 3.4 (L) 4.0 - 11.0 thou/uL    RBC 4.25 3.80 - 5.40 mill/uL    Hemoglobin 12.5 12.0 - 16.0 g/dL    Hematocrit 38.4 35.0 - 47.0 %    MCV 90 80 - 100 fL    MCH 29.4 27.0 - 34.0 pg    MCHC 32.6 32.0 - 36.0 g/dL    RDW 12.2 11.0 - 14.5 %    Platelets 196 140 - 440 thou/uL    MPV 8.8 8.5 - 12.5 fL    Neutrophils % 67 50 - 70 %    Lymphocytes % 15 (L) 20 - 40 %    Monocytes % 12 (H) 2 - 10 %    Eosinophils % 4 0 - 6 %    Basophils % 1 0 - 2 %    Immature Granulocyte % 0 <=0 %    Neutrophils Absolute 2.3 2.0 - 7.7 thou/uL    Lymphocytes Absolute 0.5 (L) 0.8 - 4.4 thou/uL    Monocytes Absolute 0.4 0.0 - 0.9 thou/uL    Eosinophils Absolute 0.2 0.0 - 0.4 thou/uL    Basophils Absolute 0.0 0.0 - 0.2 thou/uL    Immature Granulocyte Absolute 0.0 <=0.0 thou/uL

## 2021-06-13 NOTE — PROGRESS NOTES
Patient is here for labs and provider for Malignant neoplasm of upper-inner quadrant of right breast in female, estrogen receptor positive (H).

## 2021-06-13 NOTE — PROGRESS NOTES
Radiation Treatment Summary    Patient: Janeth Mathias   MRN: [id number]  : 1965  Care Provider: Italia Walton    Date of Service: 2020        Georgia Barrera MD  [facility]  [address] [place] [id number]               Dear Dr. Barrera:     Your patient Mrs. Janeth Mathias completed her radiation therapy on 12/3/2020. As you know Ms. Mathias is a 55 y.o. female with a diagnosis of right breast cancer, stage T3 N1 M0, ER/NM positive, HER-2 negative, status post bilateral mastectomy and right sentinel lymph node resection and reexcision due to positive margin followed by adjuvant chemotherapy with 4 cycles of AC and 12 weekly Taxol.  She received postop radiation therapy for her breast cancer with a total dose of 5040 cGy in 28 treatments targeted to the right breast and the regional lymph nodes followed by additional 1000 Gy in 5 fractions to the primary tumor bed using electron.  Her radiation therapy was given from 10/19/2020-12/3/2020.  She tolerated radiation therapy reasonably well with expected acute side effect.  The patient is scheduled to return to radiation oncology in 4 weeks for routine post therapy office follow-up.    Again, thank you very much for the referral and allowing me to participate in the care of this patient.  If you have any questions or concerns about this consultation, please do not hesitate to call.            Sincerely,      Italia Walton MD, PhD  Department of Radiation Oncology   [facility]  Tel: [phone]  Page: [phone]    [facility]  [address] [place], [place] [id number] [place] [id number]    CC:  Patient Care Team:  Ella Moreno MD as PCP - General (Family Medicine)  Georgia Barrera MD as Physician (Hematology and Oncology)  Neptali Reynaga CNP as Nurse Practitioner (Hematology and Oncology)  Christina Shirley, RN as Oncology Nurse Navigator (Hematology and Oncology)  Italia Walton MD as Physician (Radiation  Oncology)  Kayla Beaver MD as Physician (General Surgery)  Italia Walton MD as Assigned Cancer Care Provider

## 2021-06-13 NOTE — PROGRESS NOTES
RADIATION ONCOLOGY WEEKLY TREATMENT VISIT NOTE      Assessment / Impression       1. Malignant neoplasm of upper-inner quadrant of right breast in female, estrogen receptor positive (H)        Tolerating radiation therapy well.  All questions and concerns addressed.    Plan:     Continue radiation treatment as prescribed.    Discussed with the patient about skin care.    Subjective:      HPI: Janeth aMthias is a 55 y.o. female with    1. Malignant neoplasm of upper-inner quadrant of right breast in female, estrogen receptor positive (H)         The following portions of the patient's history were reviewed and updated as appropriate: allergies, current medications, past family history, past medical history, past social history, past surgical history and problem list.    Assessment                  Body Site: Breast                           Site: Right CW/SCL  Stereotactic Radiosurgery: No  Today's Dose: 3960  Total Dose for Breast: 6040  Today's Fraction/Total Fraction Breast:   Drainage: 0: Absent                                            Sexuality Alteration                 Emotional Alteration Copin: Effective  Comfort Alteration KPS: 90% Can perform normal activity, minor signs of disease  Fatigue (ONS scale) : 5: Moderate Fatigue  Pain Location: R shoulder  Pain Intensity. Rate degree of pain ranging from 0 (no pain) to 10 (severe pain) : 2  Pain Description: Ache - Muscular type ache  Pain Intervention: 2: Nonsteroidal anti-inflammatory agents or non-opiods  Hot Flashes and/or Flushes: 1: Mild or no more than 1 per day   Nutrition Alteration Anorexia: 0: None  Nausea: 0: None  Vomitin: None  Skin Alteration Skin Sensation: 0: No problem  Skin Reaction: 2: Bright erythema(Mepilex and aquafor given)  AUA Assessment                                  Accompanied by       Objective:     Exam: moderate, severe Erythema.    Vitals:    20 1438   Weight: 192 lb 4.8 oz (87.2 kg)       Wt  Readings from Last 8 Encounters:   11/17/20 192 lb 4.8 oz (87.2 kg)   11/10/20 193 lb 11.2 oz (87.9 kg)   11/06/20 191 lb 1.6 oz (86.7 kg)   11/03/20 192 lb 12.8 oz (87.5 kg)   10/27/20 191 lb 8 oz (86.9 kg)   10/20/20 193 lb 1.6 oz (87.6 kg)   10/09/20 189 lb (85.7 kg)   09/28/20 196 lb 14.4 oz (89.3 kg)       General: Alert and oriented, in no acute distress  Janeth has moderate, severe Erythema.  Aria chart and setup information reviewed    Italia Walton MD

## 2021-06-13 NOTE — PROGRESS NOTES
Patient having more burning pain in axilla past couple days.  Using mepilex lite sheets with some relief to reduce friction to area.  Gave additional radiaplex gel to use.  Also gave patient radiacare gel sheets to try to cool and sooth area with instructions.

## 2021-06-13 NOTE — PROGRESS NOTES
Patient here ambulatory for final radiation therapy treatment for her breast cancer.  Patient has bright erythema and dry desquamation in the right axilla.  Reinforced skin cares.  Written discharge instructions reviewed and given to patient.  Follow up with Dr. Walton in about 4 to 6 weeks with a telephone visit.  Patient verbalized understanding and left ambulatory with appointments.

## 2021-06-13 NOTE — PROGRESS NOTES
RADIATION ONCOLOGY WEEKLY TREATMENT VISIT NOTE      Assessment / Impression       1. Malignant neoplasm of upper-inner quadrant of right breast in female, estrogen receptor positive (H)        Tolerating radiation therapy well.  All questions and concerns addressed.    Plan:     Continue radiation treatment as prescribed.    Discussed with the patient about a skin care.    Subjective:      HPI: Janeth Mathias is a 55 y.o. female with    1. Malignant neoplasm of upper-inner quadrant of right breast in female, estrogen receptor positive (H)         The following portions of the patient's history were reviewed and updated as appropriate: allergies, current medications, past family history, past medical history, past social history, past surgical history and problem list.    Assessment                  Body Site: Breast                           Site: Right CW/SCL  Stereotactic Radiosurgery: No  Today's Dose: 3060  Total Dose for Breast: 6040  Today's Fraction/Total Fraction Breast:   Drainage: 0: Absent                                            Sexuality Alteration                 Emotional Alteration Copin: Effective  Comfort Alteration KPS: 80% Can perform normal activity with effort, some signs of disease  Fatigue (ONS scale) : 8: Extreme Fatigue  Pain Location: R shoulder  Pain Intensity. Rate degree of pain ranging from 0 (no pain) to 10 (severe pain) : 2  Pain Description: Ache - Muscular type ache  Pain Intervention: 0: None(encouraged OTC analgesics PRN)  Hot Flashes and/or Flushes: 1: Mild or no more than 1 per day   Nutrition Alteration Anorexia: 0: None  Nausea: 0: None  Vomitin: None  Skin Alteration Skin Sensation: 0: No problem  Skin Reaction: 2: Bright erythema  AUA Assessment                                  Accompanied by       Objective:     Exam: mild Erythema.    Vitals:    11/10/20 1445   Weight: 193 lb 11.2 oz (87.9 kg)       Wt Readings from Last 8 Encounters:   11/10/20  193 lb 11.2 oz (87.9 kg)   11/06/20 191 lb 1.6 oz (86.7 kg)   11/03/20 192 lb 12.8 oz (87.5 kg)   10/27/20 191 lb 8 oz (86.9 kg)   10/20/20 193 lb 1.6 oz (87.6 kg)   10/09/20 189 lb (85.7 kg)   09/28/20 196 lb 14.4 oz (89.3 kg)   09/18/20 190 lb (86.2 kg)       General: Alert and oriented, in no acute distress  Janeth has mild Erythema.  Aria chart and setup information reviewed    Italia Walton MD

## 2021-06-13 NOTE — TELEPHONE ENCOUNTER
Called patient for routine follow up call after radiation for her breast cancer.  Patient states her skin is healing well and she is having much less pain now.  Continues with her skin cares as instructed.  Confirmed follow up appointments and told patient to call with any questions or concerns.

## 2021-06-14 NOTE — PROGRESS NOTES
[facility] Radiation Oncology Follow Up     Patient: Janeth Mathias  MRN: [id number]  Date of Service: 01/05/2021       DISEASE TREATED:  Right breast cancer, stage T3 N1 M0, ER/FL positive, HER-2 negative, status post bilateral mastectomy and right sentinel lymph node resection and reexcision due to positive margin followed by adjuvant chemotherapy with 4 cycles of AC and 12 weekly Taxol.      TYPE OF RADIATION THERAPY ADMINISTERED:  5040 cGy in 28 treatments targeted to the right breast and the regional lymph nodes followed by additional 1000 Gy in 5 fractions to the primary tumor bed using electron.  Her radiation therapy was given from 10/19/2020-12/3/2020.     INTERVAL SINCE COMPLETION OF RADIATION THERAPY: 1 month.      SUBJECTIVE:  Ms. Mathias is a 55 y.o. female who has been in her usual state of good health until recently. She noted an inverted nipple on the right side in July 2019.  She had had her annual mammogram in September 2018 which did not show anything suspicious.  She did an Internet search and was reassured that inverted nipples can be normal.  Later on by November she did notice a small lump in her right breast.  She then sought medical care and the mammogram followed by ultrasound on 2/6/2020 showed a 5.4 x 1.8 x 4.6 cm lesion involving the right breast at the 12 o'clock position highly suspicious for malignancy.  There is no associated abnormal right axilla lymph no adenopathy.  She underwent ultrasound-guided needle biopsy on 2/10/2020 and pathology confirmed invasive breast cancer, ER/FL receptors positive and HER-2 negative. When she saw Dr. Beaver, the mass was noted to be large clinically and so it was decided to go for a bilateral mastectomy with right sentinel lymph node biopsy and tissue expander placement, which was done on 3/18/2020.  The pathology showed a 7.5 cm invasive ductal carcinoma, grade 2 with associated DCIS.  The margins was positive for invasive carcinoma and DCIS at posterior  margin.  1 of 3 removed sentinel lymph node showed evidence of metastatic disease with the largest metastatic focus measuring 11 x 3 mm with no evidence of extranodal extension.  Postoperatively she has been recovering well.  The patient received adjuvant chemotherapy with 4 cycles of AC and 12 weekly Taxol. She received postop radiation therapy for her breast cancer with a total dose of 5040 cGy in 28 treatments targeted to the right breast and the regional lymph nodes followed by additional 1000 Gy in 5 fractions to the primary tumor bed using electron.  Her radiation therapy was given from 10/19/2020-12/3/2020.  She tolerated radiation therapy reasonably well with expected acute side effect. The patient has been recovering well since completion of the radiation therapy.  She denies any pain discomfort at the time of evaluation patient is here for routine post therapy telephone office follow-up.    Medications were reviewed and are up to date on EPIC.    The following portions of the patient's history were reviewed and updated as appropriate: allergies, current medications, past family history, past medical history, past social history, past surgical history and problem list.    Review of Systems:      General  Constitutional (WDL): Exceptions to WDL  Fatigue: Fatigue relieved by rest  Hot flashes/Night Sweats: Mild symptoms, no intervention needed  EENT  Eye Disorder (WDL): All eye disorder elements are within defined limits(wears glasses)  Ear Disorder (WDL): All ear disorder elements are within defined limits  Respiratory   Respiratory (WDL): Exceptions to WDL  Dyspnea: Shortness of breath with moderate exertion  Cardiovascular  Cardiovascular (WDL): All cardiovascular elements are within defined limits  Endocrine     Gastrointestinal  Gastrointestinal (WDL): Exceptions to WDL  Anorexia: Loss of appetite without alteration in eating habits  Dysgeusia: None  Musculoskeletal  Musculoskeletal and Connetive Tissue  Disorders (WDL): All Musculoskeletal and Connetive Tissue Disorder elements are within defined limits  Integumentary               Integumentary (WDL): Exceptions to WDL(radiation dermatitis improving)  Neurological  Neurosensory (WDL): Exceptions to WDL  Peripheral Motor Neuropathy: Asymptomatic, clinical or diagnostic observations only, intervention not indicated(feet/toes)  Ataxia: Asymptomatic, clinical or diagnostic observations only, intervention not indicated  Peripheral Sensory Neuropathy: Asymptomatic, loss of deep tendon reflexes or paresthesia(feet/toes)  Dizziness: Mild unsteadiness or sensation of movement(vertigo occasionally)  Psychological/Emotional   Patient Coping: Accepting;Open/discussion  Hematological/Lymphatic  Lymph (WDL): All lymph disorder elements are within defined limits  Dermatologic     Genitourinary/Reproductive  Genitourinary (WDL): All genitourinary elements are within defined limits  Reproductive     Pain              Currently in Pain: No/denies  Accompanied by  Accompanied by: Alone       Impression     The patient is a 55-year-old female with a diagnosis of right breast cancer, status post surgery, adjuvant chemotherapy and postop radiation therapy.  She completed her radiation therapy 1 month ago and has been recovering well.  Patient is currently receiving hormone therapy.    Assessment & Plan:     1.  Continue long-term follow-up and ongoing care for her breast cancer with Dr. Barrera, med oncology as planned.    2.  Follow-up with radiation oncology as needed.      Face to face time  15 minutes with > 100% spent on consultation, education and coordination of care.    Italia Walton MD, PhD  Department of Radiation Oncology   [facility]  Tel: [phone]  Page: [phone]    [facility]  [address]  [place] [address]     [place] [id number] [place] [address]    CC:  Patient Care Team:  Ella Moreno MD as PCP - General (Family  Medicine)  Georgia Barrera MD as Physician (Hematology and Oncology)  Neptali Reynaga CNP as Nurse Practitioner (Hematology and Oncology)  Christina Shirley RN as Oncology Nurse Navigator (Hematology and Oncology)  Italia Walton MD as Physician (Radiation Oncology)  Kayla Beaver MD as Physician (General Surgery)  Italia Walton MD as Assigned Cancer Care Provider

## 2021-06-14 NOTE — PROGRESS NOTES
"Janeth Mathias is a 55 y.o. female who is being evaluated via a billable telephone visit.      The patient has been notified of following:     \"This telephone visit will be conducted via a call between you and your physician/provider. We have found that certain health care needs can be provided without the need for a physical exam.  This service lets us provide the care you need with a short phone conversation.  If a prescription is necessary we can send it directly to your pharmacy.  If lab work is needed we can place an order for that and you can then stop by our lab to have the test done at a later time.    Telephone visits are billed at different rates depending on your insurance coverage. During this emergency period, for some insurers they may be billed the same as an in-person visit.  Please reach out to your insurance provider with any questions.    If during the course of the call the physician/provider feels a telephone visit is not appropriate, you will not be charged for this service.\"    Patient has given verbal consent to a Telephone visit? Yes    What phone number would you like to be contacted at? [phone]    Patient would like to receive their AVS by AVS Preference: [place].    NURSING NOTES:  Called patient for telephone follow up visit for her breast cancer.  Patient states she is doing much better and skin is healing well.  Started her hormone therapy.  Talked by Dr. Walton.  Plan RTC for follow up as directed by physician.    Phone call duration: 10 minutes nursing time.    Dana Boucher RN    "

## 2021-06-16 PROBLEM — Z17.0 MALIGNANT NEOPLASM OF UPPER-INNER QUADRANT OF RIGHT BREAST IN FEMALE, ESTROGEN RECEPTOR POSITIVE (H): Status: ACTIVE | Noted: 2020-02-26

## 2021-06-16 PROBLEM — H81.10 BENIGN PAROXYSMAL POSITIONAL VERTIGO, UNSPECIFIED LATERALITY: Status: ACTIVE | Noted: 2021-04-09

## 2021-06-16 PROBLEM — T45.1X5A CHEMOTHERAPY-INDUCED PERIPHERAL NEUROPATHY (H): Status: ACTIVE | Noted: 2020-08-14

## 2021-06-16 PROBLEM — C50.211 MALIGNANT NEOPLASM OF UPPER-INNER QUADRANT OF RIGHT BREAST IN FEMALE, ESTROGEN RECEPTOR POSITIVE (H): Status: ACTIVE | Noted: 2020-02-26

## 2021-06-16 PROBLEM — G62.0 CHEMOTHERAPY-INDUCED PERIPHERAL NEUROPATHY (H): Status: ACTIVE | Noted: 2020-08-14

## 2021-06-16 NOTE — PROGRESS NOTES
[facility] Hematology and Oncology Progress Note    Patient: Janeth Mathias  MRN: [id number]  Date of Service: 04/09/2021        Reason for Visit    Follow-up of right-sided breast cancer.    Assessment and Plan  Cancer Staging  Malignant neoplasm of upper-inner quadrant of right breast in female, estrogen receptor positive (H)  Staging form: Breast, AJCC 8th Edition  - Pathologic stage from 4/28/2020: Stage IB (pT3, pN1a(sn), cM0, G2, ER+, IA+, HER2-, Oncotype DX score: 24) - Signed by Georgia Barrera MD on 4/28/2020      ECOG Performance   ECOG Performance Status: 0    Distress Assessment  Distress Assessment Score: 2:     Pain   : Please see the discussion below about managing the pain.    Ms. Janeth Mathias is a 55 y.o. woman who noticed an inverted nipple on the right side in July 2019 and a lump in the right breast by November 2019.  The mammogram showed a mass within the right breast at the 12 o'clock position which on imaging was about 5.4 cm in size.  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  She had a bilateral mastectomy and right-sided sentinel lymph node dissection with tissue expander placement done on 3/18/2020.  The posterior margin of the sample on the right side came back positive and so she had to have a reresection done on 3/30/2020.     Final pathology showed a 7.5 cm, grade 2, invasive ductal carcinoma, which also had some lymphovascular invasion.  1 of the 3 lymph nodes was positive for a 1.1 cm metastatic focus.  20% of the tumor was a solid type DCIS.  Final stage was pT3, pN1a, cM0.  Estrogen and progesterone receptor strongly positive and HER-2 negative.    The Oncotype DX score came back at 24.  Even though by the score alone this comes in the intermediate range, when we add in the clinical features, especially the size of the cancer and the lymph node positivity the recurrence risk is  significantly high.  Thus we decided to go for adjuvant chemotherapy followed by a radiation oncology consultation and then adjuvant estrogen blockade therapy.    She received chemotherapy with dose dense AC for 4 cycles followed by Taxol given once weekly for 12 weeks.  Chemotherapy was started on 5/8/2020.  She completed the whole course of chemotherapy on 9/18/2020 with the 12th dose of Taxol.    She completed adjuvant radiation on 12/3/2020.  She started Arimidex treatment on 12/4/2020.    1.  She appears to be doing well overall.  In fact she looks quite good on physical examination.  She looks tanned and fit.  No concern for breast cancer recurrence based on physical exam or her complaints.  She has some hot flashes etc. from the Arimidex but overall I think she is tolerating it okay.  She also complains of some joint aches in her legs but I think that is more likely due to something like plantar fasciitis rather than directly due to Arimidex.  She is willing to continue with Arimidex daily.    2.  Active CBC differential and platelets today because she had a mild leukopenia even in the last visit in December leftover from the chemotherapy.  That has now normalized.  White blood cell count was 4.6 with a normal differential.  Hemoglobin is normal at 13.3.  Platelet count is normal at 221,000.  She was happy to see that.    3.  She complains of some episodic dizzy spells.  This appears to correlate with her getting up from a laying down position or from a sitting position.  Suspect benign positional vertigo.  I am referring her to [facility] according to her convenience.  Hopefully they will be able to help her with an Epley maneuver etc. if this is truly benign positional vertigo.    4.  She will call when she needs a new prescription for the Arimidex.    5.  Encouraged her to continue with physical activity and exercise to prevent osteoporosis.  Encouraged her to continue with calcium and vitamin D  tablets.    6.  He still has a little bit of numbness left over in her fingertips and toes from the chemotherapy causing peripheral neuropathy.  I think this is going to continue to improve some more before it stabilizes.  It is not causing much trouble for her at this time.  I do not think any more intervention is needed.    7.  I suspect plantar fasciitis is part of the reason why she is having pain in her feet, ankles and knees.  I advised her to start using an over-the-counter insert inside her shoes.  That may help more than anticipated.    8.  She does not need a mammogram because she had a bilateral mastectomy.    9.  I discussed with her that we are happy to continue to follow her here.  I would recommend a follow-up approximately every 3 months or so for the first 2 years and if she does well after that we can reduce the frequency of follow-up to every 6 months.  The 3 monthly follow-up does not hurt to be strictly to the day and she cannot make the appointments according to her convenience depending on when she comes to [place].  I discussed with her that we will be a good idea for her to find at least a primary care physician in [place] since she is going to be living there long-term.  She will have her other health problems also coming up which will need to be taken care of.  In case something suddenly happens, we will have the help.  She voiced understanding.    10.  Follow-up: Return to clinic with MD or NP in approximately 3 months.    Time spend >30 minutes total time for the patient.       Problem List    1. Malignant neoplasm of upper-inner quadrant of right breast in female, estrogen receptor positive (H)     2. Benign paroxysmal positional vertigo, unspecified laterality  Ambulatory referral to ENT   3. Chemotherapy-induced peripheral neuropathy (H)          CC: Ella Moreno MD           ______________________________________________________________________________    History of Present  Illness    Ms. Janeth Mathias is here for follow-up alone.    Overall she feels that she is doing well.  Her  has retired.  They decided to move to [place].  She is working mostly remotely.  She will be coming approximately once a month to [place] as part of her work.  She is hoping that she can continue follow-up with us in [place] even while she lives in [place].    The only complaint that she has is that occasionally she has some dizziness.  She has had vertigo in the past.  She says that she suddenly feels a bit dizzy when she gets up from the sitting down position or lying down position.  These dizzy spells appear to correlate with change in her head posture.  She does not have any hearing issues.    Energy has improved but she does not think that she is quite back to what she was before she was treated for the cancer.    Peripheral neuropathy has improved.  She has only L bit of numbness left in the toes.  This does not cause much trouble for her.  She has a bit of numbness left in her fingertips also.  She is mostly able to do all her work but sometimes buttons her heart.    She complains of some pain in her feet standing for some time.    She is taking the Arimidex tablets daily and tolerating them okay.  She has some hot flashes now and then.  That does not trouble her much.  She also has some joint aches.  Again the pain that she feels is mostly in her knees and ankles.    She has not noticed any lumps or bumps anywhere.  No other aches or pains.  Weight has been stable.  No unusual headaches.  No eyesight problems.  No mouth sores.  No swallowing difficulty.  No nausea or vomiting.  No abdominal pain.  No constipation or diarrhea.  No blood in stool or black stools.  No skin rashes.  No numbness or tingling.  No vaginal bleeding.  Breathing is fine.  No chest pain or cough.    Please see below.  A 14 point review of system is otherwise completely negative.        Pain Status  Currently in Pain:  Yes    Review of Systems    Constitutional  Constitutional (WDL): Exceptions to WDL  Fatigue: Fatigue relieved by rest  Fever: None  Chills: None  Weight Gain: None  Weight Loss: None  Neurosensory  Neurosensory (WDL): Exceptions to WDL  Peripheral Motor Neuropathy: Asymptomatic, clinical or diagnostic observations only, intervention not indicated  Ataxia: None  Peripheral Sensory Neuropathy: Asymptomatic, loss of deep tendon reflexes or paresthesia(fingers and toes, improving)  Confusion: None  Syncope: None  Eye   Eye Disorder (WDL): All eye disorder elements are within defined limits  Ear  Ear Disorder (WDL): All ear disorder elements are within defined limits  Cardiovascular  Cardiovascular (WDL): All cardiovascular elements are within defined limits(hx asthma)  Pulmonary  Respiratory (WDL): Exceptions to WDL  Cough: None  Dyspnea: None  Hypoxia: None  Gastrointestinal  Gastrointestinal (WDL): All gastrointestinal elements are within defined limits  Genitourinary  Genitourinary (WDL): All genitourinary elements are within defined limits  Lymphatic  Lymph (WDL): All lymph disorder elements are within defined limits  Musculoskeletal and Connective Tissue  Arthralgia: Mild pain(knees, ankles)  Bone Pain: None  Muscle Weakness : None  Myalgia: None  Integumentary  Integumentary (WDL): All integumentary elements are within defined limits  Patient Coping  Patient Coping: Accepting  Distress Assessment  Distress Assessment Score: 2  Accompanied by  Accompanied by: Alone  Oral Chemo Adherence         Past History  Past Medical History:   Diagnosis Date     Anxiety      Asthma      Breast cancer (H)      Carpal tunnel syndrome      Depression      Exercise induced bronchospasm      Insomnia      Migraine      Osteoarthritis of knees, bilateral      Recurrent cold sores      Shortness of breath      Temporomandibular joint pain 03/1993    Uses a mouthguard.         Past Surgical History:   Procedure Laterality Date      ABDOMINOPLASTY  06/2019     CARPAL TUNNEL RELEASE Right 07/14/2008     COLONOSCOPY  11/01/2017    Repeat in 10 years.     NM SENTINEL NODE INJECTION  3/18/2020     PARTIAL KNEE ARTHROPLASTY Left 07/16/2018     ME INSERT TISSUE EXPANDER(S) Right 3/30/2020    Procedure: REVISION RIGHT BREAST RECONSTRUCTION WITH PLACEMENT SUBMUSCULAR  TISSUE EXPANDER;  Surgeon: Sunni Hunter MD;  Location: [facility];  Service: Plastics     ME MASTECTOMY, SIMPLE, COMPLETE Bilateral 3/18/2020    Procedure: Bilateral mastectomies;  Surgeon: Kayla Beaver MD;  Location: [facility] OR;  Service: General     ME MASTECTOMY, SIMPLE, COMPLETE Right 3/30/2020    Procedure: Revision Right Mastectomy;  Surgeon: Kayla Beaver MD;  Location: [facility];  Service: General     ME RMVL LIZABETH CTR VAD W/SUBQ PORT/ CTR/PRPH INSJ N/A 10/9/2020    Procedure: Port Removal;  Surgeon: Kayla Beaver MD;  Location: [facility];  Service: General     ME TISSUE EXPANDER PLACEMENT BREAST RECONSTRUCTION Bilateral 3/18/2020    Procedure: BILATERAL BREAST RECONSTRUCTION WITH TISSUE EXPANDERS;  Surgeon: Sunni Hunter MD;  Location: [facility] OR;  Service: Plastics     REPLACEMENT UNICONDYLAR JOINT KNEE Right 08/14/2019     TONSILLECTOMY AND ADENOIDECTOMY  05/1994     TUBAL LIGATION  1993     TUNNELED VENOUS CATHETER PLACEMENT Left 3/30/2020    Procedure: Port Placement on the Left;  Surgeon: Kayla Beaver MD;  Location: [facility];  Service: General     US BREAST CORE BIOPSY RIGHT Right 2/10/2020       Physical Exam    Recent Vitals 4/9/2021   Weight -   BSA (m2) -   /83   Pulse 98   Temp 98.7   Temp src 1   SpO2 96   Some recent data might be hidden       GENERAL: Alert and oriented to time place and person. Seated comfortably. In no distress.  She looks well overall.  Normal build.  Appears tanned and well.    HEAD: Atraumatic and normocephalic.    EYES: EMMA, EOMI.  No pallor.  No  icterus.    Oral cavity: no mucosal lesion or tonsillar enlargement.    NECK: supple. JVP normal.  No thyroid enlargement.    LYMPH NODES: No palpable, cervical, axillary or inguinal lymphadenopathy.    CHEST: clear to auscultation bilaterally.  Resonant to percussion throughout bilaterally.  Symmetrical breath movements bilaterally.    Breast areas bilaterally were examined.  Status post reconstruction on either side.  Surgical scars are healed well.  No palpable mass or tenderness.  Bilateral axillae are without mass or nodule.    CVS: S1 and S2 are heard. Regular rate and rhythm.  No murmur or gallop or rub heard.  No peripheral edema.    ABDOMEN: Soft. Not tender. Not distended.  No palpable hepatomegaly or splenomegaly.  No other mass palpable.  Bowel sounds heard.    EXTREMITIES: Warm.    SKIN: no rash, or bruising or purpura.  Has a full head of hair.            Lab Results    Recent Results (from the past 168 hour(s))   HM1 (CBC with Diff)   Result Value Ref Range    WBC 4.6 4.0 - 11.0 thou/uL    RBC 4.37 3.80 - 5.40 mill/uL    Hemoglobin 13.3 12.0 - 16.0 g/dL    Hematocrit 40.0 35.0 - 47.0 %    MCV 92 80 - 100 fL    MCH 30.4 27.0 - 34.0 pg    MCHC 33.3 32.0 - 36.0 g/dL    RDW 12.5 11.0 - 14.5 %    Platelets 221 140 - 440 thou/uL    MPV 8.7 8.5 - 12.5 fL    Neutrophils % 67 50 - 70 %    Lymphocytes % 18 (L) 20 - 40 %    Monocytes % 11 (H) 2 - 10 %    Eosinophils % 3 0 - 6 %    Basophils % 0 0 - 2 %    Immature Granulocyte % 0 <=0 %    Neutrophils Absolute 3.0 2.0 - 7.7 thou/uL    Lymphocytes Absolute 0.8 0.8 - 4.4 thou/uL    Monocytes Absolute 0.5 0.0 - 0.9 thou/uL    Eosinophils Absolute 0.1 0.0 - 0.4 thou/uL    Basophils Absolute 0.0 0.0 - 0.2 thou/uL    Immature Granulocyte Absolute 0.0 <=0.0 thou/uL       Imaging    No results found.      Signed by: Georgia Barrera MD

## 2021-06-17 NOTE — TELEPHONE ENCOUNTER
Called Janeth to check in. She is scheduled for breast surgery tomorrow for placement of permanent implants. She will stay in [place] for part of the next month while she heals from surgery. They purchased a town home in [place] and close on their new property next week. Moving to [place] has been  part of their intermediate plan for the past few years and are anxious to get settled in their new community. 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 She plans to continue her cancer care follow up at [facility] with her current care team.Wished her a smooth surgical recovery and safe travel with her move.She appreciated the follow up. Support and encouragement provided. She has writer's contact information for future reference. Will follow up in the future.

## 2021-06-18 NOTE — PATIENT INSTRUCTIONS - HE
Patient Instructions by Yanely Linares RN at 5/8/2020 10:30 AM     Author: Yanely Linares RN Service: -- Author Type: Registered Nurse    Filed: 5/8/2020  2:17 PM Encounter Date: 5/8/2020 Status: Addendum    : Yanely Linares RN (Registered Nurse)    Related Notes: Original Note by Yanely Linares RN (Registered Nurse) filed at 5/8/2020 12:36 PM       Patient Education     Discharge Instructions for Chemotherapy  Your healthcare provider prescribed a type of medicine therapy for you called chemotherapy. It's also known as chemo. Chemo is used for many different types of illnesses, including cancer. There are many types of chemo. This sheet gives some general information on how you can take care of yourself after your chemo. Talk with your healthcare provider about other details based on your own treatment plan.   Mouth care  You may get mouth sores, even if you follow all of your healthcare providers instructions. Many people get mouth sores as a side effect of chemo. Heres what you can do to help prevent mouth sores:    Keep your mouth clean. Brush your teeth with a soft-bristle toothbrush after every meal.    Ask if you should use a toothpaste with fluoride. Or you may be told to brush your teeth with a mixture of 1 teaspoon of salt in 8 ounces of water.     Use an oral swab or special soft toothbrush if your gums bleed during brushing.    Don't use dental floss if it causes your gums to bleed.    Use any mouthwash given to you as directed.    If you cant tolerate regular methods, use salt and baking soda to clean your mouth. Mix 1 teaspoon of salt and 1 teaspoon of baking soda in 1 quart of warm water. Swish and spit.    If you wear dentures, you may be told to wear them only when you eat. Ask your healthcare provider. Clean your dentures twice a day. Soak them in antimicrobial solution when you aren't wearing them. Rinse your mouth after each meal.     Check your mouth and tongue for white patches.  This may be a sign of a type of yeast infection called thrush. This is a common side effect of chemo. Tell your healthcare provider if you get these patches. He or she can prescribe medicine to treat them.  Other self-care  Here's what else you can do:      Try to exercise. Exercise keeps you strong and keeps your heart and lungs active. Walking and yoga are good types of exercise.    Keep clean. During chemo, your body cant fight infection very well. Take short baths or showers. Wash your hands before you eat and after going to the bathroom.    Avoid people who are sick with an illness you could catch. This includes people with colds, flu, measles, or chicken pox. This also includes people who have recently had a vaccine for any illness.    Let your healthcare provider know if your throat is sore. You may have an infection that needs treatment.    Be gentle to your skin. Use moisturizing soap. Chemo can make your skin dry. Apply lotion several times a day to help relieve dry skin. Dont take very hot or very cold showers or baths.    Dont be surprised if your chemo causes slight burns to your skin. This can happen most often on the hands and feet. Some medicines used in high doses cause this. Ask for a special cream to help relieve the burn and protect your skin.    Many people on chemo feel sick and find it hard to eat during treatment. Try these tips:    Eat small meals several times a day to keep your strength up.    Choose bland foods with little taste or smell if you are reacting strongly to food.    Be sure to cook all food fully. This kills bacteria and helps you prevent illness.    Eat foods that are soft. Soft foods are less likely to cause stomach irritation.    Try to eat a variety of foods for a well-balanced diet. Drink plenty of fluids. Eat foods with fiber unless your healthcare provider says not to. Fiber can help to prevent constipation.  When to call your healthcare provider  Call your healthcare  provider right away if you have any of the following:    Unexplained bleeding    Trouble concentrating    Ongoing fatigue    Shortness of breath, wheezing, trouble breathing, or bad cough    Rapid, irregular heartbeat, or chest pain    Dizziness, lightheadedness    Constant feeling of being cold    Hives or a cut or rash that swells, turns red, feels hot or painful, or begins to ooze    Burning when you urinate    Fever of 100.4 F (38 C) or higher, or as directed by your healthcare provider  Date Last Reviewed: 5/1/2016 2000-2019 [employer]. [address] [place] [id number]. All rights reserved. This information is not intended as a substitute for professional medical care. Always follow your healthcare professional's instructions.           The patient attended chairside chemotherapy class today.  The patient was educated on:  -Chemotherapy: what it is and how it works  -Described a typical day at the treatment center and what the patient can expect  -Discussed port access and functions of the port   -Chemotherapy side effects and appropriate management of these side effects. SE discussed included and not limited to: Fatigue, nausea and vomiting, constipation, diarrhea, mucositis, alopecia, peripheral neuropathy, pain, anemia, thrombocytopenia, and neutropenia.    -Reasons to call the physician, including, fever>100.5, shaking chills, unusual bleeding or bruising, shortness of breath, vomiting>12hours, nausea >24 hours, unable to eat/drink >24 hours, painful urination, blood in urine or stool, soreness at the IV site, and painful mouth sores.  The  triage phone number was given to the patient and the patient was encouraged to call with any questions.  The patient was given a tour of the infusion center.  All questions were addressed to the best of my abilities and the patient was encouraged to call with any questions.

## 2021-06-18 NOTE — PATIENT INSTRUCTIONS - HE
Patient Instructions by Daksha Farrell RN at 7/16/2020  8:15 AM     Author: Daksha Farrell RN Service: -- Author Type: Registered Nurse    Filed: 7/16/2020  8:56 AM Encounter Date: 7/16/2020 Status: Signed    : Daksha Farrell RN (Registered Nurse)       Patient Education     Radiation Therapy Treatment  Radiation therapy uses high-energy X-rays to kill cancer cells. Radiation therapy can help you in your fight against cancer. It begins with a session to discuss treatment with your healthcare provider. If you and your provider decide on radiation, you will return for a treatment planning visit called a simulation. Then you will start treatment.  The simulation is a planning session that helps your healthcare provider target your cancer. He or she will design a radiation plan to protect your healthy tissues from radiation. Your radiation therapy team uses a special machine called a simulator to map out your treatment. The simulator is usually an X-ray machine (fluoroscopy), CT scanner, MRI scanner, or PET-CT scanner machine. Laser lights act as guides to help position your body accurately. During this visit:    The team figures out the best position for your body. They make notes in your chart so youll be placed the same way each time.    They may use special devices to keep your body correctly positioned and still during treatment. These may include molds, masks, rests, and blocks.    The team makes ink marks on your skin. These will help you get in the same position for each treatment. Tiny permanent tattoos may also be used. These tattoos may be removed later with laser treatments.    Markers such as metal balls or wires may be put on or in your body. Sometime these are taped to the skin to help with the imaging process. These work with the X-rays to position your body. The markers are removed when the visit is over.  After the team has the imaging and data, the information is sent into the computer  planning system. Your doctor and the team of physicists and dosimetrists design a treatment field. The field will best target your cancer and how it might spread. It will also help limit radiation to nearby normal tissues.  Your treatments  When the simulation and plan are completed, you will begin your daily treatments. Treatment is usually once daily, Monday through Friday, for 5 to 7 weeks. It takes less than 30 minutes. Sometimes you may need radiation twice a day, with about 6 hours between treatments.  You may need to change into a hospital gown. The radiation therapist puts you in the correct position on the treatment table, then leaves the room. Sometimes you may need more imaging before each treatment. The machine may take digital X-rays or a CT scan to help make sure you are lined up correctly. During treatment, lie as still as you can and breathe normally. You will hear noises coming from the machine. You can talk to the radiation therapist, who watches you from the control room on a TV monitor. After treatment, the therapist will help you off the table. You can then get dressed and go back to your normal activities.  After treatment  After your radiation treatments are done, you will have follow-up appointments. These are to make sure the cancer is under control. Tell your healthcare team about any side effects from the treatment. The team will help you manage them.  Date Last Reviewed: 6/1/2018 2000-2019 [employer]. [address] [place] [address], [place] [address]. All rights reserved. This information is not intended as a substitute for professional medical care. Always follow your healthcare professional's instructions.

## 2021-06-20 ENCOUNTER — HEALTH MAINTENANCE LETTER (OUTPATIENT)
Age: 56
End: 2021-06-20

## 2021-06-20 NOTE — LETTER
Letter by Christina Shirley RN at      Author: Christina Shirley RN Service: -- Author Type: --    Filed:  Encounter Date: 4/6/2020 Status: (Other)       Dear Janeth,    Thank you for choosing [facility] for your care.  We are committed to providing you with the highest quality and compassionate healthcare services.  The following information pertains to your first appointment with our clinic.    Date/Time of appointment:  Tuesday, April 7, 2020, check in at 12:30 p.m.    Name of your Physician:  Georgia Barrera MD    What to bring to your appointment:    Completed Patient History/Initial Nursing Assessment and Medication/Allergy List (these forms were sent to you).    Any paperwork or films from your physician that we have asked you to bring.    Your current insurance card(s).    Parking:    Please refer to the map included to direct you to [facility].    You can park in any visitor/patient parking area you choose. There is no charge for parking at [facility].     Enter the hospital at the front/main entrance.      Please check in with our  representatives who will escort you to the clinic located in [address] of the [facility].    We hope these instructions are helpful to you.  If you have any questions or concerns, please call us at [phone].  It is our pleasure to assist you.    Warm Regards,  Christina Shirley RN, OCN  Nurse Navigator   [phone]

## 2021-06-25 NOTE — TELEPHONE ENCOUNTER
SURV care plan sent to [facility] for patient to review.   Will follow up with any questions and provide any resources needed.    Katharina ACOSTA RN 6/2/2021 3:53 PM

## 2021-06-25 NOTE — TELEPHONE ENCOUNTER
Called Janeth to see if she had any questions or in need of any resources for [employer].  Janeth said she did not have any questions.   Stated she is tolerating Arimidex fine, no issues.   Writer mentioned to Janeth list of resources listed and to check out [employer] class series.   Janeth stated appreciated f/u.   No other questions.  Katharina ACOSTA RN Survivorship Coordinator 6/9/2021 2:28 PM

## 2021-07-03 NOTE — ADDENDUM NOTE
Addendum Note by Lombardi, Susan L, RN at 2/20/2020  9:20 AM     Author: Lombardi, Susan L, RN Service: -- Author Type: RN, Care Manager    Filed: 2/20/2020  1:33 PM Date of Service: 2/20/2020  9:20 AM Status: Signed    : Lombardi, Susan L, RN (RN, Care Manager)    Encounter addended by: Lombardi, Susan L, RN on: 2/20/2020  1:33 PM      Actions taken: Clinical Note Signed, Charge Capture section accepted

## 2021-07-03 NOTE — ADDENDUM NOTE
Addendum Note by Saida Kolb MD at 9/25/2020 11:28 AM     Author: Saida Kolb MD Service: -- Author Type: Physician    Filed: 9/25/2020 11:28 AM Encounter Date: 9/25/2020 Status: Signed    : Saida Kolb MD (Physician)    Addended by: SAIDA KOLB on: 9/25/2020 11:28 AM        Modules accepted: Orders

## 2021-07-03 NOTE — ADDENDUM NOTE
Addendum Note by Italia Alvarez MD at 9/15/2020  1:15 PM     Author: Italia Alvarez MD Service: -- Author Type: Physician    Filed: 9/16/2020  7:59 AM Encounter Date: 9/15/2020 Status: Signed    : Italia Alvarez MD (Physician)    Addended by: ITALIA ALVAREZ on: 9/16/2020 07:59 AM        Modules accepted: Orders

## 2021-07-04 NOTE — TELEPHONE ENCOUNTER
Telephone Encounter by Shannen Haynes RN at 6/2/2021  9:45 AM     Author: Shannen Haynes RN Service: -- Author Type: Registered Nurse    Filed: 6/2/2021 10:14 AM Encounter Date: 6/2/2021 Status: Signed    : Shannen Haynes RN (Registered Nurse)       Cancer Care Refill Protocol Passed    Rerun Protocol (6/2/2021 12:12 AM)     Cancer Care visit in the last 12 months     Last office visit: 4/9/2021 Georgia Barrera MD Next office visit within 3 mo: Visit date not found  Last MTM visit: Visit date not found    Prescriber or current provider: Georgia Barrera MD  Last diagnosis associated with med order:   1. Chemotherapy-induced peripheral neuropathy (H)  - DULoxetine (CYMBALTA) 30 MG capsule [Pharmacy Med Name: DULOXETINE HCL DR 30 MG CAP]; TAKE 1 CAPSULE BY MOUTH EVERY DAY  Dispense: 90 capsule; Refill: 1        Called patient, she is no longer taking medication. Did not order. Med not approved.   Shannen Haynes RN

## 2021-07-21 ENCOUNTER — RECORDS - HEALTHEAST (OUTPATIENT)
Dept: ADMINISTRATIVE | Facility: CLINIC | Age: 56
End: 2021-07-21

## 2021-08-03 PROBLEM — C50.911 MALIGNANT NEOPLASM OF RIGHT BREAST (H): Status: RESOLVED | Noted: 2020-03-25 | Resolved: 2020-04-06

## 2021-08-03 PROBLEM — C50.211 MALIGNANT NEOPLASM OF UPPER-INNER QUADRANT OF RIGHT FEMALE BREAST (H): Status: RESOLVED | Noted: 2020-09-28 | Resolved: 2020-11-06

## 2021-10-11 ENCOUNTER — HEALTH MAINTENANCE LETTER (OUTPATIENT)
Age: 56
End: 2021-10-11

## 2021-10-19 ENCOUNTER — ONCOLOGY VISIT (OUTPATIENT)
Dept: ONCOLOGY | Facility: CLINIC | Age: 56
End: 2021-10-19
Attending: INTERNAL MEDICINE
Payer: COMMERCIAL

## 2021-10-19 VITALS
BODY MASS INDEX: 33.73 KG/M2 | DIASTOLIC BLOOD PRESSURE: 76 MMHG | SYSTOLIC BLOOD PRESSURE: 128 MMHG | TEMPERATURE: 98.8 F | WEIGHT: 184.4 LBS | RESPIRATION RATE: 16 BRPM | OXYGEN SATURATION: 98 % | HEART RATE: 72 BPM

## 2021-10-19 DIAGNOSIS — Z17.0 MALIGNANT NEOPLASM OF UPPER-INNER QUADRANT OF RIGHT BREAST IN FEMALE, ESTROGEN RECEPTOR POSITIVE (H): Primary | ICD-10-CM

## 2021-10-19 DIAGNOSIS — G62.0 CHEMOTHERAPY-INDUCED PERIPHERAL NEUROPATHY (H): ICD-10-CM

## 2021-10-19 DIAGNOSIS — C50.211 MALIGNANT NEOPLASM OF UPPER-INNER QUADRANT OF RIGHT BREAST IN FEMALE, ESTROGEN RECEPTOR POSITIVE (H): Primary | ICD-10-CM

## 2021-10-19 DIAGNOSIS — T45.1X5A CHEMOTHERAPY-INDUCED PERIPHERAL NEUROPATHY (H): ICD-10-CM

## 2021-10-19 PROCEDURE — G0463 HOSPITAL OUTPT CLINIC VISIT: HCPCS

## 2021-10-19 PROCEDURE — 99214 OFFICE O/P EST MOD 30 MIN: CPT | Performed by: INTERNAL MEDICINE

## 2021-10-19 RX ORDER — ANASTROZOLE 1 MG/1
1 TABLET ORAL DAILY
Qty: 90 TABLET | Refills: 3 | Status: SHIPPED | OUTPATIENT
Start: 2021-10-19 | End: 2022-06-28

## 2021-10-19 ASSESSMENT — PAIN SCALES - GENERAL: PAINLEVEL: NO PAIN (0)

## 2021-10-19 NOTE — PROGRESS NOTES
[facility] Hematology and Oncology Progress Note    Patient: Janeth Mathias  MRN: [id number]  Date of Service: Oct 19, 2021         Reason for Visit    Chief Complaint   Patient presents with     Breast Cancer       Assessment and Plan    Cancer Staging  Malignant neoplasm of upper-inner quadrant of right breast in female, estrogen receptor positive (H)  Staging form: Breast, AJCC 8th Edition  - Pathologic stage from 4/28/2020: Stage IB (pT3, pN1a(sn), cM0, G2, ER+, NC+, HER2-, Oncotype DX score: 24) - Signed by Georgia Barrera MD on 4/28/2020    ECOG Performance    0 - Independent     Pain  Pain Score: No Pain (0)      Ms.Randee KATHRYN Mathias is a 56 year old woman who noticed an inverted nipple on the right side in July 2019 and a lump in the right breast by November 2019.  The mammogram showed a mass within the right breast at the 12 o'clock position which on imaging was about 5.4 cm in size.  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  She had a bilateral mastectomy and right-sided sentinel lymph node dissection with tissue expander placement done on 3/18/2020.  The posterior margin of the sample on the right side came back positive and so she had to have a reresection done on 3/30/2020.     Final pathology showed a 7.5 cm, grade 2, invasive ductal carcinoma, which also had some lymphovascular invasion.  1 of the 3 lymph nodes was positive for a 1.1 cm metastatic focus.  20% of the tumor was a solid type DCIS.  Final stage was pT3, pN1a, cM0.  Estrogen and progesterone receptor strongly positive and HER-2 negative.     The Oncotype DX score came back at 24.  Even though by the score alone this comes in the intermediate range, when we add in the clinical features, especially the size of the cancer and the lymph node positivity the recurrence risk is significantly high.  Thus we decided to go for adjuvant chemotherapy  followed by a radiation oncology consultation and then adjuvant estrogen blockade therapy.     She received chemotherapy with dose dense AC for 4 cycles followed by Taxol given once weekly for 12 weeks.  Chemotherapy was started on 5/8/2020.  She completed the whole course of chemotherapy on 9/18/2020 with the 12th dose of Taxol.     She completed adjuvant radiation on 12/3/2020.  She started Arimidex treatment on 12/4/2020.    1.  I discussed with her about the unfortunate event of having implant failure and infections at the reconstruction sites bilaterally where she had to have both implants removed.  I discussed with her that I agree that the ramey thing is to allow healing to happen over a few months and then think about reconstruction.  I discussed with her that she should follow the instructions and advice of the plastic surgeon.  They have the best idea about what to do going forward.  I advised her to try to keep the folds of the skin dry.  That will help with the redness subsiding and prevent further infections.    2.  Advised her to continue taking Arimidex daily.  Recommended completing at least 5 years of Arimidex.  A new prescription for Arimidex 90 tablets with 3 refills were sent to her pharmacy.    3.  Discussed with her about the peripheral neuropathy that is factorial in her fingertips and toes.  She has a small amount of numbness left over.  This started after the chemotherapy.  Now she is a year out from completing chemotherapy.  Most likely the numbness that is left over is going to remain a long-term.  I do not expect much more improvement at this point.  Fortunately the feeling is mild and it does not cause any functional problem for her.  She voiced understanding.    4.  She has already taken the seasonal flu vaccine and 2 doses of the COVID-19 vaccine.  I discussed with her that I am in favor of her taking the booster vaccine for COVID-19 given that she received chemotherapy last year.   However that is not available through the cancer clinic.  She will need to go through her primary care clinic or the local pharmacy for the COVID-19 vaccine.  She voiced understanding.    5.  We discussed about follow-up.  At this time her plan is to talk to her primary physician in [place] to get a referral to an oncologist locally.  I discussed with her that I would recommend follow-up every 3 months at least in the first 3 years.  She should make sure that her records are sent over to the new oncologist will be following her.  For now I am putting in an order for follow-up with me in 6 months in case something happens and she is not able to get things arranged in [place].  She voiced understanding.    Time spend >30 minutes total time for the patient.           Encounter Diagnoses:    Problem List Items Addressed This Visit        Nervous and Auditory    Chemotherapy-induced peripheral neuropathy (H)       Other    Malignant neoplasm of upper-inner quadrant of right breast in female, estrogen receptor positive (H) - Primary    Relevant Medications    anastrozole (ARIMIDEX) 1 MG tablet             CC: Ella Moreno MD   ______________________________________________________________________________    History of Present Illness    Ms. Janeth Mathias is here for follow-up alone.    Nowadays she is living in [place].  They moved to [place] after her  retired.  She is working remotely from there.  She is here visiting [place] and decided to keep her follow-up appointment with me after 6 months.    She says that she had an infection of the implant of the breast reconstruction.  She was in the ER in [place] on May 25.  After that she had a flareup of infection when she went back to [place] and had to have the right side implant removed on July 6.  Later on she had infection on the left side also and had to have a left-sided implant removed on September 2.  She has seen a plastic surgeon in [place].  The  plan is for things to heal over the next 6 months and then consider whether to go for new reconstruction surgeries.  She feels that she is done with surgeries for some time.    In the last 3 4 weeks she has been feeling back to baseline.  Energy is good.  She is walking.  She is walking for exercise.  She has not noticed any lumps or bumps anywhere.  No other aches or pains.    She is taking Arimidex daily.  She would like to have a new prescription sent to her pharmacy in [place].    She has taken the flu shot.  She wonders about taking the booster vaccine for COVID-19.    She still has some numbness In her fingertips from the chemotherapy induced peripheral neuropathy.  Also some numbness left over in some of her toes.  These do not cause much trouble for her functionally.  Typing etc. is done quite well.    Review of systems.  No fever or night sweats.  No loss of weight.  No lumps or bumps anywhere.  No unusual headaches or eyesight issues.  No dizziness.  No bleeding from the nose.  No sores in the mouth. No problems with swallowing.  No chest pain. No shortness of breath. No cough.  No abdominal pain. No nausea or vomiting.  No diarrhea or constipation.  No blood in stool or black colored stools.  No problems passing urine.  No skin rashes.  A 14 point review of systems is otherwise negative.        Past History    Past Medical History:   Diagnosis Date     Anxiety      Asthma      Breast cancer (H)      Carpal tunnel syndrome      Depression      Exercise induced bronchospasm      Insomnia      Migraine      Osteoarthritis of knees, bilateral      Recurrent cold sores      Temporomandibular joint pain 03/01/1993    Uses a mouthguard.       Past Surgical History:   Procedure Laterality Date     ABDOMINOPLASTY  06/01/2019     ARTHROPLASTY KNEE UNICOMPARTMENT Right 08/14/2019     AS REMOVAL OF BREAST IMPLANT Right 07/06/2021     AS REMOVAL OF BREAST IMPLANT Left 09/02/2021     COLONOSCOPY  11/01/2017    Repeat  in 10 years.     HC BREAST RECONSTRUC W TISS EXPANDR Bilateral 03/18/2020    Procedure: BILATERAL BREAST RECONSTRUCTION WITH TISSUE EXPANDERS;  Surgeon: Sunni Hunter MD;  Location: [facility];  Service: Plastics     NM SENTINEL NODE INJECTION  03/18/2020     PARTIAL KNEE ARTHROPLASTY Left 07/16/2018     CO INSERT TISSUE EXPANDER(S) Right 03/30/2020    Procedure: REVISION RIGHT BREAST RECONSTRUCTION WITH PLACEMENT SUBMUSCULAR  TISSUE EXPANDER;  Surgeon: Sunni Hunter MD;  Location: [facility] OR;  Service: Plastics     CO MASTECTOMY, SIMPLE, COMPLETE Bilateral 03/18/2020    Procedure: Bilateral mastectomies;  Surgeon: Kayla Beaver MD;  Location: [facility] OR;  Service: General     CO MASTECTOMY, SIMPLE, COMPLETE Right 03/30/2020    Procedure: Revision Right Mastectomy;  Surgeon: Kayla Beaver MD;  Location: [facility] OR;  Service: General     CO RMVL LIZABETH CTR VAD W/SUBQ PORT/ CTR/PRPH INSJ N/A 10/09/2020    Procedure: Port Removal;  Surgeon: Kayla Beaver MD;  Location: [facility] OR;  Service: General     RELEASE CARPAL TUNNEL Right 07/14/2008     TONSILLECTOMY & ADENOIDECTOMY  05/01/1994     TUBAL LIGATION  01/01/1993     TUNNELED VENOUS CATHETER PLACEMENT Left 03/30/2020    Procedure: Port Placement on the Left;  Surgeon: Kayla Beaver MD;  Location: [facility];  Service: General     US BREAST CORE BIOPSY RIGHT Right 02/10/2020         Physical Exam    /76 (Patient Position: Sitting)   Pulse 72   Temp 98.8  F (37.1  C) (Oral)   Resp 16   Wt 83.6 kg (184 lb 6.4 oz)   SpO2 98%   BMI 33.73 kg/m        GENERAL: Alert and oriented to time place and person. Seated comfortably. In no distress.  Normal build.  Very pleasant.  She looks well overall.  Tanned.    HEAD: Atraumatic and normocephalic.    EYES: EMMA, EOMI.  No pallor.  No icterus.    Oral cavity: no mucosal lesion or tonsillar enlargement.    NECK: supple. JVP normal.  No thyroid  enlargement.    LYMPH NODES: No palpable, cervical, axillary or inguinal lymphadenopathy.    CHEST: clear to auscultation bilaterally.  Resonant to percussion throughout bilaterally.  Symmetrical breath movements bilaterally.    Bilateral breast areas were examined.  The implants have been removed on both sides.  In both chest areas, under the folds on the most inferior part, there is a bit of skin redness of the left lower with moistness.  Otherwise the skin folds appear normal without any tenderness or swelling.  Bilateral axillae were examined and there was no lymphadenopathy.    CVS: S1 and S2 are heard. Regular rate and rhythm.  No murmur or gallop or rub heard.  No peripheral edema.    ABDOMEN: Soft. Not tender. Not distended.  No palpable hepatomegaly or splenomegaly.  No other mass palpable.  Bowel sounds heard.    EXTREMITIES: Warm.    SKIN: no rash, or bruising or purpura.  Has a full head of hair.      Lab Results    No results found for this or any previous visit (from the past 168 hour(s)).    Imaging    No results found.      Signed by: Georgia Barrera MD

## 2021-10-19 NOTE — LETTER
10/19/2021         RE: Janeth Mathias  [address] [place] [id number]        Dear Colleague,    Thank you for referring your patient, Janeth Mathias, to the [facility] CANCER [facility]. Please see a copy of my visit note below.    [facility] Hematology and Oncology Progress Note    Patient: Janeth Mathias  MRN: [id number]  Date of Service: Oct 19, 2021         Reason for Visit    Chief Complaint   Patient presents with     Breast Cancer       Assessment and Plan    Cancer Staging  Malignant neoplasm of upper-inner quadrant of right breast in female, estrogen receptor positive (H)  Staging form: Breast, AJCC 8th Edition  - Pathologic stage from 4/28/2020: Stage IB (pT3, pN1a(sn), cM0, G2, ER+, NC+, HER2-, Oncotype DX score: 24) - Signed by Georgia Barrera MD on 4/28/2020    ECOG Performance    0 - Independent     Pain  Pain Score: No Pain (0)      Ms.Randee KATHRYN Mathias is a 56 year old woman who noticed an inverted nipple on the right side in July 2019 and a lump in the right breast by November 2019.  The mammogram showed a mass within the right breast at the 12 o'clock position which on imaging was about 5.4 cm in size.  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  She had a bilateral mastectomy and right-sided sentinel lymph node dissection with tissue expander placement done on 3/18/2020.  The posterior margin of the sample on the right side came back positive and so she had to have a reresection done on 3/30/2020.     Final pathology showed a 7.5 cm, grade 2, invasive ductal carcinoma, which also had some lymphovascular invasion.  1 of the 3 lymph nodes was positive for a 1.1 cm metastatic focus.  20% of the tumor was a solid type DCIS.  Final stage was pT3, pN1a, cM0.  Estrogen and progesterone receptor strongly positive and HER-2 negative.     The Oncotype DX score came back at 24.  Even  though by the score alone this comes in the intermediate range, when we add in the clinical features, especially the size of the cancer and the lymph node positivity the recurrence risk is significantly high.  Thus we decided to go for adjuvant chemotherapy followed by a radiation oncology consultation and then adjuvant estrogen blockade therapy.     She received chemotherapy with dose dense AC for 4 cycles followed by Taxol given once weekly for 12 weeks.  Chemotherapy was started on 5/8/2020.  She completed the whole course of chemotherapy on 9/18/2020 with the 12th dose of Taxol.     She completed adjuvant radiation on 12/3/2020.  She started Arimidex treatment on 12/4/2020.    1.  I discussed with her about the unfortunate event of having implant failure and infections at the reconstruction sites bilaterally where she had to have both implants removed.  I discussed with her that I agree that the ramey thing is to allow healing to happen over a few months and then think about reconstruction.  I discussed with her that she should follow the instructions and advice of the plastic surgeon.  They have the best idea about what to do going forward.  I advised her to try to keep the folds of the skin dry.  That will help with the redness subsiding and prevent further infections.    2.  Advised her to continue taking Arimidex daily.  Recommended completing at least 5 years of Arimidex.  A new prescription for Arimidex 90 tablets with 3 refills were sent to her pharmacy.    3.  Discussed with her about the peripheral neuropathy that is factorial in her fingertips and toes.  She has a small amount of numbness left over.  This started after the chemotherapy.  Now she is a year out from completing chemotherapy.  Most likely the numbness that is left over is going to remain a long-term.  I do not expect much more improvement at this point.  Fortunately the feeling is mild and it does not cause any functional problem for her.   She voiced understanding.    4.  She has already taken the seasonal flu vaccine and 2 doses of the COVID-19 vaccine.  I discussed with her that I am in favor of her taking the booster vaccine for COVID-19 given that she received chemotherapy last year.  However that is not available through the cancer clinic.  She will need to go through her primary care clinic or the local pharmacy for the COVID-19 vaccine.  She voiced understanding.    5.  We discussed about follow-up.  At this time her plan is to talk to her primary physician in [place] to get a referral to an oncologist locally.  I discussed with her that I would recommend follow-up every 3 months at least in the first 3 years.  She should make sure that her records are sent over to the new oncologist will be following her.  For now I am putting in an order for follow-up with me in 6 months in case something happens and she is not able to get things arranged in [place].  She voiced understanding.    Time spend >30 minutes total time for the patient.           Encounter Diagnoses:    Problem List Items Addressed This Visit        Nervous and Auditory    Chemotherapy-induced peripheral neuropathy (H)       Other    Malignant neoplasm of upper-inner quadrant of right breast in female, estrogen receptor positive (H) - Primary    Relevant Medications    anastrozole (ARIMIDEX) 1 MG tablet             CC: Ella Moreno MD   ______________________________________________________________________________    History of Present Illness    Ms. Janeth Mathias is here for follow-up alone.    Nowadays she is living in [place].  They moved to [place] after her  retired.  She is working remotely from there.  She is here visiting [place] and decided to keep her follow-up appointment with me after 6 months.    She says that she had an infection of the implant of the breast reconstruction.  She was in the ER in [place] on May 25.  After that she had a flareup of  infection when she went back to [place] and had to have the right side implant removed on July 6.  Later on she had infection on the left side also and had to have a left-sided implant removed on September 2.  She has seen a plastic surgeon in [place].  The plan is for things to heal over the next 6 months and then consider whether to go for new reconstruction surgeries.  She feels that she is done with surgeries for some time.    In the last 3 4 weeks she has been feeling back to baseline.  Energy is good.  She is walking.  She is walking for exercise.  She has not noticed any lumps or bumps anywhere.  No other aches or pains.    She is taking Arimidex daily.  She would like to have a new prescription sent to her pharmacy in [place].    She has taken the flu shot.  She wonders about taking the booster vaccine for COVID-19.    She still has some numbness In her fingertips from the chemotherapy induced peripheral neuropathy.  Also some numbness left over in some of her toes.  These do not cause much trouble for her functionally.  Typing etc. is done quite well.    Review of systems.  No fever or night sweats.  No loss of weight.  No lumps or bumps anywhere.  No unusual headaches or eyesight issues.  No dizziness.  No bleeding from the nose.  No sores in the mouth. No problems with swallowing.  No chest pain. No shortness of breath. No cough.  No abdominal pain. No nausea or vomiting.  No diarrhea or constipation.  No blood in stool or black colored stools.  No problems passing urine.  No skin rashes.  A 14 point review of systems is otherwise negative.        Past History    Past Medical History:   Diagnosis Date     Anxiety      Asthma      Breast cancer (H)      Carpal tunnel syndrome      Depression      Exercise induced bronchospasm      Insomnia      Migraine      Osteoarthritis of knees, bilateral      Recurrent cold sores      Temporomandibular joint pain 03/01/1993    Uses a mouthguard.       Past Surgical  History:   Procedure Laterality Date     ABDOMINOPLASTY  06/01/2019     ARTHROPLASTY KNEE UNICOMPARTMENT Right 08/14/2019     AS REMOVAL OF BREAST IMPLANT Right 07/06/2021     AS REMOVAL OF BREAST IMPLANT Left 09/02/2021     COLONOSCOPY  11/01/2017    Repeat in 10 years.     HC BREAST RECONSTRUC W TISS EXPANDR Bilateral 03/18/2020    Procedure: BILATERAL BREAST RECONSTRUCTION WITH TISSUE EXPANDERS;  Surgeon: Sunni Hunter MD;  Location: [facility] Main OR;  Service: Plastics     NM SENTINEL NODE INJECTION  03/18/2020     PARTIAL KNEE ARTHROPLASTY Left 07/16/2018     AL INSERT TISSUE EXPANDER(S) Right 03/30/2020    Procedure: REVISION RIGHT BREAST RECONSTRUCTION WITH PLACEMENT SUBMUSCULAR  TISSUE EXPANDER;  Surgeon: Sunni Hunter MD;  Location: [facility] Main OR;  Service: Plastics     AL MASTECTOMY, SIMPLE, COMPLETE Bilateral 03/18/2020    Procedure: Bilateral mastectomies;  Surgeon: Kayla Beaver MD;  Location: [facility] OR;  Service: General     AL MASTECTOMY, SIMPLE, COMPLETE Right 03/30/2020    Procedure: Revision Right Mastectomy;  Surgeon: Kayla Beaver MD;  Location: [facility] OR;  Service: General     AL RMVL LIZABETH CTR VAD W/SUBQ PORT/ CTR/PRPH INSJ N/A 10/09/2020    Procedure: Port Removal;  Surgeon: Kayla Beaver MD;  Location: [facility] OR;  Service: General     RELEASE CARPAL TUNNEL Right 07/14/2008     TONSILLECTOMY & ADENOIDECTOMY  05/01/1994     TUBAL LIGATION  01/01/1993     TUNNELED VENOUS CATHETER PLACEMENT Left 03/30/2020    Procedure: Port Placement on the Left;  Surgeon: Kayla Beaver MD;  Location: [facility] OR;  Service: General     US BREAST CORE BIOPSY RIGHT Right 02/10/2020         Physical Exam    /76 (Patient Position: Sitting)   Pulse 72   Temp 98.8  F (37.1  C) (Oral)   Resp 16   Wt 83.6 kg (184 lb 6.4 oz)   SpO2 98%   BMI 33.73 kg/m        GENERAL: Alert and oriented to time place and person. Seated comfortably. In no  distress.  Normal build.  Very pleasant.  She looks well overall.  Tanned.    HEAD: Atraumatic and normocephalic.    EYES: EMMA, EOMI.  No pallor.  No icterus.    Oral cavity: no mucosal lesion or tonsillar enlargement.    NECK: supple. JVP normal.  No thyroid enlargement.    LYMPH NODES: No palpable, cervical, axillary or inguinal lymphadenopathy.    CHEST: clear to auscultation bilaterally.  Resonant to percussion throughout bilaterally.  Symmetrical breath movements bilaterally.    Bilateral breast areas were examined.  The implants have been removed on both sides.  In both chest areas, under the folds on the most inferior part, there is a bit of skin redness of the left lower with moistness.  Otherwise the skin folds appear normal without any tenderness or swelling.  Bilateral axillae were examined and there was no lymphadenopathy.    CVS: S1 and S2 are heard. Regular rate and rhythm.  No murmur or gallop or rub heard.  No peripheral edema.    ABDOMEN: Soft. Not tender. Not distended.  No palpable hepatomegaly or splenomegaly.  No other mass palpable.  Bowel sounds heard.    EXTREMITIES: Warm.    SKIN: no rash, or bruising or purpura.  Has a full head of hair.      Lab Results    No results found for this or any previous visit (from the past 168 hour(s)).    Imaging    No results found.      Signed by: Georgia Barrera MD        Again, thank you for allowing me to participate in the care of your patient.        Sincerely,        Georgia Barrera MD

## 2021-10-26 NOTE — PROGRESS NOTES
Pt arrived to infusion clinic. Port accessed with great blood return. Labs reviewed. Pt tolerated Taxol infusion well. Port flushed with Heparin, de-accessed, band-aid applied. Pt ambulated out of infusion clinic independently.      JENNY BULL  [id number]    INTERVAL HPI/OVERNIGHT EVENTS:    MEDICATIONS  (STANDING):  apixaban 10 milliGRAM(s) Oral every 12 hours  aspirin  chewable 81 milliGRAM(s) Oral daily  atorvastatin 40 milliGRAM(s) Oral at bedtime  carvedilol 12.5 milliGRAM(s) Oral every 12 hours  dextrose 40% Gel 15 Gram(s) Oral once  dextrose 5%. 1000 milliLiter(s) (50 mL/Hr) IV Continuous <Continuous>  dextrose 5%. 1000 milliLiter(s) (100 mL/Hr) IV Continuous <Continuous>  dextrose 50% Injectable 25 Gram(s) IV Push once  dextrose 50% Injectable 12.5 Gram(s) IV Push once  dextrose 50% Injectable 25 Gram(s) IV Push once  furosemide    Tablet 40 milliGRAM(s) Oral daily  glucagon  Injectable 1 milliGRAM(s) IntraMuscular once  insulin lispro (ADMELOG) corrective regimen sliding scale   SubCutaneous three times a day before meals  multivitamin 1 Tablet(s) Oral daily  OLANZapine 2.5 milliGRAM(s) Oral at bedtime  pantoprazole    Tablet 40 milliGRAM(s) Oral before breakfast  predniSONE   Tablet 20 milliGRAM(s) Oral daily  sacubitril 24 mG/valsartan 26 mG 1 Tablet(s) Oral two times a day  sertraline 50 milliGRAM(s) Oral daily  spironolactone 25 milliGRAM(s) Oral daily    MEDICATIONS  (PRN):  acetaminophen   Tablet .. 650 milliGRAM(s) Oral every 6 hours PRN Temp greater or equal to 38C (100.4F), Mild Pain (1 - 3)      Allergies    No Known Allergies    Intolerances        Review of Systems:   General: No fevers/chills, no fatigue  HEENT: No blurry vision, dysphagia, or odynophagia  CVS: No CP/palpitations  Resp: No SOB/wheezing  GI: No N/V/C/D/abdominal pain  MSK:   Skin: No new rashes  Neuro: No headaches      Vital Signs Last 24 Hrs  T(C): 36.4 (26 Oct 2021 05:01), Max: 36.7 (25 Oct 2021 17:16)  T(F): 97.5 (26 Oct 2021 05:01), Max: 98 (25 Oct 2021 17:16)  HR: 84 (26 Oct 2021 05:01) (79 - 85)  BP: 118/79 (26 Oct 2021 05:01) (118/79 - 141/99)  BP(mean): 113 (25 Oct 2021 17:16) (113 - 113)  RR: 18 (26 Oct 2021 05:01) (18 - 18)  SpO2: 98% (26 Oct 2021 05:01) (96% - 99%)    Physical Exam:  General: NAD  HEENT: EOMI, MMM  Cardio: +S1/S2, RRR  Resp: CTA b/l  GI: +BS, soft, NT/ND  MSK:  Neuro: AAOx3  Psych: wnl    LABS:    10-26    137  |  103  |  44<H>  ----------------------------<  147<H>  4.3   |  18<L>  |  1.20    Ca    9.8      26 Oct 2021 06:45              RADIOLOGY & ADDITIONAL TESTS:   JENNY BULL  [id number]    INTERVAL HPI/OVERNIGHT EVENTS: No acute events, patient denies chest pain, dyspnea, new rash, joint pains, ocular symptoms, etc. Awaiting discharge.     MEDICATIONS  (STANDING):  apixaban 10 milliGRAM(s) Oral every 12 hours  aspirin  chewable 81 milliGRAM(s) Oral daily  atorvastatin 40 milliGRAM(s) Oral at bedtime  carvedilol 12.5 milliGRAM(s) Oral every 12 hours  dextrose 40% Gel 15 Gram(s) Oral once  dextrose 5%. 1000 milliLiter(s) (50 mL/Hr) IV Continuous <Continuous>  dextrose 5%. 1000 milliLiter(s) (100 mL/Hr) IV Continuous <Continuous>  dextrose 50% Injectable 25 Gram(s) IV Push once  dextrose 50% Injectable 12.5 Gram(s) IV Push once  dextrose 50% Injectable 25 Gram(s) IV Push once  furosemide    Tablet 40 milliGRAM(s) Oral daily  glucagon  Injectable 1 milliGRAM(s) IntraMuscular once  insulin lispro (ADMELOG) corrective regimen sliding scale   SubCutaneous three times a day before meals  multivitamin 1 Tablet(s) Oral daily  OLANZapine 2.5 milliGRAM(s) Oral at bedtime  pantoprazole    Tablet 40 milliGRAM(s) Oral before breakfast  predniSONE   Tablet 20 milliGRAM(s) Oral daily  sacubitril 24 mG/valsartan 26 mG 1 Tablet(s) Oral two times a day  sertraline 50 milliGRAM(s) Oral daily  spironolactone 25 milliGRAM(s) Oral daily    MEDICATIONS  (PRN):  acetaminophen   Tablet .. 650 milliGRAM(s) Oral every 6 hours PRN Temp greater or equal to 38C (100.4F), Mild Pain (1 - 3)      Allergies    No Known Allergies    Intolerances            Vital Signs Last 24 Hrs  T(C): 36.4 (26 Oct 2021 05:01), Max: 36.7 (25 Oct 2021 17:16)  T(F): 97.5 (26 Oct 2021 05:01), Max: 98 (25 Oct 2021 17:16)  HR: 84 (26 Oct 2021 05:01) (79 - 85)  BP: 118/79 (26 Oct 2021 05:01) (118/79 - 141/99)  BP(mean): 113 (25 Oct 2021 17:16) (113 - 113)  RR: 18 (26 Oct 2021 05:01) (18 - 18)  SpO2: 98% (26 Oct 2021 05:01) (96% - 99%)    Physical Exam:  General: NAD  HEENT: EOMI, MMM  Cardio: +S1/S2, RRR  Resp: CTA b/l  GI: +BS, soft, NT/ND  MSK: No joint synovitis  Neuro: AAOx3  Psych: wnl    LABS:    10-26    137  |  103  |  44<H>  ----------------------------<  147<H>  4.3   |  18<L>  |  1.20    Ca    9.8      26 Oct 2021 06:45              RADIOLOGY & ADDITIONAL TESTS:

## 2022-06-28 ENCOUNTER — ONCOLOGY VISIT (OUTPATIENT)
Dept: ONCOLOGY | Facility: CLINIC | Age: 57
End: 2022-06-28
Attending: NURSE PRACTITIONER
Payer: COMMERCIAL

## 2022-06-28 VITALS
TEMPERATURE: 99 F | HEART RATE: 94 BPM | WEIGHT: 196.8 LBS | SYSTOLIC BLOOD PRESSURE: 141 MMHG | RESPIRATION RATE: 16 BRPM | DIASTOLIC BLOOD PRESSURE: 92 MMHG | OXYGEN SATURATION: 95 % | BODY MASS INDEX: 36 KG/M2

## 2022-06-28 DIAGNOSIS — C50.211 MALIGNANT NEOPLASM OF UPPER-INNER QUADRANT OF RIGHT BREAST IN FEMALE, ESTROGEN RECEPTOR POSITIVE (H): Primary | ICD-10-CM

## 2022-06-28 DIAGNOSIS — Z17.0 MALIGNANT NEOPLASM OF UPPER-INNER QUADRANT OF RIGHT BREAST IN FEMALE, ESTROGEN RECEPTOR POSITIVE (H): Primary | ICD-10-CM

## 2022-06-28 PROCEDURE — 99214 OFFICE O/P EST MOD 30 MIN: CPT | Performed by: NURSE PRACTITIONER

## 2022-06-28 PROCEDURE — G0463 HOSPITAL OUTPT CLINIC VISIT: HCPCS

## 2022-06-28 RX ORDER — HYDROCODONE BITARTRATE AND ACETAMINOPHEN 10; 325 MG/1; MG/1
TABLET ORAL
COMMUNITY
Start: 2021-09-03 | End: 2022-12-14

## 2022-06-28 RX ORDER — BUDESONIDE AND FORMOTEROL FUMARATE DIHYDRATE 80; 4.5 UG/1; UG/1
AEROSOL RESPIRATORY (INHALATION)
COMMUNITY
Start: 2022-04-29

## 2022-06-28 RX ORDER — ZOLPIDEM TARTRATE 10 MG/1
10 TABLET ORAL AT BEDTIME
COMMUNITY
Start: 2022-06-01

## 2022-06-28 RX ORDER — LEVOTHYROXINE SODIUM 50 UG/1
TABLET ORAL
COMMUNITY
Start: 2022-02-24 | End: 2022-12-14 | Stop reason: DRUGHIGH

## 2022-06-28 RX ORDER — ANASTROZOLE 1 MG/1
1 TABLET ORAL
COMMUNITY
Start: 2020-11-06 | End: 2022-12-14

## 2022-06-28 RX ORDER — TRAZODONE HYDROCHLORIDE 50 MG/1
TABLET, FILM COATED ORAL
COMMUNITY
Start: 2022-04-22

## 2022-06-28 RX ORDER — ESCITALOPRAM OXALATE 20 MG/1
20 TABLET ORAL DAILY
COMMUNITY
Start: 2022-06-01

## 2022-06-28 RX ORDER — CLINDAMYCIN HCL 150 MG
CAPSULE ORAL
COMMUNITY
Start: 2022-03-24

## 2022-06-28 RX ORDER — ACETAMINOPHEN 500 MG
500 TABLET ORAL
COMMUNITY

## 2022-06-28 RX ORDER — DOCUSATE SODIUM 100 MG/1
CAPSULE, LIQUID FILLED ORAL
COMMUNITY

## 2022-06-28 RX ORDER — ANASTROZOLE 1 MG/1
1 TABLET ORAL DAILY
Qty: 90 TABLET | Refills: 3 | Status: SHIPPED | OUTPATIENT
Start: 2022-06-28 | End: 2022-12-14

## 2022-06-28 RX ORDER — VALACYCLOVIR HYDROCHLORIDE 1 G/1
1 TABLET, FILM COATED ORAL
COMMUNITY

## 2022-06-28 RX ORDER — RIZATRIPTAN BENZOATE 10 MG/1
TABLET ORAL
COMMUNITY
Start: 2022-04-08

## 2022-06-28 RX ORDER — ALPRAZOLAM 0.25 MG
TABLET ORAL
COMMUNITY
Start: 2022-04-26

## 2022-06-28 ASSESSMENT — PAIN SCALES - GENERAL: PAINLEVEL: NO PAIN (0)

## 2022-06-28 NOTE — LETTER
6/28/2022         RE: Janeth Mathias  [address] [place] [id number]        Dear Colleague,    Thank you for referring your patient, Janeth Mathias, to the [facility] [place]. Please see a copy of my visit note below.    [facility] Hematology and Oncology Progress Note    Patient: Janeth Mathias  MRN: [id number]  Date of Service: 06/28/2022        Reason for Visit    Chief Complaint   Patient presents with     Oncology Clinic Visit     Malignant neoplasm of upper-inner quadrant of right breast in female, estrogen receptor positive       Assessment and Plan    Cancer Staging  Malignant neoplasm of upper-inner quadrant of right breast in female, estrogen receptor positive (H)  Staging form: Breast, AJCC 8th Edition  - Pathologic stage from 3/18/2020: Stage IB (pT3, pN1a, cM0, G2, ER+, PA+, HER2-, Oncotype DX score: 24) - Signed by Lisa Epps APRN CNP on 6/28/2022    1. Breast cancer: currently on anastrozole, which she started in December 2020.  Patient is overall tolerating this pretty well.  We will continue her current regimen.  She will continue to be seen every 6 months.  Hopefully we will have a new physician at that time and she can see the new doctor.  If not she will see one of our current providers.  She does spend most of her time in [place] so we will work around her schedule when she is back in town.  Patient had some questions today about doing routine imaging and we explained why we do not do that for surveillance at this point.  Encouraged her to call if she has any new worrisome symptoms.    2. Osteopenia: High risk for worsening bone density with the aromatase inhibitor.  She will continue calcium and vitamin D supplement.  She states she had a bone density scan done in [place].  I told her she should do those every 2 years while she is on the anastrozole.  Encouraged her to have them send us a copy when she gets it done.  Encourage also to participate  in weightbearing exercises. Good calcium in diet.     3.  Mild discomfort/muscle spasm in right chest: Patient states this has been going on for a while.  Some days it is worse.  I do not feel anything abnormal except scar tissue which is common after she has had her implants removed.  She did have a chest CT done 1 year ago and it did not show any worrisome symptoms and she was having the symptom at that time.  No need for further imaging.  Continue to monitor and if it continues to get worse if she notices any changes in the chest wall to give us a call.    ECOG Performance    0 - Independent    Distress Screening (within last 30 days)    No data recorded     Pain  Pain Score: No Pain (0)    Problem List    Patient Active Problem List   Diagnosis     Malignant neoplasm of upper-inner quadrant of right breast in female, estrogen receptor positive (H)     Chemotherapy-induced peripheral neuropathy (H)     Benign paroxysmal positional vertigo, unspecified laterality        ______________________________________________________________________________    History of Present Illness    DIAGNOSIS: inverted nipple July 2019. Biopsy  invasive ductal carcinoma, grade 2 that was strongly estrogen and progesterone receptor positive and HER-2 negative  Final pathology showed a 7.5 cm, grade 2, invasive ductal carcinoma, which also had some lymphovascular invasion.  1 of the 3 lymph nodes was positive for a 1.1 cm metastatic focus.  20% of the tumor was a solid type DCIS.  Final stage was pT3, pN1a, cM0.  Estrogen and progesterone receptor strongly positive and HER-2 negative.  The Oncotype DX score came back at 24.     TREATMENT:   -She had a bilateral mastectomy and right-sided sentinel lymph node dissection with tissue expander placement done on 3/18/2020.  The posterior margin of the sample on the right side came back positive and so she had to have a re-resection done on 3/30/2020.  -chemotherapy with dose dense AC for 4  cycles followed by Taxol given once weekly for 12 weeks.  Chemotherapy was started on 5/8/2020. She completed the whole course of chemotherapy on 9/18/2020 with the 12th dose of Taxol.  -She completed adjuvant radiation on 12/3/2020.    -She started Arimidex treatment on 12/4/2020.    INTERIM HISTORY:   Patient is here today for 6-month follow-up visit.  Overall she states she is doing well and really has no major complaints.  She says she does have muscle spasms lateral to where her implants were removed and along the edge of where her breast was.  This has been going on for a year to really since her treatment.  She says some days they are worse and its pretty severe and then other days it does not happen at all.  She has not noticed any changes in the skin or in the tissue.  She still think she is getting have some sort of reconstruction done since her implants had to be removed with infection but she is not ready to have more surgery at this time and is not sure what she wants to do.  She denies any major issues with anastrozole.  She has mild joint stiffness and achiness but had that before and it is only mildly worse.  Does have some vaginal dryness as well.    Review of Systems    Pertinent items are noted in HPI.    Past History    Past Medical History:   Diagnosis Date     Anxiety      Asthma      Breast cancer (H)      Carpal tunnel syndrome      Depression      Exercise induced bronchospasm      Insomnia      Migraine      Osteoarthritis of knees, bilateral      Recurrent cold sores      Temporomandibular joint pain 03/01/1993    Uses a mouthguard.       PHYSICAL EXAM  BP (!) 141/92 (Patient Position: Sitting)   Pulse 94   Temp 99  F (37.2  C) (Oral)   Resp 16   Wt 89.3 kg (196 lb 12.8 oz)   SpO2 95%   BMI 36.00 kg/m      GENERAL: no acute distress. Cooperative in conversation. Here alone. Mask on  RESP: Regular respiratory rate. No expiratory wheezes   MUSCULOSKELETAL: no bilateral leg  "swelling  NEURO: non focal. Alert and oriented x3.   PSYCH: within normal limits. No depression or anxiety.  SKIN: exposed skin is dry intact.   BREAST/CHEST WALL: Patient is status post bilateral mastectomies with implants and now the implants have been removed.  She does have a fair amount of scar tissue.  Right side greater than left side.  There is no sensitivity to palpation.  There is no abnormal rashes or lesions noted.    Lab Results    No results found for this or any previous visit (from the past 168 hour(s)).    Imaging    No results found.  Total time spent with patient in face to face time, chart review and documentation was 30 minutes.        Signed by: RANDEE Diez CNP    Oncology Rooming Note    June 28, 2022 1:07 PM   Janeth Mathias is a 56 year old female who presents for:    Chief Complaint   Patient presents with     Oncology Clinic Visit     Malignant neoplasm of upper-inner quadrant of right breast in female, estrogen receptor positive     Initial Vitals: BP (!) 141/92 (Patient Position: Sitting)   Pulse 94   Temp 99  F (37.2  C) (Oral)   Resp 16   Wt 89.3 kg (196 lb 12.8 oz)   SpO2 95%   BMI 36.00 kg/m   Estimated body mass index is 36 kg/m  as calculated from the following:    Height as of 10/9/20: 1.575 m (5' 2\").    Weight as of this encounter: 89.3 kg (196 lb 12.8 oz). Body surface area is 1.98 meters squared.  No Pain (0) Comment: Data Unavailable   No LMP recorded. Patient is perimenopausal.  Allergies reviewed: Yes  Medications reviewed: Yes    Medications: Medication refills not needed today.  Pharmacy name entered into [employer]:    [id number] - [place] - [address] AT [address]  [id number] - [place] - [address] AT [address]    Clinical concerns: 6 month follow up.        Andrew Perea LPN                  Again, thank you for allowing me to participate in the care of your patient.  "       Sincerely,        RANDEE Diez CNP

## 2022-06-28 NOTE — PROGRESS NOTES
[facility] Hematology and Oncology Progress Note    Patient: Janeth Mathias  MRN: [id number]  Date of Service: 06/28/2022        Reason for Visit    Chief Complaint   Patient presents with     Oncology Clinic Visit     Malignant neoplasm of upper-inner quadrant of right breast in female, estrogen receptor positive       Assessment and Plan    Cancer Staging  Malignant neoplasm of upper-inner quadrant of right breast in female, estrogen receptor positive (H)  Staging form: Breast, AJCC 8th Edition  - Pathologic stage from 3/18/2020: Stage IB (pT3, pN1a, cM0, G2, ER+, TX+, HER2-, Oncotype DX score: 24) - Signed by Lisa Epps APRN CNP on 6/28/2022    1. Breast cancer: currently on anastrozole, which she started in December 2020.  Patient is overall tolerating this pretty well.  We will continue her current regimen.  She will continue to be seen every 6 months.  Hopefully we will have a new physician at that time and she can see the new doctor.  If not she will see one of our current providers.  She does spend most of her time in [place] so we will work around her schedule when she is back in town.  Patient had some questions today about doing routine imaging and we explained why we do not do that for surveillance at this point.  Encouraged her to call if she has any new worrisome symptoms.    2. Osteopenia: High risk for worsening bone density with the aromatase inhibitor.  She will continue calcium and vitamin D supplement.  She states she had a bone density scan done in [place].  I told her she should do those every 2 years while she is on the anastrozole.  Encouraged her to have them send us a copy when she gets it done.  Encourage also to participate in weightbearing exercises. Good calcium in diet.     3.  Mild discomfort/muscle spasm in right chest: Patient states this has been going on for a while.  Some days it is worse.  I do not feel anything abnormal except scar tissue which is common after  she has had her implants removed.  She did have a chest CT done 1 year ago and it did not show any worrisome symptoms and she was having the symptom at that time.  No need for further imaging.  Continue to monitor and if it continues to get worse if she notices any changes in the chest wall to give us a call.    ECOG Performance    0 - Independent    Distress Screening (within last 30 days)    No data recorded     Pain  Pain Score: No Pain (0)    Problem List    Patient Active Problem List   Diagnosis     Malignant neoplasm of upper-inner quadrant of right breast in female, estrogen receptor positive (H)     Chemotherapy-induced peripheral neuropathy (H)     Benign paroxysmal positional vertigo, unspecified laterality        ______________________________________________________________________________    History of Present Illness    DIAGNOSIS: inverted nipple July 2019. Biopsy  invasive ductal carcinoma, grade 2 that was strongly estrogen and progesterone receptor positive and HER-2 negative  Final pathology showed a 7.5 cm, grade 2, invasive ductal carcinoma, which also had some lymphovascular invasion.  1 of the 3 lymph nodes was positive for a 1.1 cm metastatic focus.  20% of the tumor was a solid type DCIS.  Final stage was pT3, pN1a, cM0.  Estrogen and progesterone receptor strongly positive and HER-2 negative.  The Oncotype DX score came back at 24.     TREATMENT:   -She had a bilateral mastectomy and right-sided sentinel lymph node dissection with tissue expander placement done on 3/18/2020.  The posterior margin of the sample on the right side came back positive and so she had to have a re-resection done on 3/30/2020.  -chemotherapy with dose dense AC for 4 cycles followed by Taxol given once weekly for 12 weeks.  Chemotherapy was started on 5/8/2020. She completed the whole course of chemotherapy on 9/18/2020 with the 12th dose of Taxol.  -She completed adjuvant radiation on 12/3/2020.    -She started  Arimidex treatment on 12/4/2020.    INTERIM HISTORY:   Patient is here today for 6-month follow-up visit.  Overall she states she is doing well and really has no major complaints.  She says she does have muscle spasms lateral to where her implants were removed and along the edge of where her breast was.  This has been going on for a year to really since her treatment.  She says some days they are worse and its pretty severe and then other days it does not happen at all.  She has not noticed any changes in the skin or in the tissue.  She still think she is getting have some sort of reconstruction done since her implants had to be removed with infection but she is not ready to have more surgery at this time and is not sure what she wants to do.  She denies any major issues with anastrozole.  She has mild joint stiffness and achiness but had that before and it is only mildly worse.  Does have some vaginal dryness as well.    Review of Systems    Pertinent items are noted in HPI.    Past History    Past Medical History:   Diagnosis Date     Anxiety      Asthma      Breast cancer (H)      Carpal tunnel syndrome      Depression      Exercise induced bronchospasm      Insomnia      Migraine      Osteoarthritis of knees, bilateral      Recurrent cold sores      Temporomandibular joint pain 03/01/1993    Uses a mouthguard.       PHYSICAL EXAM  BP (!) 141/92 (Patient Position: Sitting)   Pulse 94   Temp 99  F (37.2  C) (Oral)   Resp 16   Wt 89.3 kg (196 lb 12.8 oz)   SpO2 95%   BMI 36.00 kg/m      GENERAL: no acute distress. Cooperative in conversation. Here alone. Mask on  RESP: Regular respiratory rate. No expiratory wheezes   MUSCULOSKELETAL: no bilateral leg swelling  NEURO: non focal. Alert and oriented x3.   PSYCH: within normal limits. No depression or anxiety.  SKIN: exposed skin is dry intact.   BREAST/CHEST WALL: Patient is status post bilateral mastectomies with implants and now the implants have been  removed.  She does have a fair amount of scar tissue.  Right side greater than left side.  There is no sensitivity to palpation.  There is no abnormal rashes or lesions noted.    Lab Results    No results found for this or any previous visit (from the past 168 hour(s)).    Imaging    No results found.  Total time spent with patient in face to face time, chart review and documentation was 30 minutes.        Signed by: RANDEE Diez CNP

## 2022-06-28 NOTE — PROGRESS NOTES
"Oncology Rooming Note    June 28, 2022 1:07 PM   Janeth Mathias is a 56 year old female who presents for:    Chief Complaint   Patient presents with     Oncology Clinic Visit     Malignant neoplasm of upper-inner quadrant of right breast in female, estrogen receptor positive     Initial Vitals: BP (!) 141/92 (Patient Position: Sitting)   Pulse 94   Temp 99  F (37.2  C) (Oral)   Resp 16   Wt 89.3 kg (196 lb 12.8 oz)   SpO2 95%   BMI 36.00 kg/m   Estimated body mass index is 36 kg/m  as calculated from the following:    Height as of 10/9/20: 1.575 m (5' 2\").    Weight as of this encounter: 89.3 kg (196 lb 12.8 oz). Body surface area is 1.98 meters squared.  No Pain (0) Comment: Data Unavailable   No LMP recorded. Patient is perimenopausal.  Allergies reviewed: Yes  Medications reviewed: Yes    Medications: Medication refills not needed today.  Pharmacy name entered into [facility]:    [id number] - [place] - [address]  AT [facility] & [address] [place] [address]  [id number] - [place] - [address] AT [address]    Clinical concerns: 6 month follow up.        Andrew Perea LPN              "

## 2022-07-17 ENCOUNTER — HEALTH MAINTENANCE LETTER (OUTPATIENT)
Age: 57
End: 2022-07-17

## 2022-09-25 ENCOUNTER — HEALTH MAINTENANCE LETTER (OUTPATIENT)
Age: 57
End: 2022-09-25

## 2022-12-14 ENCOUNTER — ONCOLOGY VISIT (OUTPATIENT)
Dept: ONCOLOGY | Facility: CLINIC | Age: 57
End: 2022-12-14
Attending: NURSE PRACTITIONER
Payer: COMMERCIAL

## 2022-12-14 ENCOUNTER — LAB (OUTPATIENT)
Dept: INFUSION THERAPY | Facility: CLINIC | Age: 57
End: 2022-12-14
Attending: NURSE PRACTITIONER
Payer: COMMERCIAL

## 2022-12-14 VITALS
OXYGEN SATURATION: 97 % | BODY MASS INDEX: 36.71 KG/M2 | HEART RATE: 85 BPM | RESPIRATION RATE: 16 BRPM | WEIGHT: 199.5 LBS | DIASTOLIC BLOOD PRESSURE: 86 MMHG | HEIGHT: 62 IN | SYSTOLIC BLOOD PRESSURE: 145 MMHG

## 2022-12-14 DIAGNOSIS — Z85.3 PERSONAL HISTORY OF MALIGNANT NEOPLASM OF BREAST: Primary | ICD-10-CM

## 2022-12-14 DIAGNOSIS — C50.211 MALIGNANT NEOPLASM OF UPPER-INNER QUADRANT OF RIGHT BREAST IN FEMALE, ESTROGEN RECEPTOR POSITIVE (H): ICD-10-CM

## 2022-12-14 DIAGNOSIS — Z17.0 MALIGNANT NEOPLASM OF UPPER-INNER QUADRANT OF RIGHT BREAST IN FEMALE, ESTROGEN RECEPTOR POSITIVE (H): ICD-10-CM

## 2022-12-14 DIAGNOSIS — D64.9 ANEMIA, UNSPECIFIED TYPE: ICD-10-CM

## 2022-12-14 DIAGNOSIS — Z79.811 AROMATASE INHIBITOR USE: ICD-10-CM

## 2022-12-14 LAB
ERYTHROCYTE [DISTWIDTH] IN BLOOD BY AUTOMATED COUNT: 11.9 % (ref 10–15)
HCT VFR BLD AUTO: 39.9 % (ref 35–47)
HGB BLD-MCNC: 12.9 G/DL (ref 11.7–15.7)
MCH RBC QN AUTO: 29.5 PG (ref 26.5–33)
MCHC RBC AUTO-ENTMCNC: 32.3 G/DL (ref 31.5–36.5)
MCV RBC AUTO: 91 FL (ref 78–100)
PLATELET # BLD AUTO: 214 10E3/UL (ref 150–450)
RBC # BLD AUTO: 4.37 10E6/UL (ref 3.8–5.2)
WBC # BLD AUTO: 6.1 10E3/UL (ref 4–11)

## 2022-12-14 PROCEDURE — 99212 OFFICE O/P EST SF 10 MIN: CPT | Performed by: NURSE PRACTITIONER

## 2022-12-14 PROCEDURE — G0463 HOSPITAL OUTPT CLINIC VISIT: HCPCS

## 2022-12-14 PROCEDURE — 99214 OFFICE O/P EST MOD 30 MIN: CPT | Performed by: NURSE PRACTITIONER

## 2022-12-14 PROCEDURE — 85027 COMPLETE CBC AUTOMATED: CPT | Performed by: NURSE PRACTITIONER

## 2022-12-14 PROCEDURE — 36591 DRAW BLOOD OFF VENOUS DEVICE: CPT | Performed by: NURSE PRACTITIONER

## 2022-12-14 RX ORDER — ROSUVASTATIN CALCIUM 10 MG/1
20 TABLET, COATED ORAL DAILY
COMMUNITY
Start: 2022-10-02

## 2022-12-14 RX ORDER — LEVOTHYROXINE SODIUM 75 UG/1
TABLET ORAL
COMMUNITY
Start: 2022-10-02

## 2022-12-14 RX ORDER — ANASTROZOLE 1 MG/1
1 TABLET ORAL DAILY
Qty: 90 TABLET | Refills: 3 | Status: SHIPPED | OUTPATIENT
Start: 2022-12-14 | End: 2023-10-09

## 2022-12-14 ASSESSMENT — PAIN SCALES - GENERAL: PAINLEVEL: NO PAIN (0)

## 2022-12-14 NOTE — PATIENT INSTRUCTIONS
Plz get DEXA scan results.    9007-4882 mg calcium and 1210-8181 international unit(s) vitamin D supplement daily

## 2022-12-14 NOTE — PROGRESS NOTES
[facility] Hematology and Oncology Progress Note    Patient: Janeth Mathias  MRN: [id number]  Date of Service: Dec 14, 2022       [phone]  Reason for Visit:    Scheduled f/up.    Assessment and Plan:    1.  Stage IB, G2, ER & CA (+), HER2(-), invasive ductal carcinoma UIQ (R) breast ... going on 3 years s/p bilateral mastectomies, followed by adjuvant chemotherapy and EBRT, on adjuvant aromatase inhibitor (AI) therapy.    57 year old     Ms Mathias presents alone in good spirits.  Overall she is doing really well.  No known side effects from AI therapy.  She had baseline mild hot flashes and arthralgias prior to starting AI therapy and they have not changed.  Appetite good - weight up 10-15 pounds the past couple years.  Her foot neuropathies post-chemotherapy continue to decrease, manifest as tingliness and pain with excessive walking.  The tingliness in her finger tips has mostly resolved.  She has a history of vertigo treated with Meclazine - no episodes recently.  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  She denies cough, fevers or night sweats, new or concerning lumps or bumps, unusual headaches, visual or mentation disturbance, mouth sores, nausea or vomiting, abdominal or chest pain, bladder issues, blood in stool or black stools, skin rash.  She lives in [place], but comes to [place] quarterly for her employment which is when she schedules her f/up with us.     CBC - WNL (resolved anemia).    Clinically BOBBY:    Continue adjuvant AI therapy, anticipating 5-years adjuvant endocrine therapy (December, 2025).    Continue self chest wall examinations.    Osteopenia - high risk for worsening bone density on aromatase inhibitor therapy.      Continue [id number] mg calcium and 3025-8034 international unit(s) vitamin D daily supplement.  Good calcium in diet.     She states she had a bone density scan done in [place]- will obtain documents.      Reminded need for every 2 year DEXA scans while on  anastrozole therapy.    Encouraged to participate in weightbearing exercises.     Weight gain -  encouraged weight management through diet and exercise.    6-month f/up.  No labs or imaging.    Oncologic History:    1.      Malignant neoplasm UIQ (R) female breast          Pathologic Stage IB (pT3, pN1a(sn), cM0)        G2      ER+ @ 95%      UT+ @ 75%       HER2-        Oncotype DX score @ 24    2019, July - noted an inverted nipple on the right side.  ? 2019, November - noted a lump in the right breast.    ? 02/06/2020 diagnostic (B) mammogram and US showed a 5.4 cm mass within the right breast at the 12 o'clock position.      Core needle biopsy - c/w invasive ductal carcinoma, grade 2, strongly estrogen and progesterone receptor positive and HER-2 negative.      03/18/2020 - bilateral mastectomies and right-sided sentinel lymph node dissection with tissue expanders.    ? Pathology showed a 7.5 cm, grade 2, invasive ductal carcinoma, which also had some lymphovascular invasion.  1 of the 3 lymph nodes was positive for a 1.1 cm metastatic focus.  20% of the tumor was a solid type DCIS.    ? The posterior margin of the sample on the right side came back positive.    03/30/2020 - Reresection and [id number] for chemotherapy.  ? Consult with Dr Barrera - recommended adjuvant chemotherapy with dose dense Adriamycin + Cytoxan for 4 cycles followed by Taxol given once weekly for 12 weeks  followed by a radiation oncology consultation and adjuvant estrogen blockade therapy.    05/08 - 09/18/2020 - completed 4 cycles Adriamycin + Cytoxan followed by 12 weekly doses Taxol adjuvant chemotherapy.    12/03/2020 - completed 6040 cGy / 33 fx adjuvant EBRT.    12/04/2020 - started adjuvant aromatase inhibitor (AI) therapy with anastrozole, 1 mg/day.     ECOG Performance:    0 - Independent    Pain:    Pain Score: No Pain (0)    Encounter Diagnoses:    Problem List Items Addressed This Visit        Other    Malignant neoplasm  of upper-inner quadrant of right breast in female, estrogen receptor positive (H)    Relevant Medications    anastrozole (ARIMIDEX) 1 MG tablet    Other Relevant Orders    CBC with platelets (Completed)   Other Visit Diagnoses     Personal history of malignant neoplasm of breast    -  Primary    Anemia, unspecified type        Relevant Orders    CBC with platelets (Completed)    Aromatase inhibitor use                 32 minutes spent on the date of the encounter doing chart review, history and exam, documentation and further activities as noted above.    Neptali Reynaga, CNP     CC: Ella Moreno MD   ______________________________________________________________________    Review of systems.    No fever or night sweats.  No loss of weight.  No lumps or bumps anywhere.  No unusual headaches or eyesight issues.  No dizziness.  No bleeding from the nose.  No sores in the mouth. No problems with swallowing.  No chest pain. No shortness of breath. No cough.  No abdominal pain. No nausea or vomiting.  No diarrhea or constipation.  No blood in stool or black colored stools.  No problems passing urine.  No numbness or tingling in hands or feet.  No skin rashes.    A 14 point review of systems is otherwise negative.    Past History:    Past Medical History:   Diagnosis Date     Anxiety      Asthma      Breast cancer (H)      Carpal tunnel syndrome      Depression      Exercise induced bronchospasm      Insomnia      Migraine      Osteoarthritis of knees, bilateral      Recurrent cold sores      Temporomandibular joint pain 03/01/1993    Uses a mouthguard.       Past Surgical History:   Procedure Laterality Date     ABDOMINOPLASTY  06/01/2019     ARTHROPLASTY KNEE UNICOMPARTMENT Right 08/14/2019     AS REMOVAL OF BREAST IMPLANT Right 07/06/2021     AS REMOVAL OF BREAST IMPLANT Left 09/02/2021     COLONOSCOPY  11/01/2017    Repeat in 10 years.     HC BREAST RECONSTRUC W TISS EXPANDR Bilateral 03/18/2020    Procedure:  "BILATERAL BREAST RECONSTRUCTION WITH TISSUE EXPANDERS;  Surgeon: Sunni Hunter MD;  Location: [facility];  Service: Plastics     NM SENTINEL NODE INJECTION  03/18/2020     PARTIAL KNEE ARTHROPLASTY Left 07/16/2018     UT INSERT TISSUE EXPANDER(S) Right 03/30/2020    Procedure: REVISION RIGHT BREAST RECONSTRUCTION WITH PLACEMENT SUBMUSCULAR  TISSUE EXPANDER;  Surgeon: Sunni Hunter MD;  Location: [facility] OR;  Service: Plastics     UT MASTECTOMY, SIMPLE, COMPLETE Bilateral 03/18/2020    Procedure: Bilateral mastectomies;  Surgeon: Kayla Beaver MD;  Location: [facility] OR;  Service: General     UT MASTECTOMY, SIMPLE, COMPLETE Right 03/30/2020    Procedure: Revision Right Mastectomy;  Surgeon: Kayla Beaver MD;  Location: [facility] OR;  Service: General     UT RMVL LIZABETH CTR VAD W/SUBQ PORT/ CTR/PRPH INSJ N/A 10/09/2020    Procedure: Port Removal;  Surgeon: Kayla Beaver MD;  Location: [facility];  Service: General     RELEASE CARPAL TUNNEL Right 07/14/2008     TONSILLECTOMY & ADENOIDECTOMY  05/01/1994     TUBAL LIGATION  01/01/1993     TUNNELED VENOUS CATHETER PLACEMENT Left 03/30/2020    Procedure: Port Placement on the Left;  Surgeon: Kayla Beaver MD;  Location: [facility];  Service: General     US BREAST CORE BIOPSY RIGHT Right 02/10/2020     Physical Exam:    BP (!) 145/86   Pulse 85   Resp 16   Ht 1.575 m (5' 2.01\")   Wt 90.5 kg (199 lb 8 oz)   SpO2 97%   BMI 36.48 kg/m      GENERAL:        Very pleasant.  Alert and oriented.  Comfortable.  In no acute distress.       HEENT:                 Anicteric sclera.  EMMA.  EOMI.  No pallor.  No mucosal lesions or tonsillar enlargement.     LYMPH NODES:      No palpable cervical, axillary or inguinal lymphadenopathy.     CHEST:                Lungs clear to auscultation bilaterally.     BREASTS:             C/W bilateral mastectomies with implants.  No concerning lumps or bumps or skin changes. " (B) negative axillas.                              Patient declined female  during exam.     CVS:                       S1 and S2 aheard.  Regular rate and rhythm.  No murmur, gallop or rub heard.  No peripheral edema.     ABDOMEN:            Soft.  Not tender.  Not distended.  No palpable organomegaly, masses or ascites.     EXTREMITIES:       Warm.  No edema     SKIN:                   No rash, bruising or purpura noted. Full head of hair.    Lab Results    Reviewed with patient    Recent Results (from the past 24 hour(s))   CBC with platelets    Collection Time: 12/14/22  3:47 PM   Result Value Ref Range    WBC Count 6.1 4.0 - 11.0 10e3/uL    RBC Count 4.37 3.80 - 5.20 10e6/uL    Hemoglobin 12.9 11.7 - 15.7 g/dL    Hematocrit 39.9 35.0 - 47.0 %    MCV 91 78 - 100 fL    MCH 29.5 26.5 - 33.0 pg    MCHC 32.3 31.5 - 36.5 g/dL    RDW 11.9 10.0 - 15.0 %    Platelet Count 214 150 - 450 10e3/uL     Imaging:    No results found.

## 2022-12-14 NOTE — LETTER
"    12/14/2022         RE: Janeth Matihas  [address]  [place] [address]        Dear Colleague,    Thank you for referring your patient, Janeth Mathias, to the [facility] [place]. Please see a copy of my visit note below.    Oncology Rooming Note    December 14, 2022 3:10 PM   Janeth Mathias is a 57 year old female who presents for:    Chief Complaint   Patient presents with     Oncology Clinic Visit     Malignant neoplasm of upper-inner quadrant of right breast in female, estrogen receptor positive     Initial Vitals: BP (!) 143/84 (BP Location: Right arm, Patient Position: Sitting, Cuff Size: Adult Large)   Pulse 85   Resp 16   Ht 1.575 m (5' 2.01\")   Wt 90.5 kg (199 lb 8 oz)   SpO2 97%   BMI 36.48 kg/m   Estimated body mass index is 36.48 kg/m  as calculated from the following:    Height as of this encounter: 1.575 m (5' 2.01\").    Weight as of this encounter: 90.5 kg (199 lb 8 oz). Body surface area is 1.99 meters squared.  No Pain (0) Comment: Data Unavailable   No LMP recorded. Patient is perimenopausal.  Allergies reviewed: Yes  Medications reviewed: Yes    Medications: MEDICATION REFILLS NEEDED TODAY. Provider was notified.  Pharmacy name entered into [employer]:    [id number] - [place], [place] - [address]  AT [address]  [id number] - [place] - [address] AT [address]    Clinical concerns: 6 month follow up      Adamaris Cunningham MA              [facility] Hematology and Oncology Progress Note    Patient: Janeth Mathias  MRN: [id number]  Date of Service: Dec 14, 2022       [phone]  Reason for Visit:    Scheduled f/up.    Assessment and Plan:    1.  Stage IB, G2, ER & SC (+), HER2(-), invasive ductal carcinoma UIQ (R) breast ... going on 3 years s/p bilateral mastectomies, followed by adjuvant chemotherapy and EBRT, on adjuvant aromatase inhibitor (AI) therapy.    57 year old     Ms Mathias presents " alone in good spirits.  Overall she is doing really well.  No known side effects from AI therapy.  She had baseline mild hot flashes and arthralgias prior to starting AI therapy and they have not changed.  Appetite good - weight up 10-15 pounds the past couple years.  Her foot neuropathies post-chemotherapy continue to decrease, manifest as tingliness and pain with excessive walking.  The tingliness in her finger tips has mostly resolved.  She has a history of vertigo treated with Meclazine - no episodes recently.  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  She denies cough, fevers or night sweats, new or concerning lumps or bumps, unusual headaches, visual or mentation disturbance, mouth sores, nausea or vomiting, abdominal or chest pain, bladder issues, blood in stool or black stools, skin rash.  She lives in [place], but comes to [place] quarterly for her employment which is when she schedules her f/up with us.     CBC - WNL (resolved anemia).    Clinically BOBBY:    Continue adjuvant AI therapy, anticipating 5-years adjuvant endocrine therapy (December, 2025).    Continue self chest wall examinations.    Osteopenia - high risk for worsening bone density on aromatase inhibitor therapy.      Continue 8632-8976 mg calcium and 8449-3614 international unit(s) vitamin D daily supplement.  Good calcium in diet.     She states she had a bone density scan done in [place]- will obtain documents.      Reminded need for every 2 year DEXA scans while on anastrozole therapy.    Encouraged to participate in weightbearing exercises.     Weight gain -  encouraged weight management through diet and exercise.    6-month f/up.  No labs or imaging.    Oncologic History:    1.      Malignant neoplasm UIQ (R) female breast          Pathologic Stage IB (pT3, pN1a(sn), cM0)        G2      ER+ @ 95%      LA+ @ 75%       HER2-        Oncotype DX score @ 24 2019, July - noted an inverted nipple on the right side.  ? 2019,  November - noted a lump in the right breast.    ? 02/06/2020 diagnostic (B) mammogram and US showed a 5.4 cm mass within the right breast at the 12 o'clock position.      Core needle biopsy - c/w invasive ductal carcinoma, grade 2, strongly estrogen and progesterone receptor positive and HER-2 negative.      03/18/2020 - bilateral mastectomies and right-sided sentinel lymph node dissection with tissue expanders.    ? Pathology showed a 7.5 cm, grade 2, invasive ductal carcinoma, which also had some lymphovascular invasion.  1 of the 3 lymph nodes was positive for a 1.1 cm metastatic focus.  20% of the tumor was a solid type DCIS.    ? The posterior margin of the sample on the right side came back positive.    03/30/2020 - Reresection and Pkae-S-Nemqhqntoi for chemotherapy.  ? Consult with Dr Barrera - recommended adjuvant chemotherapy with dose dense Adriamycin + Cytoxan for 4 cycles followed by Taxol given once weekly for 12 weeks  followed by a radiation oncology consultation and adjuvant estrogen blockade therapy.    05/08 - 09/18/2020 - completed 4 cycles Adriamycin + Cytoxan followed by 12 weekly doses Taxol adjuvant chemotherapy.    12/03/2020 - completed 6040 cGy / 33 fx adjuvant EBRT.    12/04/2020 - started adjuvant aromatase inhibitor (AI) therapy with anastrozole, 1 mg/day.     ECOG Performance:    0 - Independent    Pain:    Pain Score: No Pain (0)    Encounter Diagnoses:    Problem List Items Addressed This Visit        Other    Malignant neoplasm of upper-inner quadrant of right breast in female, estrogen receptor positive (H)    Relevant Medications    anastrozole (ARIMIDEX) 1 MG tablet    Other Relevant Orders    CBC with platelets (Completed)   Other Visit Diagnoses     Personal history of malignant neoplasm of breast    -  Primary    Anemia, unspecified type        Relevant Orders    CBC with platelets (Completed)    Aromatase inhibitor use                 32 minutes spent on the date of the  encounter doing chart review, history and exam, documentation and further activities as noted above.    Neptali Reynaga, CNP     CC: Ella Moreno MD   ______________________________________________________________________    Review of systems.    No fever or night sweats.  No loss of weight.  No lumps or bumps anywhere.  No unusual headaches or eyesight issues.  No dizziness.  No bleeding from the nose.  No sores in the mouth. No problems with swallowing.  No chest pain. No shortness of breath. No cough.  No abdominal pain. No nausea or vomiting.  No diarrhea or constipation.  No blood in stool or black colored stools.  No problems passing urine.  No numbness or tingling in hands or feet.  No skin rashes.    A 14 point review of systems is otherwise negative.    Past History:    Past Medical History:   Diagnosis Date     Anxiety      Asthma      Breast cancer (H)      Carpal tunnel syndrome      Depression      Exercise induced bronchospasm      Insomnia      Migraine      Osteoarthritis of knees, bilateral      Recurrent cold sores      Temporomandibular joint pain 03/01/1993    Uses a mouthguard.       Past Surgical History:   Procedure Laterality Date     ABDOMINOPLASTY  06/01/2019     ARTHROPLASTY KNEE UNICOMPARTMENT Right 08/14/2019     AS REMOVAL OF BREAST IMPLANT Right 07/06/2021     AS REMOVAL OF BREAST IMPLANT Left 09/02/2021     COLONOSCOPY  11/01/2017    Repeat in 10 years.     HC BREAST RECONSTRUC W TISS EXPANDR Bilateral 03/18/2020    Procedure: BILATERAL BREAST RECONSTRUCTION WITH TISSUE EXPANDERS;  Surgeon: Sunni Hunter MD;  Location: [facility] OR;  Service: Plastics     NM SENTINEL NODE INJECTION  03/18/2020     PARTIAL KNEE ARTHROPLASTY Left 07/16/2018     AZ INSERT TISSUE EXPANDER(S) Right 03/30/2020    Procedure: REVISION RIGHT BREAST RECONSTRUCTION WITH PLACEMENT SUBMUSCULAR  TISSUE EXPANDER;  Surgeon: Sunni Hunter MD;  Location: [facility] OR;  Service: Plastics  "    TX MASTECTOMY, SIMPLE, COMPLETE Bilateral 03/18/2020    Procedure: Bilateral mastectomies;  Surgeon: Kayla Beaver MD;  Location: [facility];  Service: General     TX MASTECTOMY, SIMPLE, COMPLETE Right 03/30/2020    Procedure: Revision Right Mastectomy;  Surgeon: Kayla Beaver MD;  Location: [facility];  Service: General     TX RMVL LIZABETH CTR VAD W/SUBQ PORT/ CTR/PRPH INSJ N/A 10/09/2020    Procedure: Port Removal;  Surgeon: Kayla Beaver MD;  Location: [facility];  Service: General     RELEASE CARPAL TUNNEL Right 07/14/2008     TONSILLECTOMY & ADENOIDECTOMY  05/01/1994     TUBAL LIGATION  01/01/1993     TUNNELED VENOUS CATHETER PLACEMENT Left 03/30/2020    Procedure: Port Placement on the Left;  Surgeon: Kayla Beaver MD;  Location: [facility];  Service: General     US BREAST CORE BIOPSY RIGHT Right 02/10/2020     Physical Exam:    BP (!) 145/86   Pulse 85   Resp 16   Ht 1.575 m (5' 2.01\")   Wt 90.5 kg (199 lb 8 oz)   SpO2 97%   BMI 36.48 kg/m      GENERAL:        Very pleasant.  Alert and oriented.  Comfortable.  In no acute distress.       HEENT:                 Anicteric sclera.  EMMA.  EOMI.  No pallor.  No mucosal lesions or tonsillar enlargement.     LYMPH NODES:      No palpable cervical, axillary or inguinal lymphadenopathy.     CHEST:                Lungs clear to auscultation bilaterally.     BREASTS:             C/W bilateral mastectomies with implants.  No concerning lumps or bumps or skin changes. (B) negative axillas.                              Patient declined female  during exam.     CVS:                       S1 and S2 aheard.  Regular rate and rhythm.  No murmur, gallop or rub heard.  No peripheral edema.     ABDOMEN:            Soft.  Not tender.  Not distended.  No palpable organomegaly, masses or ascites.     EXTREMITIES:       Warm.  No edema     SKIN:                   No rash, bruising or purpura noted. Full head of " hair.    Lab Results    Reviewed with patient    Recent Results (from the past 24 hour(s))   CBC with platelets    Collection Time: 12/14/22  3:47 PM   Result Value Ref Range    WBC Count 6.1 4.0 - 11.0 10e3/uL    RBC Count 4.37 3.80 - 5.20 10e6/uL    Hemoglobin 12.9 11.7 - 15.7 g/dL    Hematocrit 39.9 35.0 - 47.0 %    MCV 91 78 - 100 fL    MCH 29.5 26.5 - 33.0 pg    MCHC 32.3 31.5 - 36.5 g/dL    RDW 11.9 10.0 - 15.0 %    Platelet Count 214 150 - 450 10e3/uL     Imaging:    No results found.          Again, thank you for allowing me to participate in the care of your patient.        Sincerely,        Neptali Reynaga, CNP

## 2022-12-14 NOTE — PROGRESS NOTES
"Oncology Rooming Note    December 14, 2022 3:10 PM   Janeth Mathias is a 57 year old female who presents for:    Chief Complaint   Patient presents with     Oncology Clinic Visit     Malignant neoplasm of upper-inner quadrant of right breast in female, estrogen receptor positive     Initial Vitals: BP (!) 143/84 (BP Location: Right arm, Patient Position: Sitting, Cuff Size: Adult Large)   Pulse 85   Resp 16   Ht 1.575 m (5' 2.01\")   Wt 90.5 kg (199 lb 8 oz)   SpO2 97%   BMI 36.48 kg/m   Estimated body mass index is 36.48 kg/m  as calculated from the following:    Height as of this encounter: 1.575 m (5' 2.01\").    Weight as of this encounter: 90.5 kg (199 lb 8 oz). Body surface area is 1.99 meters squared.  No Pain (0) Comment: Data Unavailable   No LMP recorded. Patient is perimenopausal.  Allergies reviewed: Yes  Medications reviewed: Yes    Medications: MEDICATION REFILLS NEEDED TODAY. Provider was notified.  Pharmacy name entered into [facility]:    [id number] - [place] - [address]  AT [facility] & [address] [place] [address]  [id number] [place] - [address] AT [facility] OF [address] [place] [address]    Clinical concerns: 6 month follow up      Adamaris Cunningham MA            "

## 2023-08-05 ENCOUNTER — HEALTH MAINTENANCE LETTER (OUTPATIENT)
Age: 58
End: 2023-08-05

## 2023-10-09 ENCOUNTER — ONCOLOGY VISIT (OUTPATIENT)
Dept: ONCOLOGY | Facility: CLINIC | Age: 58
End: 2023-10-09
Attending: NURSE PRACTITIONER
Payer: COMMERCIAL

## 2023-10-09 VITALS
BODY MASS INDEX: 35.63 KG/M2 | SYSTOLIC BLOOD PRESSURE: 131 MMHG | HEIGHT: 62 IN | DIASTOLIC BLOOD PRESSURE: 87 MMHG | HEART RATE: 89 BPM | WEIGHT: 193.6 LBS | OXYGEN SATURATION: 98 %

## 2023-10-09 DIAGNOSIS — Z79.811 AROMATASE INHIBITOR USE: ICD-10-CM

## 2023-10-09 DIAGNOSIS — Z17.0 MALIGNANT NEOPLASM OF UPPER-INNER QUADRANT OF RIGHT BREAST IN FEMALE, ESTROGEN RECEPTOR POSITIVE (H): Primary | ICD-10-CM

## 2023-10-09 DIAGNOSIS — T45.1X5A CHEMOTHERAPY-INDUCED PERIPHERAL NEUROPATHY (H): ICD-10-CM

## 2023-10-09 DIAGNOSIS — C50.211 MALIGNANT NEOPLASM OF UPPER-INNER QUADRANT OF RIGHT BREAST IN FEMALE, ESTROGEN RECEPTOR POSITIVE (H): Primary | ICD-10-CM

## 2023-10-09 DIAGNOSIS — G62.0 CHEMOTHERAPY-INDUCED PERIPHERAL NEUROPATHY (H): ICD-10-CM

## 2023-10-09 PROCEDURE — 99213 OFFICE O/P EST LOW 20 MIN: CPT | Performed by: NURSE PRACTITIONER

## 2023-10-09 PROCEDURE — 99214 OFFICE O/P EST MOD 30 MIN: CPT | Performed by: NURSE PRACTITIONER

## 2023-10-09 RX ORDER — ANASTROZOLE 1 MG/1
1 TABLET ORAL DAILY
Qty: 90 TABLET | Refills: 3 | Status: SHIPPED | OUTPATIENT
Start: 2023-10-09

## 2023-10-09 ASSESSMENT — PAIN SCALES - GENERAL: PAINLEVEL: NO PAIN (0)

## 2023-10-09 NOTE — PROGRESS NOTES
[facility] Hematology and Oncology Progress Note    Patient: Janeth Mathias  MRN: [id number]  Date of Service: Oct 9, 2023       [phone]  Reason for Visit:    Scheduled f/up.    Assessment and Plan:    1.  Stage IB, G2, ER & CT (+), HER2(-), invasive ductal carcinoma UIQ (R) breast ... 3.5+ years s/p bilateral mastectomies, followed by adjuvant chemotherapy and EBRT, on adjuvant aromatase inhibitor (AI) therapy.  58 year old   Ms Mathias presents alone in good spirits.  Overall she is doing really well.  No known side effects from AI therapy.  She had baseline mild hot flashes and arthralgias prior to starting AI therapy and they have not changed.  Appetite good - weight intentionally down 5+ pounds the past year.  Trace foot neuropathies post-chemotherapy continue to decrease, manifest as tingliness and pain with excessive walking.  The tingliness in her finger tips has mostly resolved, also had carpal tunnel - impoved with surgery.  She has a history of vertigo treated with Meclazine - no episodes recently.  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  She denies cough, fevers or night sweats, new or concerning lumps or bumps, unusual headaches, visual or mentation disturbance, mouth sores, nausea or vomiting, abdominal or chest pain, bladder issues, blood in stool or black stools, skin rash.  She lives in [place], but comes to [place] quarterly for her employment which is when she schedules her f/up with us.   Clinically BOBBY 3.5+ years s/p bilateral mastectomies, followed by adjuvant chemotherapy and EBRT, on adjuvant aromatase inhibitor (AI) therapy:  Continue adjuvant AI therapy, anticipating 5-years adjuvant endocrine therapy (December, 2025).  Continue self chest wall examinations.  Osteopenia - high risk for worsening bone density on aromatase inhibitor therapy.    Continue 0434-3080 mg calcium and 1150-9613  international unit(s) vitamin D daily supplement.  Good calcium in diet.   She states she had a bone density scan done in [place]- will obtain documents.    Reminded need for every 2 year DEXA scans while on anastrozole therapy.  Encouraged to participate in weightbearing exercises.   Encouraged continued weight management through diet and exercise.  6-month f/up.  No labs or imaging.    Oncologic History:    1.      Malignant neoplasm UIQ (R) female breast          Pathologic Stage IB (pT3, pN1a(sn), cM0)        G2     ER+ @ 95%     GA+ @ 75%       HER2-        Oncotype DX score @ 24  2019, July - noted an inverted nipple on the right side.  2019, November - noted a lump in the right breast.    02/06/2020 diagnostic (B) mammogram and US showed a 5.4 cm mass within the right breast at the 12 o'clock position.    Core needle biopsy - c/w invasive ductal carcinoma, grade 2, strongly estrogen and progesterone receptor positive and HER-2 negative.    03/18/2020 - bilateral mastectomies and right-sided sentinel lymph node dissection with tissue expanders.    Pathology showed a 7.5 cm, grade 2, invasive ductal carcinoma, which also had some lymphovascular invasion.  1 of the 3 lymph nodes was positive for a 1.1 cm metastatic focus.  20% of the tumor was a solid type DCIS.    The posterior margin of the sample on the right side came back positive.  03/30/2020 - Reresection and Ssqb-D-Exypdtckfe for chemotherapy.  Consult with Dr Barrera - recommended adjuvant chemotherapy with dose dense Adriamycin + Cytoxan for 4 cycles followed by Taxol given once weekly for 12 weeks  followed by a radiation oncology consultation and adjuvant estrogen blockade therapy.  05/08 - 09/18/2020 - completed 4 cycles Adriamycin + Cytoxan followed by 12 weekly doses Taxol adjuvant chemotherapy.  12/03/2020 - completed 6040 cGy / 33 fx adjuvant EBRT.  12/04/2020 - started adjuvant aromatase inhibitor (AI) therapy with anastrozole, 1 mg/day.      ECOG Performance:    0 - Independent    Pain:    Pain Score: No Pain (0)    Encounter Diagnoses:    Problem List Items Addressed This Visit          Nervous and Auditory    Chemotherapy-induced peripheral neuropathy        Other    Malignant neoplasm of upper-inner quadrant of right breast in female, estrogen receptor positive (H) - Primary    Relevant Medications    anastrozole (ARIMIDEX) 1 MG tablet     Other Visit Diagnoses       Aromatase inhibitor use                   32 minutes of time were spent on the date of this encounter with chart review, face to face time with the patient, examination, recommendations, documentation, and communication of the plan of care to the care team.    Neptali Reynaga, CNP     CC: Ella Moreno MD   ______________________________________________________________________    Review of Systems.    No fever or night sweats.  No concerning loss of weight.  No lumps or bumps anywhere.  No unusual headaches or eyesight issues.  No dizziness.  No bleeding from the nose.  No sores in the mouth. No problems with swallowing.  No chest pain. No shortness of breath. No cough.  No abdominal pain. No nausea or vomiting.  No diarrhea or constipation.  No blood in stool or black colored stools.  No problems passing urine.  Trace foot neuropathies post-chemotherapy continue to decrease, manifest as tingliness and pain with excessive walking.  The tingliness in her finger tips has mostly resolved, also had carpal tunnel - impoved with surgery.  No skin rashes.    A 14 point review of systems is otherwise negative.    Past History:    Past Medical History:   Diagnosis Date    Anxiety     Asthma     Breast cancer (H)     Carpal tunnel syndrome     Depression     Exercise induced bronchospasm     Insomnia     Migraine     Osteoarthritis of knees, bilateral     Recurrent cold sores     Temporomandibular joint pain 03/01/1993    Uses a mouthguard.       Past Surgical History:   Procedure Laterality  "Date    ABDOMINOPLASTY  06/01/2019    ARTHROPLASTY KNEE UNICOMPARTMENT Right 08/14/2019    AS REMOVAL OF BREAST IMPLANT Right 07/06/2021    AS REMOVAL OF BREAST IMPLANT Left 09/02/2021    COLONOSCOPY  11/01/2017    Repeat in 10 years.    HC BREAST RECONSTRUC W TISS EXPANDR Bilateral 03/18/2020    Procedure: BILATERAL BREAST RECONSTRUCTION WITH TISSUE EXPANDERS;  Surgeon: Sunni Hunter MD;  Location: [facility];  Service: Plastics    NM SENTINEL NODE INJECTION  03/18/2020    PARTIAL KNEE ARTHROPLASTY Left 07/16/2018    ID INSERT TISSUE EXPANDER(S) Right 03/30/2020    Procedure: REVISION RIGHT BREAST RECONSTRUCTION WITH PLACEMENT SUBMUSCULAR  TISSUE EXPANDER;  Surgeon: Sunni Hunter MD;  Location: [facility];  Service: Plastics    ID MASTECTOMY, SIMPLE, COMPLETE Bilateral 03/18/2020    Procedure: Bilateral mastectomies;  Surgeon: Kayla Beaver MD;  Location: [facility] OR;  Service: General    ID MASTECTOMY, SIMPLE, COMPLETE Right 03/30/2020    Procedure: Revision Right Mastectomy;  Surgeon: Kayla Beaver MD;  Location: [facility] OR;  Service: General    ID RMVL LIZABETH CTR VAD W/SUBQ PORT/ CTR/PRPH INSJ N/A 10/09/2020    Procedure: Port Removal;  Surgeon: Kayla Beaver MD;  Location: [facility] OR;  Service: General    RELEASE CARPAL TUNNEL Right 07/14/2008    TONSILLECTOMY & ADENOIDECTOMY  05/01/1994    TUBAL LIGATION  01/01/1993    TUNNELED VENOUS CATHETER PLACEMENT Left 03/30/2020    Procedure: Port Placement on the Left;  Surgeon: Kayla Beaver MD;  Location: [facility] OR;  Service: General    US BREAST CORE BIOPSY RIGHT Right 02/10/2020     Physical Exam:    /87 (BP Location: Left arm, Patient Position: Sitting, Cuff Size: Adult Large)   Pulse 89   Ht 1.575 m (5' 2.01\")   Wt 87.8 kg (193 lb 9.6 oz)   SpO2 98%   BMI 35.40 kg/m      GENERAL:        Very pleasant.  Alert and oriented.  Comfortable.  In no acute distress.       HEENT:           "       Anicteric sclera.  EMMA.  EOMI.  No pallor.  No mucosal lesions or tonsillar enlargement.     LYMPH NODES:      No palpable cervical, axillary or inguinal lymphadenopathy.     CHEST:                Lungs clear.     BREASTS:             C/W bilateral mastectomies.  No concerning lumps or bumps or skin changes. (B) negative axillas.                              Patient declined female  during exam.     CVS:                       S1 and S2 aheard.  Regular rate and rhythm.  No murmur, gallop or rub heard.  No peripheral edema.     ABDOMEN:            Soft.  Not tender or distended.  No palpable organomegaly, masses or ascites.     EXTREMITIES:       Warm.  No edema     SKIN:                   No rash, bruising or purpura noted. Full head of hair.    Lab Results    No results found for this or any previous visit (from the past 24 hour(s)).    Imaging:    No results found.

## 2023-10-09 NOTE — LETTER
10/9/2023         RE: Janeth Mathias  [address] [place] [id number]        Dear Colleague,    Thank you for referring your patient, Janeth Mathias, to the [facility] [place]. Please see a copy of my visit note below.    [facility] Hematology and Oncology Progress Note    Patient: Janeth Mathias  MRN: [id number]  Date of Service: Oct 9, 2023       [phone]  Reason for Visit:    Scheduled f/up.    Assessment and Plan:    1.  Stage IB, G2, ER & WI (+), HER2(-), invasive ductal carcinoma UIQ (R) breast ... 3.5+ years s/p bilateral mastectomies, followed by adjuvant chemotherapy and EBRT, on adjuvant aromatase inhibitor (AI) therapy.  58 year old   Ms Mathias presents alone in good spirits.  Overall she is doing really well.  No known side effects from AI therapy.  She had baseline mild hot flashes and arthralgias prior to starting AI therapy and they have not changed.  Appetite good - weight intentionally down 5+ pounds the past year.  Trace foot neuropathies post-chemotherapy continue to decrease, manifest as tingliness and pain with excessive walking.  The tingliness in her finger tips has mostly resolved, also had carpal tunnel - impoved with surgery.  She has a history of vertigo treated with Meclazine - no episodes recently.  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  She denies cough, fevers or night sweats, new or concerning lumps or bumps, unusual headaches, visual or mentation disturbance, mouth sores, nausea or vomiting, abdominal or chest pain, bladder issues, blood in stool or black stools, skin rash.  She lives in [place], but comes to [place] quarterly for her employment which is when she schedules her f/up with us.   Clinically BOBBY 3.5+ years s/p bilateral mastectomies, followed by adjuvant chemotherapy and EBRT, on adjuvant aromatase inhibitor (AI) therapy:  Continue adjuvant AI  therapy, anticipating 5-years adjuvant endocrine therapy (December, 2025).  Continue self chest wall examinations.  Osteopenia - high risk for worsening bone density on aromatase inhibitor therapy.    Continue 9196-1670 mg calcium and 9768-8240 international unit(s) vitamin D daily supplement.  Good calcium in diet.   She states she had a bone density scan done in [place]- will obtain documents.    Reminded need for every 2 year DEXA scans while on anastrozole therapy.  Encouraged to participate in weightbearing exercises.   Encouraged continued weight management through diet and exercise.  6-month f/up.  No labs or imaging.    Oncologic History:    1.      Malignant neoplasm UIQ (R) female breast          Pathologic Stage IB (pT3, pN1a(sn), cM0)        G2     ER+ @ 95%     MD+ @ 75%       HER2-        Oncotype DX score @ 24  2019, July - noted an inverted nipple on the right side.  2019, November - noted a lump in the right breast.    02/06/2020 diagnostic (B) mammogram and US showed a 5.4 cm mass within the right breast at the 12 o'clock position.    Core needle biopsy - c/w invasive ductal carcinoma, grade 2, strongly estrogen and progesterone receptor positive and HER-2 negative.    03/18/2020 - bilateral mastectomies and right-sided sentinel lymph node dissection with tissue expanders.    Pathology showed a 7.5 cm, grade 2, invasive ductal carcinoma, which also had some lymphovascular invasion.  1 of the 3 lymph nodes was positive for a 1.1 cm metastatic focus.  20% of the tumor was a solid type DCIS.    The posterior margin of the sample on the right side came back positive.  03/30/2020 - Reresection and [id number] for chemotherapy.  Consult with Dr Barrera - recommended adjuvant chemotherapy with dose dense Adriamycin + Cytoxan for 4 cycles followed by Taxol given once weekly for 12 weeks  followed by a radiation oncology consultation and adjuvant estrogen blockade therapy.  05/08 - 09/18/2020 -  completed 4 cycles Adriamycin + Cytoxan followed by 12 weekly doses Taxol adjuvant chemotherapy.  12/03/2020 - completed 6040 cGy / 33 fx adjuvant EBRT.  12/04/2020 - started adjuvant aromatase inhibitor (AI) therapy with anastrozole, 1 mg/day.     ECOG Performance:    0 - Independent    Pain:    Pain Score: No Pain (0)    Encounter Diagnoses:    Problem List Items Addressed This Visit          Nervous and Auditory    Chemotherapy-induced peripheral neuropathy        Other    Malignant neoplasm of upper-inner quadrant of right breast in female, estrogen receptor positive (H) - Primary    Relevant Medications    anastrozole (ARIMIDEX) 1 MG tablet     Other Visit Diagnoses       Aromatase inhibitor use                   32 minutes of time were spent on the date of this encounter with chart review, face to face time with the patient, examination, recommendations, documentation, and communication of the plan of care to the care team.    Neptali Reynaga, CNP     CC: Ella Moreno MD   ______________________________________________________________________    Review of Systems.    No fever or night sweats.  No concerning loss of weight.  No lumps or bumps anywhere.  No unusual headaches or eyesight issues.  No dizziness.  No bleeding from the nose.  No sores in the mouth. No problems with swallowing.  No chest pain. No shortness of breath. No cough.  No abdominal pain. No nausea or vomiting.  No diarrhea or constipation.  No blood in stool or black colored stools.  No problems passing urine.  Trace foot neuropathies post-chemotherapy continue to decrease, manifest as tingliness and pain with excessive walking.  The tingliness in her finger tips has mostly resolved, also had carpal tunnel - impoved with surgery.  No skin rashes.    A 14 point review of systems is otherwise negative.    Past History:    Past Medical History:   Diagnosis Date     Anxiety      Asthma      Breast cancer (H)      Carpal tunnel syndrome       Depression      Exercise induced bronchospasm      Insomnia      Migraine      Osteoarthritis of knees, bilateral      Recurrent cold sores      Temporomandibular joint pain 03/01/1993    Uses a mouthguard.       Past Surgical History:   Procedure Laterality Date     ABDOMINOPLASTY  06/01/2019     ARTHROPLASTY KNEE UNICOMPARTMENT Right 08/14/2019     AS REMOVAL OF BREAST IMPLANT Right 07/06/2021     AS REMOVAL OF BREAST IMPLANT Left 09/02/2021     COLONOSCOPY  11/01/2017    Repeat in 10 years.     HC BREAST RECONSTRUC W TISS EXPANDR Bilateral 03/18/2020    Procedure: BILATERAL BREAST RECONSTRUCTION WITH TISSUE EXPANDERS;  Surgeon: Sunni Hunter MD;  Location: [facility];  Service: Plastics     NM SENTINEL NODE INJECTION  03/18/2020     PARTIAL KNEE ARTHROPLASTY Left 07/16/2018     NH INSERT TISSUE EXPANDER(S) Right 03/30/2020    Procedure: REVISION RIGHT BREAST RECONSTRUCTION WITH PLACEMENT SUBMUSCULAR  TISSUE EXPANDER;  Surgeon: Sunni Hunter MD;  Location: [facility] OR;  Service: Plastics     NH MASTECTOMY, SIMPLE, COMPLETE Bilateral 03/18/2020    Procedure: Bilateral mastectomies;  Surgeon: Kayla Beaver MD;  Location: [facility] OR;  Service: General     NH MASTECTOMY, SIMPLE, COMPLETE Right 03/30/2020    Procedure: Revision Right Mastectomy;  Surgeon: Kayla Beaver MD;  Location: [facility] OR;  Service: General     NH RMVL LIZABETH CTR VAD W/SUBQ PORT/ CTR/PRPH INSJ N/A 10/09/2020    Procedure: Port Removal;  Surgeon: Kayla Beaver MD;  Location: [facility] OR;  Service: General     RELEASE CARPAL TUNNEL Right 07/14/2008     TONSILLECTOMY & ADENOIDECTOMY  05/01/1994     TUBAL LIGATION  01/01/1993     TUNNELED VENOUS CATHETER PLACEMENT Left 03/30/2020    Procedure: Port Placement on the Left;  Surgeon: Kayla Beaver MD;  Location: [facility];  Service: General     US BREAST CORE BIOPSY RIGHT Right 02/10/2020     Physical Exam:    /87 (BP  "Location: Left arm, Patient Position: Sitting, Cuff Size: Adult Large)   Pulse 89   Ht 1.575 m (5' 2.01\")   Wt 87.8 kg (193 lb 9.6 oz)   SpO2 98%   BMI 35.40 kg/m      GENERAL:        Very pleasant.  Alert and oriented.  Comfortable.  In no acute distress.       HEENT:                 Anicteric sclera.  EMMA.  EOMI.  No pallor.  No mucosal lesions or tonsillar enlargement.     LYMPH NODES:      No palpable cervical, axillary or inguinal lymphadenopathy.     CHEST:                Lungs clear.     BREASTS:             C/W bilateral mastectomies.  No concerning lumps or bumps or skin changes. (B) negative axillas.                              Patient declined female  during exam.     CVS:                       S1 and S2 aheard.  Regular rate and rhythm.  No murmur, gallop or rub heard.  No peripheral edema.     ABDOMEN:            Soft.  Not tender or distended.  No palpable organomegaly, masses or ascites.     EXTREMITIES:       Warm.  No edema     SKIN:                   No rash, bruising or purpura noted. Full head of hair.    Lab Results    No results found for this or any previous visit (from the past 24 hour(s)).    Imaging:    No results found.      Oncology Rooming Note    October 9, 2023 2:44 PM   Janeth Mathias is a 58 year old female who presents for:    Chief Complaint   Patient presents with     Oncology Clinic Visit     Malignant neoplasm of upper-inner quadrant of right breast in female, estrogen receptor positive     Initial Vitals: Ht 1.575 m (5' 2.01\")   BMI 36.48 kg/m   Estimated body mass index is 36.48 kg/m  as calculated from the following:    Height as of this encounter: 1.575 m (5' 2.01\").    Weight as of 12/14/22: 90.5 kg (199 lb 8 oz). Body surface area is 1.99 meters squared.  No Pain (0) Comment: Data Unavailable   No LMP recorded. Patient is perimenopausal.  Allergies reviewed: Yes  Medications reviewed: Yes    Medications: MEDICATION REFILLS NEEDED TODAY. Provider was " notified.  Pharmacy name entered into [facility]:    [id number] - [place], [place] - [address] AT [facility] & [address] [place] [address]  [id number] - [place] - [address] AT [facility] OF [address] [place] [address]    Clinical concerns: 6 month follow up      Adamaris Cunningham MA                Again, thank you for allowing me to participate in the care of your patient.        Sincerely,        Neptali Reynaga, CNP

## 2023-10-09 NOTE — PROGRESS NOTES
"Oncology Rooming Note    October 9, 2023 2:44 PM   Janeth Mathias is a 58 year old female who presents for:    Chief Complaint   Patient presents with    Oncology Clinic Visit     Malignant neoplasm of upper-inner quadrant of right breast in female, estrogen receptor positive     Initial Vitals: Ht 1.575 m (5' 2.01\")   BMI 36.48 kg/m   Estimated body mass index is 36.48 kg/m  as calculated from the following:    Height as of this encounter: 1.575 m (5' 2.01\").    Weight as of 12/14/22: 90.5 kg (199 lb 8 oz). Body surface area is 1.99 meters squared.  No Pain (0) Comment: Data Unavailable   No LMP recorded. Patient is perimenopausal.  Allergies reviewed: Yes  Medications reviewed: Yes    Medications: MEDICATION REFILLS NEEDED TODAY. Provider was notified.  Pharmacy name entered into [facility]:    [id number] - [place] - [address]  AT [facility] & [address] [place] [address]  [id number] - [place] - [address] AT [address]    Clinical concerns: 6 month follow up      Adamaris Cunningham MA              "

## 2024-04-17 ENCOUNTER — ONCOLOGY VISIT (OUTPATIENT)
Dept: ONCOLOGY | Facility: HOSPITAL | Age: 59
End: 2024-04-17
Attending: NURSE PRACTITIONER
Payer: COMMERCIAL

## 2024-04-17 VITALS
HEIGHT: 62 IN | DIASTOLIC BLOOD PRESSURE: 96 MMHG | WEIGHT: 195 LBS | RESPIRATION RATE: 16 BRPM | SYSTOLIC BLOOD PRESSURE: 146 MMHG | BODY MASS INDEX: 35.88 KG/M2 | OXYGEN SATURATION: 98 % | HEART RATE: 94 BPM

## 2024-04-17 DIAGNOSIS — Z17.0 MALIGNANT NEOPLASM OF UPPER-INNER QUADRANT OF RIGHT BREAST IN FEMALE, ESTROGEN RECEPTOR POSITIVE (H): Primary | ICD-10-CM

## 2024-04-17 DIAGNOSIS — C50.211 MALIGNANT NEOPLASM OF UPPER-INNER QUADRANT OF RIGHT BREAST IN FEMALE, ESTROGEN RECEPTOR POSITIVE (H): Primary | ICD-10-CM

## 2024-04-17 PROCEDURE — 99214 OFFICE O/P EST MOD 30 MIN: CPT | Performed by: INTERNAL MEDICINE

## 2024-04-17 ASSESSMENT — ENCOUNTER SYMPTOMS
CARDIOVASCULAR NEGATIVE: 1
RESPIRATORY NEGATIVE: 1
ARTHRALGIAS: 1
PSYCHIATRIC NEGATIVE: 1
GASTROINTESTINAL NEGATIVE: 1
HEMATOLOGIC/LYMPHATIC NEGATIVE: 1
EYES NEGATIVE: 1
ENDOCRINE NEGATIVE: 1
DIZZINESS: 1
CONSTITUTIONAL NEGATIVE: 1

## 2024-04-17 ASSESSMENT — PAIN SCALES - GENERAL: PAINLEVEL: NO PAIN (0)

## 2024-04-17 NOTE — PROGRESS NOTES
"Oncology Rooming Note    April 17, 2024 1:30 PM   Janeth Mathias is a 58 year old female who presents for:    Chief Complaint   Patient presents with    Oncology Clinic Visit     Malignant neoplasm of upper-inner quadrant of right breast in female, estrogen receptor positive     Initial Vitals: BP (!) 146/96   Pulse 94   Resp 16   Ht 1.575 m (5' 2\")   Wt 88.5 kg (195 lb)   SpO2 98%   BMI 35.67 kg/m   Estimated body mass index is 35.67 kg/m  as calculated from the following:    Height as of this encounter: 1.575 m (5' 2\").    Weight as of this encounter: 88.5 kg (195 lb). Body surface area is 1.97 meters squared.  No Pain (0) Comment: Data Unavailable   No LMP recorded. Patient is perimenopausal.  Allergies reviewed: Yes  Medications reviewed: Yes    Medications: Medication refills not needed today.  Pharmacy name entered into [facility]:    [id number] - [place], [place] - [address]  AT [address]  [id number] - [place] - [address] [place] AT [address]    Frailty Screening:   Is the patient here for a new oncology consult visit in cancer care? 2. No      Clinical concerns:  6 month follow up      Maty Mcfarland              "

## 2024-04-17 NOTE — PROGRESS NOTES
Assessment & Plan   Breast cancer    Continue anastrozole  Bone density exam  Follow up with provider in 6 months    Interval History  This is a scheduled follow up for this  originally seen by Georgia Barrera MD for breast cancer diagnosed in 2019 after presenting with right nipple inversion.      The patient has done quite well, works full time as a , tolerates anastrozole without any problems.    She doesn't appear to have had a bone density test, and she's agreeable to have that before she's seen again.    ECOG = 0    Oncologic History  Stage IB, G2, ER & [place] (+), HER2(-), invasive ductal carcinoma UIQ (R) breast ... 3.5+ years s/p bilateral mastectomies, followed by adjuvant chemotherapy and EBRT, on adjuvant aromatase inhibitor (AI) therapy.  58 year old   Ms Mathias presents alone in good spirits.  Overall she is doing really well.  No known side effects from AI therapy.  She had baseline mild hot flashes and arthralgias prior to starting AI therapy and they have not changed.  Appetite good - weight intentionally down 5+ pounds the past year.  Trace foot neuropathies post-chemotherapy continue to decrease, manifest as tingliness and pain with excessive walking.  The tingliness in her finger tips has mostly resolved, also had carpal tunnel - impoved with surgery.  She has a history of vertigo treated with Meclazine - no episodes recently.  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  She denies cough, fevers or night sweats, new or concerning lumps or bumps, unusual headaches, visual or mentation disturbance, mouth sores, nausea or vomiting, abdominal or chest pain, bladder issues, blood in stool or black stools, skin rash.  She lives in [place], but comes to [place] quarterly for her employment which is when she schedules her f/up with us.   Clinically BOBBY 3.5+ years s/p bilateral mastectomies, followed by  adjuvant chemotherapy and EBRT, on adjuvant aromatase inhibitor (AI) therapy:  Continue adjuvant AI therapy, anticipating 5-years adjuvant endocrine therapy (December, 2025).  Continue self chest wall examinations.  Osteopenia - high risk for worsening bone density on aromatase inhibitor therapy.    Continue 3361-9550 mg calcium and 4051-4300 international unit(s) vitamin D daily supplement.  Good calcium in diet.   She states she had a bone density scan done in [place]- will obtain documents.    Reminded need for every 2 year DEXA scans while on anastrozole therapy.  Encouraged to participate in weightbearing exercises.   Encouraged continued weight management through diet and exercise.  6-month f/up.  No labs or imaging.     Oncologic History:  Malignant neoplasm UIQ (R) female breast                       Pathologic Stage IB (pT3, pN1a(sn), cM0)                 G2              ER+ @ 95%              NV+ @ 75%                HER2-                 Oncotype DX score @ 24  2019, July - noted an inverted nipple on the right side.  2019, November - noted a lump in the right breast.    02/06/2020 diagnostic (B) mammogram and US showed a 5.4 cm mass within the right breast at the 12 o'clock position.    Core needle biopsy - c/w invasive ductal carcinoma, grade 2, strongly estrogen and progesterone receptor positive and HER-2 negative.    03/18/2020 - bilateral mastectomies and right-sided sentinel lymph node dissection with tissue expanders.    Pathology showed a 7.5 cm, grade 2, invasive ductal carcinoma, which also had some lymphovascular invasion.  1 of the 3 lymph nodes was positive for a 1.1 cm metastatic focus.  20% of the tumor was a solid type DCIS.    The posterior margin of the sample on the right side came back positive.  03/30/2020 - Reresection and [id number] for chemotherapy.  Consult with Dr Barrera - recommended adjuvant chemotherapy with dose dense Adriamycin + Cytoxan for 4 cycles followed by  Taxol given once weekly for 12 weeks  followed by a radiation oncology consultation and adjuvant estrogen blockade therapy.  05/08 - 09/18/2020 - completed 4 cycles Adriamycin + Cytoxan followed by 12 weekly doses Taxol adjuvant chemotherapy.  12/03/2020 - completed 6040 cGy / 33 fx adjuvant EBRT.  12/04/2020 - started adjuvant aromatase inhibitor (AI) therapy with anastrozole, 1 mg/day.    Patient Active Problem List   Diagnosis    Malignant neoplasm of upper-inner quadrant of right breast in female, estrogen receptor positive (H)    Chemotherapy-induced peripheral neuropathy (H24)    Benign paroxysmal positional vertigo, unspecified laterality     Current Outpatient Medications   Medication Sig Dispense Refill    acetaminophen (TYLENOL) 500 MG tablet Take 500 mg by mouth      ALPRAZolam (XANAX) 0.25 MG tablet TAKE 1 TABLET BY MOUTH TWICE DAILY AS NEEDED      anastrozole (ARIMIDEX) 1 MG tablet Take 1 tablet (1 mg) by mouth daily 90 tablet 3    budesonide-formoterol (SYMBICORT) 80-4.5 MCG/ACT Inhaler INHALE 2 PUFFS BY MOUTH TWICE DAILY      calcium carbonate-vitamin D (OS-JENNIFER WITH D) 500-200 MG-UNIT tablet Take 1 tablet by mouth      clindamycin (CLEOCIN) 150 MG capsule TK FOUR CS PO 1 HOUR B DAPP      docusate sodium (COLACE) 100 MG capsule 1 capsule as needed      escitalopram (LEXAPRO) 20 MG tablet Take 20 mg by mouth daily      levothyroxine (SYNTHROID/LEVOTHROID) 75 MCG tablet TAKE 1 TABLET BY MOUTH EVERY DAY IN THE MORNING ON AN EMPTY STOMACH      rizatriptan (MAXALT) 10 MG tablet TAKE 1 TABLET BY MOUTH EVERY DAY AS NEEDED      rosuvastatin (CRESTOR) 10 MG tablet Take 20 mg by mouth daily      traZODone (DESYREL) 50 MG tablet TAKE 1 TABLET BY MOUTH EVERY DAY AT BEDTIME AS NEEDED      valACYclovir (VALTREX) 1000 mg tablet Take 1 tablet by mouth      zolpidem (AMBIEN) 10 MG tablet Take 10 mg by mouth At Bedtime       No current facility-administered medications for this visit.     Past Medical History:    Diagnosis Date    Anxiety     Asthma     Breast cancer (H)     Carpal tunnel syndrome     Depression     Exercise induced bronchospasm     Insomnia     Migraine     Osteoarthritis of knees, bilateral     Recurrent cold sores     Temporomandibular joint pain 03/01/1993    Uses a mouthguard.     Past Surgical History:   Procedure Laterality Date    ABDOMINOPLASTY  06/01/2019    ARTHROPLASTY KNEE UNICOMPARTMENT Right 08/14/2019    AS REMOVAL OF BREAST IMPLANT Right 07/06/2021    AS REMOVAL OF BREAST IMPLANT Left 09/02/2021    COLONOSCOPY  11/01/2017    Repeat in 10 years.    HC BREAST RECONSTRUC W TISS EXPANDR Bilateral 03/18/2020    Procedure: BILATERAL BREAST RECONSTRUCTION WITH TISSUE EXPANDERS;  Surgeon: Sunni Hunter MD;  Location: [facility];  Service: Plastics    NM SENTINEL NODE INJECTION  03/18/2020    PARTIAL KNEE ARTHROPLASTY Left 07/16/2018    MI INSERT TISSUE EXPANDER(S) Right 03/30/2020    Procedure: REVISION RIGHT BREAST RECONSTRUCTION WITH PLACEMENT SUBMUSCULAR  TISSUE EXPANDER;  Surgeon: Sunni Hunter MD;  Location: [facility] OR;  Service: Plastics    MI MASTECTOMY, SIMPLE, COMPLETE Bilateral 03/18/2020    Procedure: Bilateral mastectomies;  Surgeon: Kayla Beaver MD;  Location: [facility] OR;  Service: General    MI MASTECTOMY, SIMPLE, COMPLETE Right 03/30/2020    Procedure: Revision Right Mastectomy;  Surgeon: Kayla Beaver MD;  Location: [facility] OR;  Service: General    MI RMVL LIZABETH CTR VAD W/SUBQ PORT/ CTR/PRPH INSJ N/A 10/09/2020    Procedure: Port Removal;  Surgeon: Kayla Beaver MD;  Location: [facility] OR;  Service: General    RELEASE CARPAL TUNNEL Right 07/14/2008    TONSILLECTOMY & ADENOIDECTOMY  05/01/1994    TUBAL LIGATION  01/01/1993    TUNNELED VENOUS CATHETER PLACEMENT Left 03/30/2020    Procedure: Port Placement on the Left;  Surgeon: Kayla Beaver MD;  Location: [facility];  Service: General    US BREAST CORE BIOPSY  "RIGHT Right 02/10/2020     Socioeconomic History    Marital status:      Review of Systems   Constitutional: Negative.    HENT:  Negative.     Eyes: Negative.    Respiratory: Negative.     Cardiovascular: Negative.    Gastrointestinal: Negative.    Endocrine: Negative.    Genitourinary: Negative.     Musculoskeletal:  Positive for arthralgias.   Neurological:  Positive for dizziness (vertigo on and off for a long time).   Hematological: Negative.    Psychiatric/Behavioral: Negative.     All other systems reviewed and are negative.      BP (!) 146/96   Pulse 94   Resp 16   Ht 1.575 m (5' 2\")   Wt 88.5 kg (195 lb)   SpO2 98%   BMI 35.67 kg/m      Physical Exam  Vitals reviewed.   Constitutional:       Appearance: Normal appearance. She is well-developed, well-groomed and overweight.      Comments: Friendly and relaxed   HENT:      Head: Normocephalic and atraumatic.      Mouth/Throat:      Mouth: Mucous membranes are moist.      Dentition: Normal dentition. No dental caries.   Eyes:      Extraocular Movements: Extraocular movements intact.      Conjunctiva/sclera: Conjunctivae normal.      Pupils: Pupils are equal, round, and reactive to light.   Cardiovascular:      Rate and Rhythm: Normal rate and regular rhythm.      Heart sounds: Normal heart sounds.   Pulmonary:      Effort: Pulmonary effort is normal.      Breath sounds: Normal breath sounds.   Chest:   Breasts:     Right: Absent.      Left: Absent.          Comments: Bilateral surgical scars  Abdominal:      General: There is no distension.      Palpations: Abdomen is soft. There is no mass.      Tenderness: There is no abdominal tenderness. There is no guarding.   Musculoskeletal:         General: No swelling or deformity.      Cervical back: Normal range of motion and neck supple.   Lymphadenopathy:      Cervical: No cervical adenopathy.      Upper Body:      Right upper body: No axillary or epitrochlear adenopathy.      Left upper body: No " axillary or epitrochlear adenopathy.   Skin:     General: Skin is warm and dry.   Neurological:      General: No focal deficit present.      Mental Status: She is alert and oriented to person, place, and time.      Cranial Nerves: No cranial nerve deficit.      Motor: No weakness.      Gait: Gait normal.      Deep Tendon Reflexes: Reflexes normal.   Psychiatric:         Mood and Affect: Mood normal.         Behavior: Behavior normal. Behavior is cooperative.         Thought Content: Thought content normal.         Judgment: Judgment normal.           No results found for this or any previous visit (from the past 744 hour(s)).    No results found for this or any previous visit (from the past 720 hour(s)).

## 2024-04-17 NOTE — LETTER
4/17/2024         RE: Janeth Mathias  [address] [place] [address]  [place] [address]        Dear Colleague,    Thank you for referring your patient, Janeth Mathias, to the [facility]. Please see a copy of my visit note below.    Assessment & Plan   Breast cancer    Continue anastrozole  Bone density exam  Follow up with provider in 6 months    Interval History  This is a scheduled follow up for this  originally seen by Georgia Barrera MD for breast cancer diagnosed in 2019 after presenting with right nipple inversion.      The patient has done quite well, works full time as a , tolerates anastrozole without any problems.    She doesn't appear to have had a bone density test, and she's agreeable to have that before she's seen again.    ECOG = 0    Oncologic History  Stage IB, G2, ER & OK (+), HER2(-), invasive ductal carcinoma UIQ (R) breast ... 3.5+ years s/p bilateral mastectomies, followed by adjuvant chemotherapy and EBRT, on adjuvant aromatase inhibitor (AI) therapy.  58 year old   Ms Mathias presents alone in good spirits.  Overall she is doing really well.  No known side effects from AI therapy.  She had baseline mild hot flashes and arthralgias prior to starting AI therapy and they have not changed.  Appetite good - weight intentionally down 5+ pounds the past year.  Trace foot neuropathies post-chemotherapy continue to decrease, manifest as tingliness and pain with excessive walking.  The tingliness in her finger tips has mostly resolved, also had carpal tunnel - impoved with surgery.  She has a history of vertigo treated with Meclazine - no episodes recently.  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  She denies cough, fevers or night sweats, new or concerning lumps or bumps, unusual headaches, visual or mentation disturbance, mouth sores, nausea or vomiting, abdominal or  chest pain, bladder issues, blood in stool or black stools, skin rash.  She lives in [place], but comes to [place] quarterly for her employment which is when she schedules her f/up with us.   Clinically BOBBY 3.5+ years s/p bilateral mastectomies, followed by adjuvant chemotherapy and EBRT, on adjuvant aromatase inhibitor (AI) therapy:  Continue adjuvant AI therapy, anticipating 5-years adjuvant endocrine therapy (December, 2025).  Continue self chest wall examinations.  Osteopenia - high risk for worsening bone density on aromatase inhibitor therapy.    Continue 4883-0325 mg calcium and 0727-0549 international unit(s) vitamin D daily supplement.  Good calcium in diet.   She states she had a bone density scan done in [place]- will obtain documents.    Reminded need for every 2 year DEXA scans while on anastrozole therapy.  Encouraged to participate in weightbearing exercises.   Encouraged continued weight management through diet and exercise.  6-month f/up.  No labs or imaging.     Oncologic History:  Malignant neoplasm UIQ (R) female breast                       Pathologic Stage IB (pT3, pN1a(sn), cM0)                 G2              ER+ @ 95%              IN+ @ 75%                HER2-                 Oncotype DX score @ 24  2019, July - noted an inverted nipple on the right side.  2019, November - noted a lump in the right breast.    02/06/2020 diagnostic (B) mammogram and US showed a 5.4 cm mass within the right breast at the 12 o'clock position.    Core needle biopsy - c/w invasive ductal carcinoma, grade 2, strongly estrogen and progesterone receptor positive and HER-2 negative.    03/18/2020 - bilateral mastectomies and right-sided sentinel lymph node dissection with tissue expanders.    Pathology showed a 7.5 cm, grade 2, invasive ductal carcinoma, which also had some lymphovascular invasion.  1 of the 3 lymph nodes was positive for a 1.1 cm metastatic focus.  20% of the tumor was a solid type DCIS.    The  posterior margin of the sample on the right side came back positive.  03/30/2020 - Reresection and [id number]-[id number] for chemotherapy.  Consult with Dr Barrera - recommended adjuvant chemotherapy with dose dense Adriamycin + Cytoxan for 4 cycles followed by Taxol given once weekly for 12 weeks  followed by a radiation oncology consultation and adjuvant estrogen blockade therapy.  05/08 - 09/18/2020 - completed 4 cycles Adriamycin + Cytoxan followed by 12 weekly doses Taxol adjuvant chemotherapy.  12/03/2020 - completed 6040 cGy / 33 fx adjuvant EBRT.  12/04/2020 - started adjuvant aromatase inhibitor (AI) therapy with anastrozole, 1 mg/day.    Patient Active Problem List   Diagnosis     Malignant neoplasm of upper-inner quadrant of right breast in female, estrogen receptor positive (H)     Chemotherapy-induced peripheral neuropathy (H24)     Benign paroxysmal positional vertigo, unspecified laterality     Current Outpatient Medications   Medication Sig Dispense Refill     acetaminophen (TYLENOL) 500 MG tablet Take 500 mg by mouth       ALPRAZolam (XANAX) 0.25 MG tablet TAKE 1 TABLET BY MOUTH TWICE DAILY AS NEEDED       anastrozole (ARIMIDEX) 1 MG tablet Take 1 tablet (1 mg) by mouth daily 90 tablet 3     budesonide-formoterol (SYMBICORT) 80-4.5 MCG/ACT Inhaler INHALE 2 PUFFS BY MOUTH TWICE DAILY       calcium carbonate-vitamin D (OS-JENNIFER WITH D) 500-200 MG-UNIT tablet Take 1 tablet by mouth       clindamycin (CLEOCIN) 150 MG capsule TK FOUR CS PO 1 HOUR B DAPP       docusate sodium (COLACE) 100 MG capsule 1 capsule as needed       escitalopram (LEXAPRO) 20 MG tablet Take 20 mg by mouth daily       levothyroxine (SYNTHROID/LEVOTHROID) 75 MCG tablet TAKE 1 TABLET BY MOUTH EVERY DAY IN THE MORNING ON AN EMPTY STOMACH       rizatriptan (MAXALT) 10 MG tablet TAKE 1 TABLET BY MOUTH EVERY DAY AS NEEDED       rosuvastatin (CRESTOR) 10 MG tablet Take 20 mg by mouth daily       traZODone (DESYREL) 50 MG tablet TAKE 1 TABLET BY  MOUTH EVERY DAY AT BEDTIME AS NEEDED       valACYclovir (VALTREX) 1000 mg tablet Take 1 tablet by mouth       zolpidem (AMBIEN) 10 MG tablet Take 10 mg by mouth At Bedtime       No current facility-administered medications for this visit.     Past Medical History:   Diagnosis Date     Anxiety      Asthma      Breast cancer (H)      Carpal tunnel syndrome      Depression      Exercise induced bronchospasm      Insomnia      Migraine      Osteoarthritis of knees, bilateral      Recurrent cold sores      Temporomandibular joint pain 03/01/1993    Uses a mouthguard.     Past Surgical History:   Procedure Laterality Date     ABDOMINOPLASTY  06/01/2019     ARTHROPLASTY KNEE UNICOMPARTMENT Right 08/14/2019     AS REMOVAL OF BREAST IMPLANT Right 07/06/2021     AS REMOVAL OF BREAST IMPLANT Left 09/02/2021     COLONOSCOPY  11/01/2017    Repeat in 10 years.     HC BREAST RECONSTRUC W TISS EXPANDR Bilateral 03/18/2020    Procedure: BILATERAL BREAST RECONSTRUCTION WITH TISSUE EXPANDERS;  Surgeon: Sunni Hunter MD;  Location: [facility];  Service: Plastics     NM SENTINEL NODE INJECTION  03/18/2020     PARTIAL KNEE ARTHROPLASTY Left 07/16/2018     IN INSERT TISSUE EXPANDER(S) Right 03/30/2020    Procedure: REVISION RIGHT BREAST RECONSTRUCTION WITH PLACEMENT SUBMUSCULAR  TISSUE EXPANDER;  Surgeon: Sunni Hunter MD;  Location: [facility] OR;  Service: Plastics     IN MASTECTOMY, SIMPLE, COMPLETE Bilateral 03/18/2020    Procedure: Bilateral mastectomies;  Surgeon: Kayla Beaver MD;  Location: [facility] OR;  Service: General     IN MASTECTOMY, SIMPLE, COMPLETE Right 03/30/2020    Procedure: Revision Right Mastectomy;  Surgeon: Kayla Beaver MD;  Location: [facility] OR;  Service: General     IN RMVL LIZABETH CTR VAD W/SUBQ PORT/ CTR/PRPH INSJ N/A 10/09/2020    Procedure: Port Removal;  Surgeon: Kayla Beaver MD;  Location: [facility] OR;  Service: General     RELEASE CARPAL TUNNEL  "Right 07/14/2008     TONSILLECTOMY & ADENOIDECTOMY  05/01/1994     TUBAL LIGATION  01/01/1993     TUNNELED VENOUS CATHETER PLACEMENT Left 03/30/2020    Procedure: Port Placement on the Left;  Surgeon: Kayla Beaver MD;  Location: [facility];  Service: General     US BREAST CORE BIOPSY RIGHT Right 02/10/2020     Socioeconomic History     Marital status:      Review of Systems   Constitutional: Negative.    HENT:  Negative.     Eyes: Negative.    Respiratory: Negative.     Cardiovascular: Negative.    Gastrointestinal: Negative.    Endocrine: Negative.    Genitourinary: Negative.     Musculoskeletal:  Positive for arthralgias.   Neurological:  Positive for dizziness (vertigo on and off for a long time).   Hematological: Negative.    Psychiatric/Behavioral: Negative.     All other systems reviewed and are negative.      BP (!) 146/96   Pulse 94   Resp 16   Ht 1.575 m (5' 2\")   Wt 88.5 kg (195 lb)   SpO2 98%   BMI 35.67 kg/m      Physical Exam  Vitals reviewed.   Constitutional:       Appearance: Normal appearance. She is well-developed, well-groomed and overweight.      Comments: Friendly and relaxed   HENT:      Head: Normocephalic and atraumatic.      Mouth/Throat:      Mouth: Mucous membranes are moist.      Dentition: Normal dentition. No dental caries.   Eyes:      Extraocular Movements: Extraocular movements intact.      Conjunctiva/sclera: Conjunctivae normal.      Pupils: Pupils are equal, round, and reactive to light.   Cardiovascular:      Rate and Rhythm: Normal rate and regular rhythm.      Heart sounds: Normal heart sounds.   Pulmonary:      Effort: Pulmonary effort is normal.      Breath sounds: Normal breath sounds.   Chest:   Breasts:     Right: Absent.      Left: Absent.          Comments: Bilateral surgical scars  Abdominal:      General: There is no distension.      Palpations: Abdomen is soft. There is no mass.      Tenderness: There is no abdominal tenderness. There is no " "guarding.   Musculoskeletal:         General: No swelling or deformity.      Cervical back: Normal range of motion and neck supple.   Lymphadenopathy:      Cervical: No cervical adenopathy.      Upper Body:      Right upper body: No axillary or epitrochlear adenopathy.      Left upper body: No axillary or epitrochlear adenopathy.   Skin:     General: Skin is warm and dry.   Neurological:      General: No focal deficit present.      Mental Status: She is alert and oriented to person, place, and time.      Cranial Nerves: No cranial nerve deficit.      Motor: No weakness.      Gait: Gait normal.      Deep Tendon Reflexes: Reflexes normal.   Psychiatric:         Mood and Affect: Mood normal.         Behavior: Behavior normal. Behavior is cooperative.         Thought Content: Thought content normal.         Judgment: Judgment normal.           No results found for this or any previous visit (from the past 744 hour(s)).    No results found for this or any previous visit (from the past 720 hour(s)).      Oncology Rooming Note    April 17, 2024 1:30 PM   Janeth Mathias is a 58 year old female who presents for:    Chief Complaint   Patient presents with     Oncology Clinic Visit     Malignant neoplasm of upper-inner quadrant of right breast in female, estrogen receptor positive     Initial Vitals: BP (!) 146/96   Pulse 94   Resp 16   Ht 1.575 m (5' 2\")   Wt 88.5 kg (195 lb)   SpO2 98%   BMI 35.67 kg/m   Estimated body mass index is 35.67 kg/m  as calculated from the following:    Height as of this encounter: 1.575 m (5' 2\").    Weight as of this encounter: 88.5 kg (195 lb). Body surface area is 1.97 meters squared.  No Pain (0) Comment: Data Unavailable   No LMP recorded. Patient is perimenopausal.  Allergies reviewed: Yes  Medications reviewed: Yes    Medications: Medication refills not needed today.  Pharmacy name entered into [employer]:    [id number] - [place], [place] - [address] AT [facility] [address]  [id number] - [place], [place] - [address] AT CORNER OF [address] [place] [address]    Frailty Screening:   Is the patient here for a new oncology consult visit in cancer care? 2. No      Clinical concerns:  6 month follow up      Maty Mcfarland                Again, thank you for allowing me to participate in the care of your patient.        Sincerely,        Melba Mccann MD

## 2024-06-24 ENCOUNTER — ANCILLARY PROCEDURE (OUTPATIENT)
Dept: BONE DENSITY | Facility: CLINIC | Age: 59
End: 2024-06-24
Attending: INTERNAL MEDICINE
Payer: COMMERCIAL

## 2024-06-24 DIAGNOSIS — Z17.0 MALIGNANT NEOPLASM OF UPPER-INNER QUADRANT OF RIGHT BREAST IN FEMALE, ESTROGEN RECEPTOR POSITIVE (H): ICD-10-CM

## 2024-06-24 DIAGNOSIS — C50.211 MALIGNANT NEOPLASM OF UPPER-INNER QUADRANT OF RIGHT BREAST IN FEMALE, ESTROGEN RECEPTOR POSITIVE (H): ICD-10-CM

## 2024-06-24 PROCEDURE — 77089 TBS DXA CAL W/I&R FX RISK: CPT

## 2024-06-24 PROCEDURE — 77080 DXA BONE DENSITY AXIAL: CPT | Mod: TC

## 2024-09-28 ENCOUNTER — HEALTH MAINTENANCE LETTER (OUTPATIENT)
Age: 59
End: 2024-09-28

## 2024-10-09 DIAGNOSIS — C50.211 MALIGNANT NEOPLASM OF UPPER-INNER QUADRANT OF RIGHT BREAST IN FEMALE, ESTROGEN RECEPTOR POSITIVE (H): ICD-10-CM

## 2024-10-09 DIAGNOSIS — Z17.0 MALIGNANT NEOPLASM OF UPPER-INNER QUADRANT OF RIGHT BREAST IN FEMALE, ESTROGEN RECEPTOR POSITIVE (H): ICD-10-CM

## 2024-10-09 RX ORDER — ANASTROZOLE 1 MG/1
1 TABLET ORAL DAILY
Qty: 90 TABLET | Refills: 3 | Status: SHIPPED | OUTPATIENT
Start: 2024-10-09

## 2024-10-09 NOTE — TELEPHONE ENCOUNTER
[facility]: Cancer Care                                                                                        Refill request was received via Fax  from [facility] for Anastrozole,  last filled by Neptali Reynaga NP on 10/9/2023 .   Patient was  last seen in clinic on 4/17/24 with Dr. Mccann.  Per her note, Continue adjuvant AI therapy, anticipating 5-years adjuvant endocrine therapy (December, 2025).    Future clinic apt scheduled on 10/30/24 with Jennifer Schneider CNP.  Refill request routed to provider for consideration of filling       Signature:  Christina Shirley RN

## 2024-10-31 NOTE — PROGRESS NOTES
[facility] Hematology and Oncology Outpatient Progress Note    Patient: Janeth Mathias  MRN: [id number]  Date of Service: Nov 1, 2024          Reason for Visit    Chief Complaint   Patient presents with    Oncology Clinic Visit     Malignant neoplasm of upper-inner quadrant of right breast in female, estrogen receptor positive (H)        Cancer Staging   Malignant neoplasm of upper-inner quadrant of right breast in female, estrogen receptor positive (H)  Staging form: Breast, AJCC 8th Edition  - Pathologic stage from 3/18/2020: Stage IB (pT3, pN1a, cM0, G2, ER+, [place]+, HER2-, Oncotype DX score: 24) - Signed by Lisa Epps APRN CNP on 6/28/2022      Primary Hematologist/Oncologist: Georgia Barrera MD Wang, Xin, MD         Assessment/Plan  #.  Malignant neoplasm of upper-inner quadrant of right breast in female, estrogen receptor positive  #.  Aromatase inhibitor use  Clinically stable. No evidence for breast cancer recurrence on exam.  She has no new concerns. She endorses daily use of anastrozole, and tolerates this very well. Plan is to continue adjuvant endocrine therapy for at least 5 years.   Continue anastrozole. 5 years will be 12/2025.   Continue clinical surveillance with follow up clinical exam in 6 months.    Pt instructed to call this clinic sooner with any concerns.    #. Bone health  DEXA done 6/24/224 showed normal bone density.   Continue calcium/vitamin D and weightbearing exercises for bone health.    Recommend DEXA scan every 2-3 years, next due around 6/2026.    ______________________________________________________________________________    History of Present Illness/ Interval History    Ms. Janeth Mathias  is a 59 year old patient who is seen today for a 6 month follow up visit. She has been taking anastrozole for breast cancer since 12/2020 and has excellent toleration. She does have joint aches in her hands, but attributes this to Rheumatoid arthritis and plans to get started on  methotrexate next week with her rheumatologist in [place]. She endorses daily use of calcium and vitamin d. Stays active. Denies any new chest concerns.     ECOG performance status   0- Fully active, without restriction        Pain  Pain Score: No Pain (0)    ROS  A 14 point review of systems was obtained.  Positive findings noted in the history.  The remainder of the review of system is otherwise negative.      Oncology History/Treatment  Malignant neoplasm UIQ (R) female breast                       Pathologic Stage IB (pT3, pN1a(sn), cM0)                 G2              ER+ @ 95%              [place]+ @ 75%                HER2-                 Oncotype DX score @ 24  2019, July - noted an inverted nipple on the right side.  2019, November - noted a lump in the right breast.    02/06/2020 diagnostic (B) mammogram and US showed a 5.4 cm mass within the right breast at the 12 o'clock position.    Core needle biopsy - c/w invasive ductal carcinoma, grade 2, strongly estrogen and progesterone receptor positive and HER-2 negative.    03/18/2020 - bilateral mastectomies and right-sided sentinel lymph node dissection with tissue expanders.    Pathology showed a 7.5 cm, grade 2, invasive ductal carcinoma, which also had some lymphovascular invasion.  1 of the 3 lymph nodes was positive for a 1.1 cm metastatic focus.  20% of the tumor was a solid type DCIS.    The posterior margin of the sample on the right side came back positive.  03/30/2020 - Reresection and [id number]-Rlfricerfm for chemotherapy.  Consult with Dr Barrera - recommended adjuvant chemotherapy with dose dense Adriamycin + Cytoxan for 4 cycles followed by Taxol given once weekly for 12 weeks  followed by a radiation oncology consultation and adjuvant estrogen blockade therapy.  05/08 - 09/18/2020 - completed 4 cycles Adriamycin + Cytoxan followed by 12 weekly doses Taxol adjuvant chemotherapy.  12/03/2020 - completed 6040 cGy / 33 fx adjuvant EBRT.  12/04/2020 -  "started adjuvant aromatase inhibitor (AI) therapy with anastrozole, 1 mg/day.      Physical Exam    BP (!) 142/84   Pulse 90   Temp 98.3  F (36.8  C)   Resp 18   Ht 1.575 m (5' 2\")   Wt 86.3 kg (190 lb 3.2 oz)   SpO2 97%   BMI 34.79 kg/m      General: Well-appearing female in no acute distress. Cooperative in conversation.  Eyes: EOMI, PERRL. No scleral icterus.  ENT: Oral mucosa is moist without lesions or thrush. No ulcerations or mucositis noted.  Lymphatic: Neck is supple without cervical, axillary, or supraclavicular lymphadenopathy.   Cardiovascular: RRR No murmurs, gallops, or rubs. No peripheral edema.  Chest: surgically absent breasts. No suspicious masses, skin changes or axillary nodes.   Respiratory: CTA bilaterally. No wheezes or crackles.  Gastrointestinal: BS +. Abdomen soft, non-tender. No palpable hepatosplenomegaly or masses.   Neurologic: Alert and oriented x3. Cranial nerves II through XII are grossly intact.  Skin: No rashes, petechiae, or bruising noted. Warm, dry, intact.      Lab Results    No results found for this or any previous visit (from the past week).  I reviewed the above labs today.  Imaging    No results found.      Billing  Total time 25 minutes, to include face to face visit, review of EMR, ordering, documentation and coordination of care on date of service. The longitudinal plan of care for the diagnosis(es)/condition(s) as documented were addressed during this visit. Due to the added complexity in care, I will continue to support Janeth in the subsequent management and with ongoing continuity of care.    Signed by: RANDEE Melissa CNP        Chart documentation with Dragon Voice recognition Software. Although reviewed after completion, some words and grammatical errors may remain.  "

## 2024-11-01 ENCOUNTER — ONCOLOGY VISIT (OUTPATIENT)
Dept: ONCOLOGY | Facility: HOSPITAL | Age: 59
End: 2024-11-01
Payer: COMMERCIAL

## 2024-11-01 VITALS
TEMPERATURE: 98.3 F | DIASTOLIC BLOOD PRESSURE: 84 MMHG | HEART RATE: 90 BPM | HEIGHT: 62 IN | BODY MASS INDEX: 35 KG/M2 | SYSTOLIC BLOOD PRESSURE: 142 MMHG | OXYGEN SATURATION: 97 % | RESPIRATION RATE: 18 BRPM | WEIGHT: 190.2 LBS

## 2024-11-01 DIAGNOSIS — C50.211 MALIGNANT NEOPLASM OF UPPER-INNER QUADRANT OF RIGHT BREAST IN FEMALE, ESTROGEN RECEPTOR POSITIVE (H): Primary | ICD-10-CM

## 2024-11-01 DIAGNOSIS — Z17.0 MALIGNANT NEOPLASM OF UPPER-INNER QUADRANT OF RIGHT BREAST IN FEMALE, ESTROGEN RECEPTOR POSITIVE (H): Primary | ICD-10-CM

## 2024-11-01 DIAGNOSIS — Z79.811 AROMATASE INHIBITOR USE: ICD-10-CM

## 2024-11-01 PROCEDURE — G2211 COMPLEX E/M VISIT ADD ON: HCPCS

## 2024-11-01 PROCEDURE — 99214 OFFICE O/P EST MOD 30 MIN: CPT

## 2024-11-01 PROCEDURE — 99213 OFFICE O/P EST LOW 20 MIN: CPT

## 2024-11-01 ASSESSMENT — PAIN SCALES - GENERAL: PAINLEVEL_OUTOF10: NO PAIN (0)

## 2024-11-01 NOTE — LETTER
11/1/2024      Janeth Mathias  [address] [facility] [address]  [place] [address]      Dear Colleague,    Thank you for referring your patient, Janeth Mathias, to the [facility] [place]. Please see a copy of my visit note below.    [facility] Hematology and Oncology Outpatient Progress Note    Patient: Janeth aMthias  MRN: [id number]  Date of Service: Nov 1, 2024          Reason for Visit    Chief Complaint   Patient presents with     Oncology Clinic Visit     Malignant neoplasm of upper-inner quadrant of right breast in female, estrogen receptor positive (H)        Cancer Staging   Malignant neoplasm of upper-inner quadrant of right breast in female, estrogen receptor positive (H)  Staging form: Breast, AJCC 8th Edition  - Pathologic stage from 3/18/2020: Stage IB (pT3, pN1a, cM0, G2, ER+, IN+, HER2-, Oncotype DX score: 24) - Signed by Lisa Epps APRN CNP on 6/28/2022      Primary Hematologist/Oncologist: Georgia Barrera MD Wang, Xin, MD         Assessment/Plan  #.  Malignant neoplasm of upper-inner quadrant of right breast in female, estrogen receptor positive  #.  Aromatase inhibitor use  Clinically stable. No evidence for breast cancer recurrence on exam.  She has no new concerns. She endorses daily use of anastrozole, and tolerates this very well. Plan is to continue adjuvant endocrine therapy for at least 5 years.   Continue anastrozole. 5 years will be 12/2025.   Continue clinical surveillance with follow up clinical exam in 6 months.    Pt instructed to call this clinic sooner with any concerns.    #. Bone health  DEXA done 6/24/224 showed normal bone density.   Continue calcium/vitamin D and weightbearing exercises for bone health.    Recommend DEXA scan every 2-3 years, next due around 6/2026.    ______________________________________________________________________________    History of Present Illness/ Interval History    Ms. Janeth Mathias  is a 59 year old patient who is seen  today for a 6 month follow up visit. She has been taking anastrozole for breast cancer since 12/2020 and has excellent toleration. She does have joint aches in her hands, but attributes this to Rheumatoid arthritis and plans to get started on methotrexate next week with her rheumatologist in [place]. She endorses daily use of calcium and vitamin d. Stays active. Denies any new chest concerns.     ECOG performance status   0- Fully active, without restriction        Pain  Pain Score: No Pain (0)    ROS  A 14 point review of systems was obtained.  Positive findings noted in the history.  The remainder of the review of system is otherwise negative.      Oncology History/Treatment  Malignant neoplasm UIQ (R) female breast                       Pathologic Stage IB (pT3, pN1a(sn), cM0)                 G2              ER+ @ 95%              AL+ @ 75%                HER2-                 Oncotype DX score @ 24 2019, July - noted an inverted nipple on the right side.  2019, November - noted a lump in the right breast.    02/06/2020 diagnostic (B) mammogram and US showed a 5.4 cm mass within the right breast at the 12 o'clock position.    Core needle biopsy - c/w invasive ductal carcinoma, grade 2, strongly estrogen and progesterone receptor positive and HER-2 negative.    03/18/2020 - bilateral mastectomies and right-sided sentinel lymph node dissection with tissue expanders.    Pathology showed a 7.5 cm, grade 2, invasive ductal carcinoma, which also had some lymphovascular invasion.  1 of the 3 lymph nodes was positive for a 1.1 cm metastatic focus.  20% of the tumor was a solid type DCIS.    The posterior margin of the sample on the right side came back positive.  03/30/2020 - Reresection and Misa-G-Tbuzfqxktj for chemotherapy.  Consult with Dr Barrera - recommended adjuvant chemotherapy with dose dense Adriamycin + Cytoxan for 4 cycles followed by Taxol given once weekly for 12 weeks  followed by a radiation oncology  "consultation and adjuvant estrogen blockade therapy.  05/08 - 09/18/2020 - completed 4 cycles Adriamycin + Cytoxan followed by 12 weekly doses Taxol adjuvant chemotherapy.  12/03/2020 - completed 6040 cGy / 33 fx adjuvant EBRT.  12/04/2020 - started adjuvant aromatase inhibitor (AI) therapy with anastrozole, 1 mg/day.      Physical Exam    BP (!) 142/84   Pulse 90   Temp 98.3  F (36.8  C)   Resp 18   Ht 1.575 m (5' 2\")   Wt 86.3 kg (190 lb 3.2 oz)   SpO2 97%   BMI 34.79 kg/m      General: Well-appearing female in no acute distress. Cooperative in conversation.  Eyes: EOMI, PERRL. No scleral icterus.  ENT: Oral mucosa is moist without lesions or thrush. No ulcerations or mucositis noted.  Lymphatic: Neck is supple without cervical, axillary, or supraclavicular lymphadenopathy.   Cardiovascular: RRR No murmurs, gallops, or rubs. No peripheral edema.  Chest: surgically absent breasts. No suspicious masses, skin changes or axillary nodes.   Respiratory: CTA bilaterally. No wheezes or crackles.  Gastrointestinal: BS +. Abdomen soft, non-tender. No palpable hepatosplenomegaly or masses.   Neurologic: Alert and oriented x3. Cranial nerves II through XII are grossly intact.  Skin: No rashes, petechiae, or bruising noted. Warm, dry, intact.      Lab Results    No results found for this or any previous visit (from the past week).  I reviewed the above labs today.  Imaging    No results found.      Billing  Total time 25 minutes, to include face to face visit, review of EMR, ordering, documentation and coordination of care on date of service. The longitudinal plan of care for the diagnosis(es)/condition(s) as documented were addressed during this visit. Due to the added complexity in care, I will continue to support Janeth in the subsequent management and with ongoing continuity of care.    Signed by: RANDEE Melissa CNP        Chart documentation with Dragon Voice recognition Software. Although reviewed after " "completion, some words and grammatical errors may remain.    Oncology Rooming Note    November 1, 2024 9:17 AM   Janeth Mathias is a 59 year old female who presents for:    Chief Complaint   Patient presents with     Oncology Clinic Visit     Malignant neoplasm of upper-inner quadrant of right breast in female, estrogen receptor positive (H)     Initial Vitals: BP (!) 142/84   Pulse 90   Temp 98.3  F (36.8  C)   Resp 18   Ht 1.575 m (5' 2\")   Wt 86.3 kg (190 lb 3.2 oz)   SpO2 97%   BMI 34.79 kg/m   Estimated body mass index is 34.79 kg/m  as calculated from the following:    Height as of this encounter: 1.575 m (5' 2\").    Weight as of this encounter: 86.3 kg (190 lb 3.2 oz). Body surface area is 1.94 meters squared.  No Pain (0) Comment: Data Unavailable   No LMP recorded. Patient is perimenopausal.  Allergies reviewed: Yes  Medications reviewed: Yes    Medications: Medication refills not needed today.  Pharmacy name entered into [employer]:    [id number] - [place], [place] - [address]  AT [address]  [id number] [place] - [address] [place] AT [address]    Frailty Screening:   Is the patient here for a new oncology consult visit in cancer care? 2. No      Clinical concerns: None      Allie Carey LPN                 Again, thank you for allowing me to participate in the care of your patient.        Sincerely,        RANDEE Melissa CNP  "

## 2024-11-01 NOTE — PROGRESS NOTES
"Oncology Rooming Note    November 1, 2024 9:17 AM   Janeth Mathias is a 59 year old female who presents for:    Chief Complaint   Patient presents with    Oncology Clinic Visit     Malignant neoplasm of upper-inner quadrant of right breast in female, estrogen receptor positive (H)     Initial Vitals: BP (!) 142/84   Pulse 90   Temp 98.3  F (36.8  C)   Resp 18   Ht 1.575 m (5' 2\")   Wt 86.3 kg (190 lb 3.2 oz)   SpO2 97%   BMI 34.79 kg/m   Estimated body mass index is 34.79 kg/m  as calculated from the following:    Height as of this encounter: 1.575 m (5' 2\").    Weight as of this encounter: 86.3 kg (190 lb 3.2 oz). Body surface area is 1.94 meters squared.  No Pain (0) Comment: Data Unavailable   No LMP recorded. Patient is perimenopausal.  Allergies reviewed: Yes  Medications reviewed: Yes    Medications: Medication refills not needed today.  Pharmacy name entered into [facility]:    [id number] - [place] - [address]  AT [address]  [id number] - [place] - [address]. [place] AT [address]    Frailty Screening:   Is the patient here for a new oncology consult visit in cancer care? 2. No      Clinical concerns: None      Allie Carey LPN             "

## 2025-06-27 ENCOUNTER — ONCOLOGY VISIT (OUTPATIENT)
Dept: ONCOLOGY | Facility: HOSPITAL | Age: 60
End: 2025-06-27
Attending: INTERNAL MEDICINE
Payer: COMMERCIAL

## 2025-06-27 VITALS
RESPIRATION RATE: 16 BRPM | BODY MASS INDEX: 32.02 KG/M2 | OXYGEN SATURATION: 98 % | SYSTOLIC BLOOD PRESSURE: 121 MMHG | HEART RATE: 98 BPM | HEIGHT: 62 IN | DIASTOLIC BLOOD PRESSURE: 87 MMHG | WEIGHT: 174 LBS | TEMPERATURE: 98.9 F

## 2025-06-27 DIAGNOSIS — Z79.811 AROMATASE INHIBITOR USE: ICD-10-CM

## 2025-06-27 DIAGNOSIS — T45.1X5A CHEMOTHERAPY-INDUCED PERIPHERAL NEUROPATHY: ICD-10-CM

## 2025-06-27 DIAGNOSIS — G62.0 CHEMOTHERAPY-INDUCED PERIPHERAL NEUROPATHY: ICD-10-CM

## 2025-06-27 DIAGNOSIS — Z17.0 MALIGNANT NEOPLASM OF UPPER-INNER QUADRANT OF RIGHT BREAST IN FEMALE, ESTROGEN RECEPTOR POSITIVE (H): Primary | ICD-10-CM

## 2025-06-27 DIAGNOSIS — C50.211 MALIGNANT NEOPLASM OF UPPER-INNER QUADRANT OF RIGHT BREAST IN FEMALE, ESTROGEN RECEPTOR POSITIVE (H): Primary | ICD-10-CM

## 2025-06-27 PROCEDURE — 99213 OFFICE O/P EST LOW 20 MIN: CPT

## 2025-06-27 PROCEDURE — 99214 OFFICE O/P EST MOD 30 MIN: CPT

## 2025-06-27 PROCEDURE — G2211 COMPLEX E/M VISIT ADD ON: HCPCS

## 2025-06-27 RX ORDER — PRAVASTATIN SODIUM 20 MG
1 TABLET ORAL DAILY
COMMUNITY
Start: 2025-05-28

## 2025-06-27 RX ORDER — FOLIC ACID 1 MG/1
1 TABLET ORAL DAILY
COMMUNITY
Start: 2025-04-25

## 2025-06-27 RX ORDER — METHOTREXATE 2.5 MG/1
20 TABLET ORAL
COMMUNITY
Start: 2025-04-25

## 2025-06-27 RX ORDER — EZETIMIBE 10 MG/1
1 TABLET ORAL DAILY
COMMUNITY
Start: 2025-04-19

## 2025-06-27 ASSESSMENT — PAIN SCALES - GENERAL: PAINLEVEL_OUTOF10: NO PAIN (0)

## 2025-06-27 NOTE — PROGRESS NOTES
[facility] Hematology and Oncology Outpatient Progress Note    Patient: Janeth Mathias  MRN: [id number]  Date of Service: Jun 27, 2025          Reason for Visit    Chief Complaint   Patient presents with    Oncology Clinic Visit     Malignant neoplasm of upper-inner quadrant of right breast in female, estrogen receptor positive         Cancer Staging   Malignant neoplasm of upper-inner quadrant of right breast in female, estrogen receptor positive (H)  Staging form: Breast, AJCC 8th Edition  - Pathologic stage from 3/18/2020: Stage IB (pT3, pN1a, cM0, G2, ER+, SC+, HER2-, Oncotype DX score: 24) - Signed by Lisa Epps APRN CNP on 6/28/2022      Assessment/Plan  #.  Malignant neoplasm of upper-inner quadrant of right breast in female, estrogen receptor positive  #.  Aromatase inhibitor use  #.  Rheumatoid Arthritis  Clinically stable. No evidence for breast cancer recurrence on exam.  She has no new concerns. She endorses daily use of anastrozole, and tolerates this very well. Plan is to continue adjuvant endocrine therapy for at least 5 years.   Continue anastrozole. 5 years will be 12/2025.   Continue clinical surveillance with follow up clinical exam in 6 months.    Pt instructed to call this clinic sooner with any concerns.    #. Bone health  DEXA done 6/24/24 showed normal bone density.   Continue calcium/vitamin D and weightbearing exercises for bone health.    Recommend DEXA scan every 2-3 years, next due around 6/2026.    ______________________________________________________________________________    History of Present Illness/ Interval History    Ms. Janeth Mathias  is a 59 year old patient who is seen today for a 6 month follow up visit. She is doing well. She notes that she will be having skin tightening procedure on 8/28 in [place]. She has some minor tingling in fingertips.     She has been taking anastrozole for breast cancer since 12/2020 and has excellent toleration. She does have joint  aches in her hands, but attributes this to Rheumatoid arthritis is now on methotrexate per her rheumatologist in [place]. She endorses daily use of calcium and vitamin d. Stays active. Denies any new chest concerns.     ECOG performance status   0- Fully active, without restriction        Pain  Pain Score: No Pain (0)    ROS  A 14 point review of systems was obtained.  Positive findings noted in the history.  The remainder of the review of system is otherwise negative.      Oncology History/Treatment  Malignant neoplasm UIQ (R) female breast                       Pathologic Stage IB (pT3, pN1a(sn), cM0)                 G2              ER+ @ 95%              CA+ @ 75%                HER2-                 Oncotype DX score @ 24  2019, July - noted an inverted nipple on the right side.  2019, November - noted a lump in the right breast.    02/06/2020 diagnostic (B) mammogram and US showed a 5.4 cm mass within the right breast at the 12 o'clock position.    Core needle biopsy - c/w invasive ductal carcinoma, grade 2, strongly estrogen and progesterone receptor positive and HER-2 negative.    03/18/2020 - bilateral mastectomies and right-sided sentinel lymph node dissection with tissue expanders.    Pathology showed a 7.5 cm, grade 2, invasive ductal carcinoma, which also had some lymphovascular invasion.  1 of the 3 lymph nodes was positive for a 1.1 cm metastatic focus.  20% of the tumor was a solid type DCIS.    The posterior margin of the sample on the right side came back positive.  03/30/2020 - Reresection and Sigv-Q-Szrfghtxkp for chemotherapy.  Consult with Dr Barrera - recommended adjuvant chemotherapy with dose dense Adriamycin + Cytoxan for 4 cycles followed by Taxol given once weekly for 12 weeks  followed by a radiation oncology consultation and adjuvant estrogen blockade therapy.  05/08 - 09/18/2020 - completed 4 cycles Adriamycin + Cytoxan followed by 12 weekly doses Taxol adjuvant chemotherapy.  12/03/2020  "- completed 6040 cGy / 33 fx adjuvant EBRT.  12/04/2020 - started adjuvant aromatase inhibitor (AI) therapy with anastrozole, 1 mg/day.      Physical Exam    /87 (BP Location: Right arm, Patient Position: Sitting, Cuff Size: Adult Regular)   Pulse 98   Temp 98.9  F (37.2  C) (Temporal)   Resp 16   Ht 1.575 m (5' 2\")   Wt 78.9 kg (174 lb)   SpO2 98%   BMI 31.83 kg/m      General: Well-appearing female in no acute distress. Cooperative in conversation.  Eyes: EOMI, PERRL. No scleral icterus.  ENT: Oral mucosa is moist without lesions or thrush. No ulcerations or mucositis noted.  Lymphatic: Neck is supple without cervical, axillary, or supraclavicular lymphadenopathy.   Cardiovascular: RRR No murmurs, gallops, or rubs. No peripheral edema.  Chest: surgically absent breasts. No suspicious masses, skin changes or axillary nodes.   Respiratory: CTA bilaterally. No wheezes or crackles.  Gastrointestinal: BS +. Abdomen soft, non-tender. No palpable hepatosplenomegaly or masses.   Neurologic: Alert and oriented x3. Cranial nerves II through XII are grossly intact.  Skin: No rashes, petechiae, or bruising noted. Warm, dry, intact.      Lab Results    No results found for this or any previous visit (from the past week).  I reviewed the above labs today.  Imaging    No results found.      Billing  Total time 20 minutes, to include face to face visit, review of EMR, ordering, documentation and coordination of care on date of service. The longitudinal plan of care for the diagnosis(es)/condition(s) as documented were addressed during this visit. Due to the added complexity in care, I will continue to support Janeth in the subsequent management and with ongoing continuity of care.    Signed by: RANDEE Melissa CNP        Chart documentation with Dragon Voice recognition Software. Although reviewed after completion, some words and grammatical errors may remain.  "

## 2025-06-27 NOTE — LETTER
6/27/2025      Janeth Mathias  [address]  [place] [address]      Dear Colleague,    Thank you for referring your patient, Janeth Mathias, to the [facility] [place]. Please see a copy of my visit note below.    [facility] Hematology and Oncology Outpatient Progress Note    Patient: Janeth Mathias  MRN: [id number]  Date of Service: Jun 27, 2025          Reason for Visit    Chief Complaint   Patient presents with     Oncology Clinic Visit     Malignant neoplasm of upper-inner quadrant of right breast in female, estrogen receptor positive         Cancer Staging   Malignant neoplasm of upper-inner quadrant of right breast in female, estrogen receptor positive (H)  Staging form: Breast, AJCC 8th Edition  - Pathologic stage from 3/18/2020: Stage IB (pT3, pN1a, cM0, G2, ER+, HI+, HER2-, Oncotype DX score: 24) - Signed by Lisa Epps APRN CNP on 6/28/2022      Assessment/Plan  #.  Malignant neoplasm of upper-inner quadrant of right breast in female, estrogen receptor positive  #.  Aromatase inhibitor use  #.  Rheumatoid Arthritis  Clinically stable. No evidence for breast cancer recurrence on exam.  She has no new concerns. She endorses daily use of anastrozole, and tolerates this very well. Plan is to continue adjuvant endocrine therapy for at least 5 years.   Continue anastrozole. 5 years will be 12/2025.   Continue clinical surveillance with follow up clinical exam in 6 months.    Pt instructed to call this clinic sooner with any concerns.    #. Bone health  DEXA done 6/24/24 showed normal bone density.   Continue calcium/vitamin D and weightbearing exercises for bone health.    Recommend DEXA scan every 2-3 years, next due around 6/2026.    ______________________________________________________________________________    History of Present Illness/ Interval History    Ms. Janeth Mathias  is a 59 year old patient who is seen today for a 6 month follow up visit. She is doing  well. She notes that she will be having skin tightening procedure on 8/28 in [place]. She has some minor tingling in fingertips.     She has been taking anastrozole for breast cancer since 12/2020 and has excellent toleration. She does have joint aches in her hands, but attributes this to Rheumatoid arthritis is now on methotrexate per her rheumatologist in [place]. She endorses daily use of calcium and vitamin d. Stays active. Denies any new chest concerns.     ECOG performance status   0- Fully active, without restriction        Pain  Pain Score: No Pain (0)    ROS  A 14 point review of systems was obtained.  Positive findings noted in the history.  The remainder of the review of system is otherwise negative.      Oncology History/Treatment  Malignant neoplasm UIQ (R) female breast                       Pathologic Stage IB (pT3, pN1a(sn), cM0)                 G2              ER+ @ 95%              TN+ @ 75%                HER2-                 Oncotype DX score @ 24  2019, July - noted an inverted nipple on the right side.  2019, November - noted a lump in the right breast.    02/06/2020 diagnostic (B) mammogram and US showed a 5.4 cm mass within the right breast at the 12 o'clock position.    Core needle biopsy - c/w invasive ductal carcinoma, grade 2, strongly estrogen and progesterone receptor positive and HER-2 negative.    03/18/2020 - bilateral mastectomies and right-sided sentinel lymph node dissection with tissue expanders.    Pathology showed a 7.5 cm, grade 2, invasive ductal carcinoma, which also had some lymphovascular invasion.  1 of the 3 lymph nodes was positive for a 1.1 cm metastatic focus.  20% of the tumor was a solid type DCIS.    The posterior margin of the sample on the right side came back positive.  03/30/2020 - Reresection and Negb-B-Swehpiqrow for chemotherapy.  Consult with Dr Barrera - recommended adjuvant chemotherapy with dose dense Adriamycin + Cytoxan for 4 cycles followed by Taxol  "given once weekly for 12 weeks  followed by a radiation oncology consultation and adjuvant estrogen blockade therapy.  05/08 - 09/18/2020 - completed 4 cycles Adriamycin + Cytoxan followed by 12 weekly doses Taxol adjuvant chemotherapy.  12/03/2020 - completed 6040 cGy / 33 fx adjuvant EBRT.  12/04/2020 - started adjuvant aromatase inhibitor (AI) therapy with anastrozole, 1 mg/day.      Physical Exam    /87 (BP Location: Right arm, Patient Position: Sitting, Cuff Size: Adult Regular)   Pulse 98   Temp 98.9  F (37.2  C) (Temporal)   Resp 16   Ht 1.575 m (5' 2\")   Wt 78.9 kg (174 lb)   SpO2 98%   BMI 31.83 kg/m      General: Well-appearing female in no acute distress. Cooperative in conversation.  Eyes: EOMI, PERRL. No scleral icterus.  ENT: Oral mucosa is moist without lesions or thrush. No ulcerations or mucositis noted.  Lymphatic: Neck is supple without cervical, axillary, or supraclavicular lymphadenopathy.   Cardiovascular: RRR No murmurs, gallops, or rubs. No peripheral edema.  Chest: surgically absent breasts. No suspicious masses, skin changes or axillary nodes.   Respiratory: CTA bilaterally. No wheezes or crackles.  Gastrointestinal: BS +. Abdomen soft, non-tender. No palpable hepatosplenomegaly or masses.   Neurologic: Alert and oriented x3. Cranial nerves II through XII are grossly intact.  Skin: No rashes, petechiae, or bruising noted. Warm, dry, intact.      Lab Results    No results found for this or any previous visit (from the past week).  I reviewed the above labs today.  Imaging    No results found.      Billing  Total time 20 minutes, to include face to face visit, review of EMR, ordering, documentation and coordination of care on date of service. The longitudinal plan of care for the diagnosis(es)/condition(s) as documented were addressed during this visit. Due to the added complexity in care, I will continue to support Janeth in the subsequent management and with ongoing continuity " "of care.    Signed by: RANDEE Melissa CNP        Chart documentation with Dragon Voice recognition Software. Although reviewed after completion, some words and grammatical errors may remain.    Oncology Rooming Note    June 27, 2025 1:24 PM   Janeth Mathias is a 59 year old female who presents for:    Chief Complaint   Patient presents with     Oncology Clinic Visit     Malignant neoplasm of upper-inner quadrant of right breast in female, estrogen receptor positive      Initial Vitals: /87 (BP Location: Right arm, Patient Position: Sitting, Cuff Size: Adult Regular)   Pulse 98   Temp 98.9  F (37.2  C) (Temporal)   Resp 16   Ht 1.575 m (5' 2\")   Wt 78.9 kg (174 lb)   SpO2 98%   BMI 31.83 kg/m   Estimated body mass index is 31.83 kg/m  as calculated from the following:    Height as of this encounter: 1.575 m (5' 2\").    Weight as of this encounter: 78.9 kg (174 lb). Body surface area is 1.86 meters squared.  No Pain (0) Comment: Data Unavailable   No LMP recorded. Patient is perimenopausal.  Allergies reviewed: Yes  Medications reviewed: Yes    Medications: Medication refills not needed today.  Pharmacy name entered into [employer]:    [id number]  [place], [place] - [address]  AT [address]/[id number] - [place] - [address] AT [address]    Frailty Screening:   Is the patient here for a new oncology consult visit in cancer care? 2. No    PHQ9:  Did this patient require a PHQ9?: No      Clinical concerns:  6 month follow up      Maty Mcfarland              Again, thank you for allowing me to participate in the care of your patient.        Sincerely,        RANDEE Melissa CNP    Electronically signed"

## 2025-06-27 NOTE — PROGRESS NOTES
"Oncology Rooming Note    June 27, 2025 1:24 PM   Janeth Mathias is a 59 year old female who presents for:    Chief Complaint   Patient presents with    Oncology Clinic Visit     Malignant neoplasm of upper-inner quadrant of right breast in female, estrogen receptor positive      Initial Vitals: /87 (BP Location: Right arm, Patient Position: Sitting, Cuff Size: Adult Regular)   Pulse 98   Temp 98.9  F (37.2  C) (Temporal)   Resp 16   Ht 1.575 m (5' 2\")   Wt 78.9 kg (174 lb)   SpO2 98%   BMI 31.83 kg/m   Estimated body mass index is 31.83 kg/m  as calculated from the following:    Height as of this encounter: 1.575 m (5' 2\").    Weight as of this encounter: 78.9 kg (174 lb). Body surface area is 1.86 meters squared.  No Pain (0) Comment: Data Unavailable   No LMP recorded. Patient is perimenopausal.  Allergies reviewed: Yes  Medications reviewed: Yes    Medications: Medication refills not needed today.  Pharmacy name entered into [facility]:    [facility][id number] - [place], [place] - [address]  AT [address]  [id number] - [place] - [address]. [place] AT [address]    Frailty Screening:   Is the patient here for a new oncology consult visit in cancer care? 2. No    PHQ9:  Did this patient require a PHQ9?: No      Clinical concerns:  6 month follow up      Maty Mcfarland            "